# Patient Record
Sex: MALE | Race: WHITE | Employment: OTHER | URBAN - METROPOLITAN AREA
[De-identification: names, ages, dates, MRNs, and addresses within clinical notes are randomized per-mention and may not be internally consistent; named-entity substitution may affect disease eponyms.]

---

## 2017-01-04 PROCEDURE — 80053 COMPREHEN METABOLIC PANEL: CPT | Performed by: INTERNAL MEDICINE

## 2017-01-04 PROCEDURE — 36415 COLL VENOUS BLD VENIPUNCTURE: CPT | Performed by: INTERNAL MEDICINE

## 2017-01-04 PROCEDURE — 82570 ASSAY OF URINE CREATININE: CPT | Performed by: INTERNAL MEDICINE

## 2017-01-04 PROCEDURE — 84443 ASSAY THYROID STIM HORMONE: CPT | Performed by: INTERNAL MEDICINE

## 2017-01-04 PROCEDURE — 82043 UR ALBUMIN QUANTITATIVE: CPT | Performed by: INTERNAL MEDICINE

## 2017-01-04 PROCEDURE — 83721 ASSAY OF BLOOD LIPOPROTEIN: CPT | Performed by: INTERNAL MEDICINE

## 2017-01-04 PROCEDURE — 80061 LIPID PANEL: CPT | Performed by: INTERNAL MEDICINE

## 2017-03-15 ENCOUNTER — APPOINTMENT (OUTPATIENT)
Dept: CT IMAGING | Facility: HOSPITAL | Age: 57
DRG: 313 | End: 2017-03-15
Attending: EMERGENCY MEDICINE
Payer: COMMERCIAL

## 2017-03-15 ENCOUNTER — APPOINTMENT (OUTPATIENT)
Dept: GENERAL RADIOLOGY | Facility: HOSPITAL | Age: 57
DRG: 313 | End: 2017-03-15
Attending: EMERGENCY MEDICINE
Payer: COMMERCIAL

## 2017-03-15 ENCOUNTER — HOSPITAL ENCOUNTER (INPATIENT)
Facility: HOSPITAL | Age: 57
LOS: 2 days | Discharge: HOME | DRG: 313 | End: 2017-03-17
Attending: EMERGENCY MEDICINE | Admitting: INTERNAL MEDICINE
Payer: COMMERCIAL

## 2017-03-15 DIAGNOSIS — E11.65 TYPE 2 DIABETES, UNCONTROLLED, WITH NEUROPATHY (HCC): Primary | ICD-10-CM

## 2017-03-15 DIAGNOSIS — K62.5 RECTAL BLEEDING: ICD-10-CM

## 2017-03-15 DIAGNOSIS — E11.40 TYPE 2 DIABETES, UNCONTROLLED, WITH NEUROPATHY (HCC): Primary | ICD-10-CM

## 2017-03-15 DIAGNOSIS — N17.9 AKI (ACUTE KIDNEY INJURY) (HCC): ICD-10-CM

## 2017-03-15 DIAGNOSIS — M54.12 CERVICAL RADICULOPATHY: ICD-10-CM

## 2017-03-15 DIAGNOSIS — R20.2 PARESTHESIA: ICD-10-CM

## 2017-03-15 DIAGNOSIS — R07.9 ACUTE CHEST PAIN: Primary | ICD-10-CM

## 2017-03-15 PROBLEM — R73.9 HYPERGLYCEMIA: Status: ACTIVE | Noted: 2017-03-15

## 2017-03-15 PROBLEM — R79.89 AZOTEMIA: Status: ACTIVE | Noted: 2017-03-15

## 2017-03-15 LAB
ALBUMIN SERPL-MCNC: 3.2 G/DL (ref 3.5–4.8)
ALP LIVER SERPL-CCNC: 47 U/L (ref 45–117)
ALT SERPL-CCNC: 37 U/L (ref 17–63)
ANTIBODY SCREEN: NEGATIVE
APTT PPP: 28.1 SECONDS (ref 25–34)
AST SERPL-CCNC: 28 U/L (ref 15–41)
ATRIAL RATE: 74 BPM
BASOPHILS # BLD AUTO: 0.03 X10(3) UL (ref 0–0.1)
BASOPHILS NFR BLD AUTO: 0.4 %
BETA NATRIURETIC PEPTIDE: 12 PG/ML (ref 2–99)
BILIRUB SERPL-MCNC: 0.3 MG/DL (ref 0.1–2)
BILIRUB UR QL STRIP.AUTO: NEGATIVE
BUN BLD-MCNC: 52 MG/DL (ref 8–20)
CALCIUM BLD-MCNC: 9 MG/DL (ref 8.3–10.3)
CHLORIDE: 104 MMOL/L (ref 101–111)
CLARITY UR REFRACT.AUTO: CLEAR
CO2: 24 MMOL/L (ref 22–32)
CREAT BLD-MCNC: 2.24 MG/DL (ref 0.7–1.3)
D-DIMER: <0.27 UG/ML FEU (ref 0–0.49)
EOSINOPHIL # BLD AUTO: 0.23 X10(3) UL (ref 0–0.3)
EOSINOPHIL NFR BLD AUTO: 2.9 %
ERYTHROCYTE [DISTWIDTH] IN BLOOD BY AUTOMATED COUNT: 14.9 % (ref 11.5–16)
EST. AVERAGE GLUCOSE BLD GHB EST-MCNC: 192 MG/DL (ref 68–126)
GLUCOSE BLD-MCNC: 143 MG/DL (ref 65–99)
GLUCOSE BLD-MCNC: 149 MG/DL (ref 65–99)
GLUCOSE BLD-MCNC: 199 MG/DL (ref 70–99)
GLUCOSE UR STRIP.AUTO-MCNC: 50 MG/DL
HBA1C MFR BLD HPLC: 8.3 % (ref ?–5.7)
HCT VFR BLD AUTO: 37.7 % (ref 37–53)
HGB BLD-MCNC: 12.6 G/DL (ref 13–17)
IMMATURE GRANULOCYTE COUNT: 0.04 X10(3) UL (ref 0–1)
IMMATURE GRANULOCYTE RATIO %: 0.5 %
INR BLD: 0.95 (ref 0.89–1.11)
KETONES UR STRIP.AUTO-MCNC: NEGATIVE MG/DL
LEUKOCYTE ESTERASE UR QL STRIP.AUTO: NEGATIVE
LYMPHOCYTES # BLD AUTO: 3.22 X10(3) UL (ref 0.9–4)
LYMPHOCYTES NFR BLD AUTO: 40.6 %
M PROTEIN MFR SERPL ELPH: 7.3 G/DL (ref 6.1–8.3)
MCH RBC QN AUTO: 27.2 PG (ref 27–33.2)
MCHC RBC AUTO-ENTMCNC: 33.4 G/DL (ref 31–37)
MCV RBC AUTO: 81.3 FL (ref 80–99)
MONOCYTES # BLD AUTO: 0.58 X10(3) UL (ref 0.1–0.6)
MONOCYTES NFR BLD AUTO: 7.3 %
NEUTROPHIL ABS PRELIM: 3.84 X10 (3) UL (ref 1.3–6.7)
NEUTROPHILS # BLD AUTO: 3.84 X10(3) UL (ref 1.3–6.7)
NEUTROPHILS NFR BLD AUTO: 48.3 %
NITRITE UR QL STRIP.AUTO: NEGATIVE
P AXIS: 12 DEGREES
P-R INTERVAL: 186 MS
PH UR STRIP.AUTO: 5 [PH] (ref 4.5–8)
PLATELET # BLD AUTO: 217 10(3)UL (ref 150–450)
POTASSIUM SERPL-SCNC: 4.1 MMOL/L (ref 3.6–5.1)
PROT UR STRIP.AUTO-MCNC: NEGATIVE MG/DL
PSA SERPL DL<=0.01 NG/ML-MCNC: 12.7 SECONDS (ref 12–14.3)
Q-T INTERVAL: 400 MS
QRS DURATION: 108 MS
QTC CALCULATION (BEZET): 444 MS
R AXIS: 17 DEGREES
RBC # BLD AUTO: 4.64 X10(6)UL (ref 4.3–5.7)
RBC UR QL AUTO: NEGATIVE
RED CELL DISTRIBUTION WIDTH-SD: 44 FL (ref 35.1–46.3)
RH BLOOD TYPE: POSITIVE
SODIUM SERPL-SCNC: 139 MMOL/L (ref 136–144)
SP GR UR STRIP.AUTO: 1.01 (ref 1–1.03)
T AXIS: 58 DEGREES
TROPONIN: <0.046 NG/ML (ref ?–0.05)
TROPONIN: <0.046 NG/ML (ref ?–0.05)
UROBILINOGEN UR STRIP.AUTO-MCNC: <2 MG/DL
VENTRICULAR RATE: 74 BPM
WBC # BLD AUTO: 7.9 X10(3) UL (ref 4–13)

## 2017-03-15 PROCEDURE — 71010 XR CHEST AP PORTABLE  (CPT=71010): CPT

## 2017-03-15 PROCEDURE — 99223 1ST HOSP IP/OBS HIGH 75: CPT | Performed by: INTERNAL MEDICINE

## 2017-03-15 PROCEDURE — 74176 CT ABD & PELVIS W/O CONTRAST: CPT

## 2017-03-15 RX ORDER — FENOFIBRATE 134 MG/1
134 CAPSULE ORAL DAILY
Status: DISCONTINUED | OUTPATIENT
Start: 2017-03-16 | End: 2017-03-17

## 2017-03-15 RX ORDER — ONDANSETRON 2 MG/ML
4 INJECTION INTRAMUSCULAR; INTRAVENOUS EVERY 4 HOURS PRN
Status: DISCONTINUED | OUTPATIENT
Start: 2017-03-15 | End: 2017-03-17

## 2017-03-15 RX ORDER — KETOCONAZOLE 20 MG/G
CREAM TOPICAL
Status: DISCONTINUED | OUTPATIENT
Start: 2017-03-15 | End: 2017-03-16

## 2017-03-15 RX ORDER — DEXTROSE MONOHYDRATE 25 G/50ML
50 INJECTION, SOLUTION INTRAVENOUS
Status: DISCONTINUED | OUTPATIENT
Start: 2017-03-15 | End: 2017-03-17

## 2017-03-15 RX ORDER — ATORVASTATIN CALCIUM 80 MG/1
80 TABLET, FILM COATED ORAL NIGHTLY
Status: DISCONTINUED | OUTPATIENT
Start: 2017-03-15 | End: 2017-03-17

## 2017-03-15 RX ORDER — MELATONIN
325 EVERY OTHER DAY
Status: DISCONTINUED | OUTPATIENT
Start: 2017-03-16 | End: 2017-03-17

## 2017-03-15 RX ORDER — DOCUSATE SODIUM 100 MG/1
100 CAPSULE, LIQUID FILLED ORAL 2 TIMES DAILY
Status: DISCONTINUED | OUTPATIENT
Start: 2017-03-15 | End: 2017-03-17

## 2017-03-15 RX ORDER — SODIUM CHLORIDE 9 MG/ML
1000 INJECTION, SOLUTION INTRAVENOUS ONCE
Status: COMPLETED | OUTPATIENT
Start: 2017-03-15 | End: 2017-03-15

## 2017-03-15 RX ORDER — FLUTICASONE PROPIONATE 50 MCG
2 SPRAY, SUSPENSION (ML) NASAL DAILY
Status: DISCONTINUED | OUTPATIENT
Start: 2017-03-15 | End: 2017-03-17

## 2017-03-15 RX ORDER — SODIUM CHLORIDE 9 MG/ML
INJECTION, SOLUTION INTRAVENOUS CONTINUOUS
Status: ACTIVE | OUTPATIENT
Start: 2017-03-15 | End: 2017-03-15

## 2017-03-15 RX ORDER — GABAPENTIN 300 MG/1
300 CAPSULE ORAL 3 TIMES DAILY
Status: DISCONTINUED | OUTPATIENT
Start: 2017-03-15 | End: 2017-03-17

## 2017-03-15 RX ORDER — ASPIRIN 81 MG/1
324 TABLET, CHEWABLE ORAL ONCE
Status: COMPLETED | OUTPATIENT
Start: 2017-03-15 | End: 2017-03-15

## 2017-03-15 RX ORDER — AMITRIPTYLINE HYDROCHLORIDE 25 MG/1
25 TABLET, FILM COATED ORAL NIGHTLY
Status: DISCONTINUED | OUTPATIENT
Start: 2017-03-15 | End: 2017-03-17

## 2017-03-15 RX ORDER — METOPROLOL SUCCINATE 50 MG/1
50 TABLET, EXTENDED RELEASE ORAL DAILY
Status: DISCONTINUED | OUTPATIENT
Start: 2017-03-16 | End: 2017-03-17

## 2017-03-15 RX ORDER — DOXEPIN HYDROCHLORIDE 50 MG/1
1 CAPSULE ORAL DAILY
Status: DISCONTINUED | OUTPATIENT
Start: 2017-03-16 | End: 2017-03-17

## 2017-03-15 RX ORDER — ASPIRIN 81 MG/1
81 TABLET ORAL NIGHTLY
Status: DISCONTINUED | OUTPATIENT
Start: 2017-03-15 | End: 2017-03-17

## 2017-03-15 NOTE — PLAN OF CARE
NURSING ADMISSION NOTE      Patient admitted via Cart  Oriented to room. Safety precautions initiated. Bed in low position. Call light in reach.   Admission complete    Report given to RN

## 2017-03-15 NOTE — ED PROVIDER NOTES
Patient Seen in: [facility] Emergency Department    History   Patient presents with:  Chest Pain Angina (cardiovascular)    Stated Complaint: CP    HPI    Patient is a 59-year-old male with a history of four-vessel bypass done proximally 4 years ago localized, unspecified site      bilateral hips, bone spurs left hip   • Visual impairment    • ATRIAL FIBRILLATION    • Obesity, unspecified    • Personal history of other musculoskeletal disorders(V13.59) 1970's     fractured both elbows   • Personal his tablet daily for one week, then daily as needed   Amitriptyline HCl 25 MG Oral Tab,  TAKE 1 TABLET BY MOUTH NIGHTLY   METOPROLOL SUCCINATE ER 50 MG Oral Tablet 24 Hr,  TAKE 1 TABLET BY MOUTH EVERY DAY   insulin aspart (NOVOLOG FLEXPEN) 100 UNIT/ML Subcutan CAD   • Diabetes Sister      Gestational DM   • Diabetes Daughter      Type 2 DM on metformin   • Other[other] [OTHER] Daughter    • Hypertension Son    • Other[other] [OTHER] Son    • Obesity Son      Josefa Dubin over 300 lbs   • Thyroid Disorder Neg tenderness to palpation appreciated  throughout the abdomen. There is no guarding, no rebound, no mass, and no organomegaly appreciated. There is normoactive bowel sounds. There is no hernia.   RECTAL: There is brown stool which is Hemoccult negative with n Abnormality         Status                     ---------                               -----------         ------                     BLOOD TYPE, ABO AND RH D[[id number]]                         Final result               ANTIBODY SCREEN[1 Prescribed:  Current Discharge Medication List        Present on Admission  Date Reviewed: 12/30/2016          ICD-10-CM Noted POA    Acute chest pain R07.9 3/15/2017 Unknown    Acute kidney injury ([facility] [address] N17.9 3/15/2017 Yes    Azotemia R79.89 3/15/2017 Yes

## 2017-03-15 NOTE — ED INITIAL ASSESSMENT (HPI)
PT WITH CO CP STARTING LAST NIGHT. PT NOTED BLOOD IN HIS STOOL AROUND 2230 AND THEN WENT BACK TO BED. PT AWOKE IN A SWEAT.

## 2017-03-15 NOTE — H&P
DMg hospitalist H+P    PCP; Abundio Carrillo MD  CC pain      HPI 63 yo male with multiple medical problems including HTN, HL, DM2, RHETT, A-fib, depression came for evaluation of chest discomfort that occurred last night.  He was constipated, went to have BM a disease ([facility] [address] 3/9/2016     cerebral    • Depressed    • Arrhythmia    • Endocrine disorder    • Genitourinary disease    • Heart attack [facility])    • Depression    • Psychiatric disorder          Past Surgical History    LAPARO RADICAL NEPHRECTOMY Right 08/30 Name: N/A    Years of Education: N/A  Number of Children: 2     Occupational History    [employer]     Social History Main Topics   Smoking status: Former Smoker  0.50 Packs/Day  For 10.00 Years     Types: Cigarettes    Quit date: 01/01/1987    Smok Take 2 g by mouth 2 (two) times daily. Disp:  Rfl:    ferrous sulfate 325 (65 FE) MG Oral Tab EC Take 325 mg by mouth every other day. Disp:  Rfl:    Multiple Vitamin (TAB-A-CRISTO) Oral Tab Take 1 tablet by mouth daily.  Disp:  Rfl:    OMEGA-3-ACID ETHYL E depression    Chest pain; patient with hx of CAD, CABG  -cardiology consulted  Monitor on telemetry  Serial troponin  He has chest pain with deep breathing, check D-dimer, if elevated would need VQ scan  -cardiology consulted    DM2; insulin      DEEPA on CK

## 2017-03-15 NOTE — TELEPHONE ENCOUNTER
Medication: gabapentin    Date of last refill: 10/12/16  [place]    Last office visit: 10/12/16  Due back to clinic per last office note:  3 months  Date next office visit scheduled:  Future Appointments  Date Time Pr improvement in neuropathy symptoms with gabapentin at 300 mg bid but still persistent; dizziness explained to patient and VNG testing may suggest \"central\" etiology but no structural etiology based on imaging and would recommend continue vestibular thera

## 2017-03-15 NOTE — CONSULTS
[facility]  Report of Consultation    Arcelia Vaughn Patient Status:  Inpatient    1960 MRN [id number]   [facility] 0SW-A Attending Sheryle Bjork, MD   Hosp Day # 0 PCP Hung Enriquez MD     Reason for Consultation:  DEEPA/CKD    His D deficiency 1/7/2015     Dx in 1/2015   • Renal cancer ([facility] [address] 12/27/2012     Right nephrectomy in 2010    • S/p nephrectomy 12/27/2012     Right nephrectomy in 2010 for renal cancer    • Near syncope 9/20/2015   • Type 2 diabetes mellitus with vascular dis Diabetes Daughter      Type 2 DM on metformin   • Other[other] [OTHER] Daughter    • Hypertension Son    • Other[other] [OTHER] Son    • Obesity Son      Tiago Saavedra over 300 lbs   • Thyroid Disorder Neg       reports that he quit smoking about 30 years ago.  [email or web address]  •  [START ON 3/16/2017] Metoprolol Succinate ER (Toprol XL) 24 hr tab 50 mg, 50 mg, Oral, Daily  •  [START ON 3/16/2017] multivitamin (ADULT) tab 1 tablet, 1 tablet, Oral, Daily  Home Medications:    No current outpatient prescriptions on fi Negative 03/15/2017   UROBILINOGEN <2.0 03/15/2017   NITRITE Negative 03/15/2017   LEUUR Negative 03/15/2017         BUN (mg/dL)   Date Value   03/15/2017 52*   01/04/2017 42*   09/26/2016 33*   08/05/2014 37*   08/04/2014 40*   08/04/2014 42*   ----------

## 2017-03-15 NOTE — CONSULTS
[facility] Cardiology Consultation    Romina Benita Patient Status:  Inpatient    1960 MRN [id number]   [facility] 0SW-A Attending Yomi Mackay MD   Hosp Day # 0 PCP Donita Blizzard, MD     Reason for Consultation:  Chest pain, dyspnea      H • Obesity, unspecified    • Personal history of other musculoskeletal disorders(V13.59) 1970's     fractured both elbows   • Personal history of other musculoskeletal disorders(V13.59) 1969     rt foot   • Vitamin D deficiency 1/7/2015     Dx in 1/2015 Father    • Cancer Mother      Skin   • Diabetes Mother      Type 2 DM   • Diabetes Maternal Grandfather      Type 2 DM   • Heart Disorder Maternal Grandfather      CAD   • Diabetes Sister      Gestational DM   • Diabetes Daughter      Type 2 DM on metform 03/15/17   0656   WBC  7.9   HGB  12.6*   HCT  37.7   PLT  217.0       Chem:  Recent Labs   Lab  03/15/17   0656   NA  139   K  4.1   CL  104   CO2  24.0   BUN  52*   CREATSERUM  2.24*   ALT  37   AST  28   ALB  3.2*       Recent Labs   Lab  03/15/17   065

## 2017-03-16 ENCOUNTER — APPOINTMENT (OUTPATIENT)
Dept: CV DIAGNOSTICS | Facility: HOSPITAL | Age: 57
DRG: 313 | End: 2017-03-16
Attending: INTERNAL MEDICINE
Payer: COMMERCIAL

## 2017-03-16 LAB
BASOPHILS # BLD AUTO: 0.03 X10(3) UL (ref 0–0.1)
BASOPHILS NFR BLD AUTO: 0.4 %
BUN BLD-MCNC: 37 MG/DL (ref 8–20)
CALCIUM BLD-MCNC: 9.1 MG/DL (ref 8.3–10.3)
CHLORIDE: 101 MMOL/L (ref 101–111)
CO2: 32 MMOL/L (ref 22–32)
CREAT BLD-MCNC: 1.86 MG/DL (ref 0.7–1.3)
EOSINOPHIL # BLD AUTO: 0.16 X10(3) UL (ref 0–0.3)
EOSINOPHIL NFR BLD AUTO: 2 %
ERYTHROCYTE [DISTWIDTH] IN BLOOD BY AUTOMATED COUNT: 15.1 % (ref 11.5–16)
GLUCOSE BLD-MCNC: 161 MG/DL (ref 65–99)
GLUCOSE BLD-MCNC: 171 MG/DL (ref 65–99)
GLUCOSE BLD-MCNC: 171 MG/DL (ref 70–99)
GLUCOSE BLD-MCNC: 202 MG/DL (ref 65–99)
GLUCOSE BLD-MCNC: 208 MG/DL (ref 65–99)
HCT VFR BLD AUTO: 39.3 % (ref 37–53)
HGB BLD-MCNC: 12.8 G/DL (ref 13–17)
IMMATURE GRANULOCYTE COUNT: 0.04 X10(3) UL (ref 0–1)
IMMATURE GRANULOCYTE RATIO %: 0.5 %
LYMPHOCYTES # BLD AUTO: 2.81 X10(3) UL (ref 0.9–4)
LYMPHOCYTES NFR BLD AUTO: 35.3 %
MCH RBC QN AUTO: 26.8 PG (ref 27–33.2)
MCHC RBC AUTO-ENTMCNC: 32.6 G/DL (ref 31–37)
MCV RBC AUTO: 82.4 FL (ref 80–99)
MONOCYTES # BLD AUTO: 0.48 X10(3) UL (ref 0.1–0.6)
MONOCYTES NFR BLD AUTO: 6 %
NEUTROPHIL ABS PRELIM: 4.44 X10 (3) UL (ref 1.3–6.7)
NEUTROPHILS # BLD AUTO: 4.44 X10(3) UL (ref 1.3–6.7)
NEUTROPHILS NFR BLD AUTO: 55.8 %
PLATELET # BLD AUTO: 189 10(3)UL (ref 150–450)
POTASSIUM SERPL-SCNC: 4 MMOL/L (ref 3.6–5.1)
RBC # BLD AUTO: 4.77 X10(6)UL (ref 4.3–5.7)
RED CELL DISTRIBUTION WIDTH-SD: 45 FL (ref 35.1–46.3)
SODIUM SERPL-SCNC: 138 MMOL/L (ref 136–144)
WBC # BLD AUTO: 8 X10(3) UL (ref 4–13)

## 2017-03-16 PROCEDURE — 99232 SBSQ HOSP IP/OBS MODERATE 35: CPT | Performed by: INTERNAL MEDICINE

## 2017-03-16 PROCEDURE — 93306 TTE W/DOPPLER COMPLETE: CPT | Performed by: SPECIALIST

## 2017-03-16 PROCEDURE — 93306 TTE W/DOPPLER COMPLETE: CPT

## 2017-03-16 RX ORDER — HYDROCHLOROTHIAZIDE 25 MG/1
25 TABLET ORAL DAILY
Status: DISCONTINUED | OUTPATIENT
Start: 2017-03-16 | End: 2017-03-17

## 2017-03-16 RX ORDER — METOPROLOL SUCCINATE 50 MG/1
50 TABLET, EXTENDED RELEASE ORAL DAILY
COMMUNITY
End: 2017-07-12 | Stop reason: DRUGHIGH

## 2017-03-16 RX ORDER — GABAPENTIN 300 MG/1
300 CAPSULE ORAL 3 TIMES DAILY
Qty: 270 CAPSULE | Refills: 1 | Status: SHIPPED
Start: 2017-03-16 | End: 2017-04-04 | Stop reason: ALTCHOICE

## 2017-03-16 RX ORDER — ALFUZOSIN HYDROCHLORIDE 10 MG/1
10 TABLET, EXTENDED RELEASE ORAL
Status: DISCONTINUED | OUTPATIENT
Start: 2017-03-16 | End: 2017-03-17

## 2017-03-16 RX ORDER — ALFUZOSIN HYDROCHLORIDE 10 MG/1
10 TABLET, EXTENDED RELEASE ORAL
Status: DISCONTINUED | OUTPATIENT
Start: 2017-03-17 | End: 2017-03-16

## 2017-03-16 RX ORDER — AMITRIPTYLINE HYDROCHLORIDE 25 MG/1
25 TABLET, FILM COATED ORAL NIGHTLY
COMMUNITY
End: 2017-12-14

## 2017-03-16 RX ORDER — KETOCONAZOLE 20 MG/G
CREAM TOPICAL 2 TIMES DAILY PRN
Status: DISCONTINUED | OUTPATIENT
Start: 2017-03-16 | End: 2017-03-17

## 2017-03-16 RX ORDER — LOSARTAN POTASSIUM 50 MG/1
50 TABLET ORAL DAILY
Status: DISCONTINUED | OUTPATIENT
Start: 2017-03-16 | End: 2017-03-17

## 2017-03-16 NOTE — PROGRESS NOTES
[facility]  Progress Note    Roblero Flaming Patient Status:  Inpatient    1960 MRN [id number]   [facility] 0SW-A Attending Rick López MD   Hosp Day # 1 PCP Ruddy Larry MD     Noted to have urinary retention yesterday  S/p fo acute distress. Alert and oriented x 3. HEENT: Moist mucous membranes. EOM-I. PERRL  Neck: No lymphadenopathy. No JVD. No carotid bruits. Respiratory: Clear to auscultation bilaterally. No wheezes. No rhonchi.   Cardiovascular: S1, S2.  Regular rate and

## 2017-03-16 NOTE — RESPIRATORY THERAPY NOTE
RHETT : EQUIPMENT USE: DAILY SUMMARY                                            SET MODE: CPAP WITH CFLEX                                          USAGE IN HOURS: 7:19                                          90% EXP

## 2017-03-16 NOTE — PLAN OF CARE
CARDIOVASCULAR - ADULT    • Maintains optimal cardiac output and hemodynamic stability Progressing    • Absence of cardiac arrhythmias or at baseline Progressing        GENITOURINARY - ADULT    • Absence of urinary retention Progressing        Patient/Fami

## 2017-03-16 NOTE — PROGRESS NOTES
[facility] Cardiology Progress Note        Peña Welch Patient Status:  Inpatient    1960 MRN [id number]   [facility] 0SW-A Attending Petrona Mota MD   Hosp Day # 1 PCP Shelia Osborne MD     Subjective:   The p mmHg  General:  Appears comfortable  HEENT: No focal deficits. Neck: No JVD, carotids 2+ no bruits. Cardiac: Regular S1S2. No S3, S4, rub, click. No murmur. Lungs: Clear to auscultation and percussion. Abdomen: Soft, non-tender.    Extremities: trace artery graft to the LAD, saphenous vein graft to the diagonal, second obtuse marginal, and acute marginal branch of the right. on 10/2/13. Normal LVEF    3. Recent development of REIS, LE edema., r/o CHF  4. HTN, orthostasis noted in the past  5.  Dyslipidemi

## 2017-03-16 NOTE — PROGRESS NOTES
[facility] hospitalist daily note  Patient was seen/examined on 3/16/17    S; chest pain resolved, no nausea/emesis. Patient with urinary retention, cardoza placed.  He is able to have BM    Medications in EPIC    PE;   03/16/17  1136   BP: 133/69   Pulse: 84   T 3/17/17    Joshua Adair MD  [facility] Hospitalist  [phone]

## 2017-03-16 NOTE — CONSULTS
[facility] Urology   Consultation Note    Jordyn Randall Patient Status:  Inpatient    1960 MRN [id number]   [facility] 0SW-A Attending Mario Davidson MD   Hosp Day # 1 PCP Lily Condon MD     Reason for Consultation:  Herminio Preston history of other musculoskeletal disorders(V13.59) 1970's     fractured both elbows   • Personal history of other musculoskeletal disorders(V13.59) 1969     rt foot   • Vitamin D deficiency 1/7/2015     Dx in 1/2015   • Renal cancer ([facility] [address] 12/27/2012     Ri Diabetes Mother      Type 2 DM   • Diabetes Maternal Grandfather      Type 2 DM   • Heart Disorder Maternal Grandfather      CAD   • Diabetes Sister      Gestational DM   • Diabetes Daughter      Type 2 DM on metformin   • Other[other] [OTHER] Daughter Feliciano, Daily  •  gabapentin (NEURONTIN) cap 300 mg, 300 mg, Oral, TID  •  insulin detemir (LEVEMIR) 100 UNIT/ML flextouch 25 Units, 25 Units, Subcutaneous, [email or web address]  •  Metoprolol Succinate ER (Toprol XL) 24 hr tab 50 mg, 50 mg, Oral, Daily  •  multivi nephrectomy. Stable 1.8 cm exophytic cyst upper pole left kidney. No evidence of obstruction or nephrolithiasis.   ADRENALS:  Normal.  No mass or enlargement.     AORTA/VASCULAR:  Normal.  No aneurysm.     RETROPERITONEUM:  Normal.  No mass or adenopathy.  dysfunction with preserved systolic function     Depressed mood     Hypertension, essential, benign     Postural dizziness with presyncope     At risk for falling     Diplopia     Type 2 diabetes mellitus with stage 3 chronic kidney disease, with long-term

## 2017-03-16 NOTE — PAYOR COMM NOTE
Attending Physician: Cesar Preston MD    3/15    ED    CHEST PAIN  NOTICED SOME BLOOD ON STOOL      HX  4 VESSEL BYSPASS  RENAL DZ  KIDNEY CA  AFIB    Physical Exam        ED Triage Vitals    BP  03/15/17 0642  151/79 mmHg    Pulse  03/15/17 0642  75    R

## 2017-03-16 NOTE — PLAN OF CARE
Maintains optimal cardiac output and hemodynamic stability Progressing      Absence of urinary retention Progressing      AOx4 in NAD;   RA;  SR; no chest pain; no edema  Abdomen rounded; now with loose BM; had couple of episodes of hard stool  Alvarez drain

## 2017-03-17 VITALS
RESPIRATION RATE: 20 BRPM | OXYGEN SATURATION: 91 % | HEART RATE: 93 BPM | TEMPERATURE: 98 F | DIASTOLIC BLOOD PRESSURE: 60 MMHG | WEIGHT: 255 LBS | SYSTOLIC BLOOD PRESSURE: 142 MMHG | BODY MASS INDEX: 33.8 KG/M2 | HEIGHT: 73 IN

## 2017-03-17 LAB
BUN BLD-MCNC: 36 MG/DL (ref 8–20)
CALCIUM BLD-MCNC: 8.9 MG/DL (ref 8.3–10.3)
CHLORIDE: 101 MMOL/L (ref 101–111)
CO2: 27 MMOL/L (ref 22–32)
CREAT BLD-MCNC: 2.19 MG/DL (ref 0.7–1.3)
GLUCOSE BLD-MCNC: 144 MG/DL (ref 70–99)
GLUCOSE BLD-MCNC: 151 MG/DL (ref 65–99)
GLUCOSE BLD-MCNC: 195 MG/DL (ref 65–99)
POTASSIUM SERPL-SCNC: 3.8 MMOL/L (ref 3.6–5.1)
SODIUM SERPL-SCNC: 137 MMOL/L (ref 136–144)

## 2017-03-17 PROCEDURE — 99232 SBSQ HOSP IP/OBS MODERATE 35: CPT | Performed by: INTERNAL MEDICINE

## 2017-03-17 RX ORDER — ALFUZOSIN HYDROCHLORIDE 10 MG/1
10 TABLET, EXTENDED RELEASE ORAL
Qty: 90 TABLET | Refills: 0 | Status: SHIPPED | OUTPATIENT
Start: 2017-03-17 | End: 2017-06-12

## 2017-03-17 RX ORDER — PSEUDOEPHEDRINE HCL 30 MG
100 TABLET ORAL 2 TIMES DAILY
Qty: 60 CAPSULE | Refills: 0 | Status: SHIPPED | OUTPATIENT
Start: 2017-03-17 | End: 2018-10-02

## 2017-03-17 NOTE — PROGRESS NOTES
[facility]    Progress Note    Carmina Lyon Patient Status:  Inpatient    1960 MRN [id number]   [facility] 4NW-A Attending Mane Leggett MD   [facility] Day # 2 PCP Luis Solorio MD       Subjective:   Carmina Lyon is a(n) 64 year Group  3/17/2017

## 2017-03-17 NOTE — PROGRESS NOTES
[facility]  Progress Note    Wileen Blocker Patient Status:  Inpatient    1960 MRN [id number]   [facility] 0SW-A Attending Ritika Garcia MD   Hosp Day # 2 PCP Dean Elliott MD     No acute issues overnight      Current Facility-A temperature 97.9 °F (36.6 °C), temperature source Oral, resp. rate 20, height 73\", weight 255 lb, SpO2 91 %. General: No acute distress. Alert and oriented x 3. HEENT: Moist mucous membranes. EOM-I. PERRL  Neck: No lymphadenopathy. No JVD.  No carotid b

## 2017-03-17 NOTE — RESPIRATORY THERAPY NOTE
RHETT - Equipment Use Daily Summary:                  . Set Mode:  CPAP WITH C-FLEX                . Usage in hours: 6:53                . 90% Pressure (EPAP) level: 11                . 90% Insp. Pressure (IPAP): Fabio Avila AHI: 0.1                .  Sup

## 2017-03-17 NOTE — PROGRESS NOTES
Pt discharged to home Alert x4 voiding well post void residual: voided 450 bladder scan 0.  Scripts given to pt he and his wife verbalize a understanding

## 2017-03-17 NOTE — PLAN OF CARE
Pt assessed. VSS. Report provided to Jugo. Pt transported with belongings via wheelchair by transport on monitor to room 414.

## 2017-03-17 NOTE — PLAN OF CARE
CARDIOVASCULAR - ADULT    • Maintains optimal cardiac output and hemodynamic stability Progressing    • Absence of cardiac arrhythmias or at baseline Progressing        GENITOURINARY - ADULT    • Absence of urinary retention Progressing        Alert and or

## 2017-03-17 NOTE — DIETARY NOTE
Nutrition Short Note    Pt educated regarding diabetic diet basics. Meal plan provided. Encouraged to attend outpatient diabetes management center classes. Handout provided. Questions answered. Receptive. Expect compliance.     Betsy Dodge RD, , LDN  P

## 2017-03-20 NOTE — DISCHARGE SUMMARY
[facility]  Discharge Summary    Zondra Check Patient Status:  Inpatient    1960 MRN [id number]   [facility] 4NW-A Attending No att. providers found   Hosp Day # 2 PCP Gualberto Alvarado MD     Date of Admission: 3/15/2017    Date of pain under his left breast with deep breathing. sometimes has dysuria.  Currently no chest pain, pain quantity, quality, duration, radiation absent at this time     Hospital Course:     Chest pain; resolved  patient with hx of CAD, CABG  -appreciate cardiol daily with breakfast., Normal, Disp-90 tablet, R-0    bisacodyl 5 MG Oral Tab EC  Take 2 tablets (10 mg total) by mouth daily as needed for constipation. , Script printed, Disp-30 tablet, R-0    docusate sodium 100 MG Oral Cap  Take 100 mg by mouth 2 (two) R-1    hydrochlorothiazide 25 MG Oral Tab  Take one tablet daily for one week, then daily as needed, Normal, Disp-30 tablet, R-0    ergocalciferol 65278 UNITS Oral Cap  Take 1 capsule (50,000 Units total) by mouth twice a week., Normal, Disp-24 capsule, R- taking pain medications  Do not exceed 4 grams acetaminophen within 24 hours    Jannet Swift  3/20/2017  9:52 AM

## 2017-03-27 PROBLEM — K62.5 RECTAL BLEEDING: Status: RESOLVED | Noted: 2017-03-15 | Resolved: 2017-03-27

## 2017-04-04 ENCOUNTER — OFFICE VISIT (OUTPATIENT)
Dept: NEPHROLOGY | Facility: CLINIC | Age: 57
End: 2017-04-04

## 2017-04-04 VITALS — WEIGHT: 257 LBS | DIASTOLIC BLOOD PRESSURE: 72 MMHG | SYSTOLIC BLOOD PRESSURE: 144 MMHG | BODY MASS INDEX: 34 KG/M2

## 2017-04-04 DIAGNOSIS — R80.9 MICROALBUMINURIA: ICD-10-CM

## 2017-04-04 DIAGNOSIS — N18.3 CKD (CHRONIC KIDNEY DISEASE), STAGE 3 (MODERATE): Primary | ICD-10-CM

## 2017-04-04 DIAGNOSIS — I10 HYPERTENSION, BENIGN: ICD-10-CM

## 2017-04-04 PROCEDURE — 99214 OFFICE O/P EST MOD 30 MIN: CPT | Performed by: INTERNAL MEDICINE

## 2017-04-04 RX ORDER — FENOFIBRATE 130 MG/1
130 CAPSULE ORAL
COMMUNITY
End: 2017-04-05

## 2017-04-04 RX ORDER — ASPIRIN 325 MG
325 TABLET ORAL DAILY
COMMUNITY
End: 2017-04-19 | Stop reason: DRUGHIGH

## 2017-04-04 RX ORDER — PRAVASTATIN SODIUM 40 MG
40 TABLET ORAL NIGHTLY
COMMUNITY
End: 2017-04-19 | Stop reason: ALTCHOICE

## 2017-04-04 RX ORDER — LEVOCETIRIZINE DIHYDROCHLORIDE 5 MG/1
5 TABLET, FILM COATED ORAL EVERY EVENING
COMMUNITY
End: 2017-04-19

## 2017-04-04 RX ORDER — MECLIZINE HYDROCHLORIDE 25 MG/1
25 TABLET ORAL 3 TIMES DAILY PRN
COMMUNITY
End: 2019-05-29 | Stop reason: ALTCHOICE

## 2017-04-04 NOTE — PROGRESS NOTES
Nephrology Progress Note      Johann Casillas is a 64year old male.     HPI:   Patient presents with:  Chronic Kidney Disease  Hypertension      Mr. Svetlana Flanagan was seen in the nephrology clinic today in follow-up for management of chronic kidney disease ros hips, bone spurs left hip   • Visual impairment    • ATRIAL FIBRILLATION    • Obesity, unspecified    • Personal history of other musculoskeletal disorders 1970's     fractured both elbows   • Personal history of other musculoskeletal disorders 1969     rt Diabetes Father      Type 2 DM   • Other[other] [OTHER] Father    • Cancer Mother      Skin   • Diabetes Mother      Type 2 DM   • Diabetes Maternal Grandfather      Type 2 DM   • Heart Disorder Maternal Grandfather      CAD   • Diabetes Sister      Val Chatman (NOVOLOG FLEXPEN) 100 UNIT/ML Subcutaneous Solution Pen-injector Inject 25 Units into the skin 3 (three) times daily before meals.  Please dispense 5 boxes for 3 month supply Disp: 75 mL Rfl: 1   insulin detemir (LEVEMIR FLEXTOUCH) 100 UNIT/ML Subcutaneous wheezes, rales, rhonchi. Abdomen: Soft, non-tender. + bowel sounds, no palpable organomegaly  Extremities: Without clubbing, cyanosis or edema.   Neurologic: Alert and oriented, normal affect, cranial nerves grossly intact, moving all extremities  Skin: nephrosclerosis in the setting of a right nephrectomy.  his serum creatinine levels he has fluctuated somewhat over the past several years although has generally been between 1.6 and 1.8 mg/dL or so.   The most recent outpatient creatinine was 1.88 mg/dL in

## 2017-04-05 ENCOUNTER — HOSPITAL ENCOUNTER (OUTPATIENT)
Age: 57
Discharge: HOME | End: 2017-04-05
Attending: FAMILY MEDICINE
Payer: COMMERCIAL

## 2017-04-05 ENCOUNTER — APPOINTMENT (OUTPATIENT)
Dept: GENERAL RADIOLOGY | Age: 57
End: 2017-04-05
Attending: FAMILY MEDICINE
Payer: COMMERCIAL

## 2017-04-05 VITALS
HEIGHT: 73 IN | BODY MASS INDEX: 33.8 KG/M2 | DIASTOLIC BLOOD PRESSURE: 52 MMHG | RESPIRATION RATE: 20 BRPM | WEIGHT: 255 LBS | HEART RATE: 99 BPM | OXYGEN SATURATION: 98 % | SYSTOLIC BLOOD PRESSURE: 110 MMHG | TEMPERATURE: 98 F

## 2017-04-05 DIAGNOSIS — R79.89 ELEVATED SERUM CREATININE: ICD-10-CM

## 2017-04-05 DIAGNOSIS — J18.9 WALKING PNEUMONIA: Primary | ICD-10-CM

## 2017-04-05 DIAGNOSIS — B34.9 VIRAL SYNDROME: ICD-10-CM

## 2017-04-05 PROBLEM — E78.5 HYPERLIPIDEMIA, UNSPECIFIED: Status: ACTIVE | Noted: 2017-04-05

## 2017-04-05 PROCEDURE — 99214 OFFICE O/P EST MOD 30 MIN: CPT

## 2017-04-05 PROCEDURE — 94640 AIRWAY INHALATION TREATMENT: CPT

## 2017-04-05 PROCEDURE — 71020 XR CHEST PA + LAT CHEST (CPT=71020): CPT

## 2017-04-05 RX ORDER — AZITHROMYCIN 250 MG/1
TABLET, FILM COATED ORAL
Qty: 6 TABLET | Refills: 0 | Status: SHIPPED | OUTPATIENT
Start: 2017-04-05 | End: 2017-04-18 | Stop reason: ALTCHOICE

## 2017-04-05 RX ORDER — ALBUTEROL SULFATE 90 UG/1
2 AEROSOL, METERED RESPIRATORY (INHALATION) EVERY 6 HOURS PRN
Qty: 1 INHALER | Refills: 0 | Status: SHIPPED | OUTPATIENT
Start: 2017-04-05 | End: 2017-05-05

## 2017-04-05 RX ORDER — IPRATROPIUM BROMIDE AND ALBUTEROL SULFATE 2.5; .5 MG/3ML; MG/3ML
3 SOLUTION RESPIRATORY (INHALATION) ONCE
Status: COMPLETED | OUTPATIENT
Start: 2017-04-05 | End: 2017-04-05

## 2017-04-05 NOTE — ED PROVIDER NOTES
Patient Seen in: [facility]    History   Patient presents with:  Fever  Cough    Stated Complaint: CHEST [address]    HPI  70-year-old male with multiple medical problems including having generalized or localized, unspecified site      bilateral hips, bone spurs left hip   • Visual impairment    • ATRIAL FIBRILLATION    • Obesity, unspecified    • Personal history of other musculoskeletal disorders 1970's     fractured both elbows   • [place] UNIT/ML Subcutaneous Solution Pen-injector,  30-40 units three times daily with meals- needs 6 boxes/ 3 months   insulin detemir (LEVEMIR FLEXTOUCH) 100 UNIT/ML Subcutaneous Solution Pen-injector,  Inject 40 Units into the skin every 12 (twelve) hours.  Ple Econazole Nitrate 1 % External Cream,  Rub into feet well twice daily for 2 weeks   ferrous sulfate 325 (65 FE) MG Oral Tab EC,  Take 325 mg by mouth daily with breakfast.         Family History   Problem Relation Age of Onset   • Heart Surgery Father    • Exam    General appearance: alert, appears stated age and cooperative  Head: Normocephalic, without obvious abnormality, atraumatic  Eyes: conjunctivae/corneas clear.    Ears: normal TM's and external ear canals both ears  Nose: Nares normal. Septum midline Relevant Clinical Indication / Reason for Exam?  Answer: Raul oswald (specify)  Order Comments:   Pre and post peak flows  ipratropium-albuterol (DUONEB) nebulizer solution 3 mL   Sig:   Or MDM     Discussed follow up with PMD to have Creatinine checked in the next week. Notes that he just saw his Nephrologist 2 days ago and was told that he as doing ok.      Chest XR - with no he lobar pneumonia, however L lower lobe sub segment MD  [id number] [facility]  [phone]    In 2 days  For reassessment of symptoms       Medications Prescribed:  Discharge Medication List as of 4/5/2017 12:22 PM    START taking these medications    azithromycin 250 MG Oral Tab  Ta

## 2017-04-05 NOTE — ED INITIAL ASSESSMENT (HPI)
Cough- since Saturday, with phlegm, took coricidin hbp  It helps. Pt states he sleeps sitting up. Pt was discharged from [facility] about a week ago for chest pain and constipation,unable to urinate. Pt has only one kidney.  Pt was seen by Radha Zamora

## 2017-04-13 PROCEDURE — 87338 HPYLORI STOOL AG IA: CPT | Performed by: INTERNAL MEDICINE

## 2017-04-18 ENCOUNTER — HOSPITAL ENCOUNTER (OUTPATIENT)
Age: 57
Discharge: HOME | End: 2017-04-18
Attending: FAMILY MEDICINE
Payer: COMMERCIAL

## 2017-04-18 ENCOUNTER — APPOINTMENT (OUTPATIENT)
Dept: GENERAL RADIOLOGY | Age: 57
End: 2017-04-18
Attending: FAMILY MEDICINE
Payer: COMMERCIAL

## 2017-04-18 VITALS
RESPIRATION RATE: 20 BRPM | SYSTOLIC BLOOD PRESSURE: 153 MMHG | HEART RATE: 85 BPM | DIASTOLIC BLOOD PRESSURE: 75 MMHG | TEMPERATURE: 98 F | OXYGEN SATURATION: 95 %

## 2017-04-18 DIAGNOSIS — R09.89 CHOKING EPISODE: ICD-10-CM

## 2017-04-18 DIAGNOSIS — J45.909 REACTIVE AIRWAY DISEASE, UNSPECIFIED ASTHMA SEVERITY, UNCOMPLICATED: ICD-10-CM

## 2017-04-18 DIAGNOSIS — E78.5 HYPERLIPIDEMIA, UNSPECIFIED: ICD-10-CM

## 2017-04-18 DIAGNOSIS — J20.9 BRONCHITIS WITH BRONCHOSPASM: Primary | ICD-10-CM

## 2017-04-18 PROCEDURE — 85025 COMPLETE CBC W/AUTO DIFF WBC: CPT | Performed by: FAMILY MEDICINE

## 2017-04-18 PROCEDURE — 84484 ASSAY OF TROPONIN QUANT: CPT

## 2017-04-18 PROCEDURE — 71020 XR CHEST PA + LAT CHEST (CPT=71020): CPT

## 2017-04-18 PROCEDURE — 36415 COLL VENOUS BLD VENIPUNCTURE: CPT

## 2017-04-18 PROCEDURE — 94640 AIRWAY INHALATION TREATMENT: CPT

## 2017-04-18 PROCEDURE — 93010 ELECTROCARDIOGRAM REPORT: CPT

## 2017-04-18 PROCEDURE — 93005 ELECTROCARDIOGRAM TRACING: CPT

## 2017-04-18 PROCEDURE — 80047 BASIC METABLC PNL IONIZED CA: CPT

## 2017-04-18 PROCEDURE — 99215 OFFICE O/P EST HI 40 MIN: CPT

## 2017-04-18 RX ORDER — IPRATROPIUM BROMIDE AND ALBUTEROL SULFATE 2.5; .5 MG/3ML; MG/3ML
3 SOLUTION RESPIRATORY (INHALATION) ONCE
Status: COMPLETED | OUTPATIENT
Start: 2017-04-18 | End: 2017-04-18

## 2017-04-18 RX ORDER — PREDNISONE 10 MG/1
TABLET ORAL
Qty: 25 TABLET | Refills: 0 | Status: SHIPPED | OUTPATIENT
Start: 2017-04-19 | End: 2017-04-27

## 2017-04-19 ENCOUNTER — OFFICE VISIT (OUTPATIENT)
Dept: NEUROLOGY | Facility: CLINIC | Age: 57
End: 2017-04-19

## 2017-04-19 VITALS
SYSTOLIC BLOOD PRESSURE: 128 MMHG | BODY MASS INDEX: 34 KG/M2 | WEIGHT: 255 LBS | RESPIRATION RATE: 20 BRPM | HEART RATE: 92 BPM | DIASTOLIC BLOOD PRESSURE: 62 MMHG

## 2017-04-19 DIAGNOSIS — E11.65 TYPE 2 DIABETES, UNCONTROLLED, WITH NEUROPATHY (HCC): Primary | ICD-10-CM

## 2017-04-19 DIAGNOSIS — E11.40 TYPE 2 DIABETES, UNCONTROLLED, WITH NEUROPATHY (HCC): Primary | ICD-10-CM

## 2017-04-19 DIAGNOSIS — R20.2 PARESTHESIAS: ICD-10-CM

## 2017-04-19 PROCEDURE — 99213 OFFICE O/P EST LOW 20 MIN: CPT | Performed by: OTHER

## 2017-04-19 RX ORDER — ATORVASTATIN CALCIUM 80 MG/1
1 TABLET, FILM COATED ORAL NIGHTLY
COMMUNITY
Start: 2017-04-05 | End: 2017-07-03

## 2017-04-19 RX ORDER — GABAPENTIN 300 MG/1
600 CAPSULE ORAL 3 TIMES DAILY
Qty: 540 CAPSULE | Refills: 1 | Status: SHIPPED | OUTPATIENT
Start: 2017-04-19 | End: 2017-10-10

## 2017-04-19 NOTE — PATIENT INSTRUCTIONS
Increase dose of gabapentin up to 600 mg tid - start with 600 mg AM / 300 mg PM / 300 mg nightly x1 week, then increase to 600 mg / 600 mg / 300 mg x1 week, then 600 mg three times daily   Follow up in 3 months    After your visit at the [facility] physician has ordered radiology tests such as MRI or CT scans, do not schedule the test until this office has notified you that the test has been approved by your insurer. Depending on your insurance carrier, approval may take 3-10 days.  It is highly cydney

## 2017-04-19 NOTE — ED INITIAL ASSESSMENT (HPI)
C/O got choke with phlegm at work around 6pm, feeling short of breath. Cough for 3 weeks. Denies chest pain,. Denies back pain. Was seen here 3 weeks ago for cough dx with Pneumonia,  was given antibiotic completed antibiotic.

## 2017-04-19 NOTE — PROGRESS NOTES
[facility] Progress Note    HPI    Follow-up: Neuropathy, dizziness    As per my initial note, 12/31/15:  Carmela Sosa is a 64year old man, with past medical history significant for multiple medical problems, including hypertension, his feet and admits he gets tired when walking; he does admit to episode about a month ago where he was not able to urinate or defecate - was told he had \"twist\" in his colon after recent colonoscopy       Past Medical History   Diagnosis Date   • HYPERT SURGICAL; NEPHRECTOMY      HERNIA SURGERY  in [place]    HERNIA SURGERY  6-25-12 [facility]    Comment Lap Repair Ventral Hernia w/mesh & Lysis of Adhesions    COLONOSCOPY  9/26/12    Comment Tortuous colon, incomplete. BE 8 mm polyp. Status: Former Smoker                   Packs/Day: 0.50  Years: 10        Types: Cigarettes      Quit date: 01/01/1987    Smokeless Status: Never Used                        Comment: started 1976    Alcohol Use: No              Drug Use: No            Othe tablets (10 mg total) by mouth daily as needed for constipation. , Disp: 30 tablet, Rfl: 0  •  docusate sodium 100 MG Oral Cap, Take 100 mg by mouth 2 (two) times daily. , Disp: 60 capsule, Rfl: 0  •  Amitriptyline HCl 25 MG Oral Tab, Take 25 mg by mouth nig on left (chronic and stable); sensation intact symmetrically; tongue and palate midline, SCM, hearing intact    Motor: 5/5 strength throughout,tone normal; no drift   DTR: 1+ symmetric throughout UE; 2+ patellar bilaterally, absent ankle jerks; toes downgo 0-12 mm/Hr 18 (H)   RHEUMATOID FACTOR      <15 IU/mL <10   SSA AUTOAB      <100 AU/mL <711   FOLATE (FOLIC ACID), SERUM      8.7-24.0 ng/mL 26.4 (H)   TSH      0.350-5.500 mIU/mL 0.935     11/23/15:  Hemoglobin A1C: 10.4  Lipids: Tri, Total Chol 213; stimulation. EMG:   Concentric needle EMG was performed on the selected muscles   listed below in the following table with the following findings:   1.  Increased insertional activity consisting of positive sharp   waves and fibrillation potentials was n some atypical features on EMG with conduction block at non-entrapment sites but does not meet criteria for primary demyelinating neuropathy     Ganglioside Ab panel done and unremarkable, folate, B12, RPR, sed rate, CRP, NOEMI, TSH and monoclonal protein shay

## 2017-04-19 NOTE — ED PROVIDER NOTES
Patient Seen in: [facility] [place]    History   Patient presents with:  Cough/URI  Shortness Of Breath    Stated Complaint: shortness of breathe & cough    HPI  68-year-old male coming in with complaints of having had a \"choking episo other musculoskeletal disorders 1969     rt foot   • Vitamin D deficiency 1/7/2015     Dx in 1/2015   • Renal cancer ([facility] [address] 12/27/2012     Right nephrectomy in 2010    • S/p nephrectomy 12/27/2012     Right nephrectomy in 2010 for renal cancer    • Near syn Oral Cap,  Take 300 mg by mouth 3 (three) times daily.    insulin aspart (NOVOLOG FLEXPEN) 100 UNIT/ML Subcutaneous Solution Pen-injector,  30-40 units three times daily with meals- needs 6 boxes/ 3 months   insulin detemir (LEVEMIR FLEXTOUCH) 100 UNIT/ML S Problem Relation Age of Onset   • Heart Surgery Father    • Heart Disease Father    • Cancer Father      Esophageal cancer   • Heart Disorder Father      CABG at age 46s   • Diabetes Father      Type 2 DM   • Other[other] [OTHER] Father    • Cancer Mothe RRW  CV: RRR  ABD: not distended  NEURO: Alert and oriented to person place and time  GAIT: Normal        ED Course     Labs Reviewed   POCT CBC - Abnormal; Notable for the following:     MCH IC 26.7 (*)     PLT .0 (*)     All other components within lateral chest radiographs were obtained. PATIENT STATED HISTORY: (As transcribed by Technologist)  Patient was diagnosed with lower left atelectasis on 04/05/17. Patient still has a cough with shortness of breath.     FINDINGS:  Normal heart size and pulmo develop any worsening symptoms please go to the ER immediately.        Disposition and Plan     Clinical Impression:  Bronchitis with bronchospasm  (primary encounter diagnosis)  Choking episode  Reactive airway disease, unspecified asthma severity, uncompl

## 2017-04-25 ENCOUNTER — APPOINTMENT (OUTPATIENT)
Dept: GENERAL RADIOLOGY | Facility: HOSPITAL | Age: 57
End: 2017-04-25
Attending: EMERGENCY MEDICINE
Payer: COMMERCIAL

## 2017-04-25 ENCOUNTER — APPOINTMENT (OUTPATIENT)
Dept: NUCLEAR MEDICINE | Facility: HOSPITAL | Age: 57
End: 2017-04-25
Attending: EMERGENCY MEDICINE
Payer: COMMERCIAL

## 2017-04-25 ENCOUNTER — HOSPITAL ENCOUNTER (EMERGENCY)
Facility: HOSPITAL | Age: 57
Discharge: HOME | End: 2017-04-25
Attending: EMERGENCY MEDICINE
Payer: COMMERCIAL

## 2017-04-25 VITALS
HEART RATE: 87 BPM | HEIGHT: 73 IN | RESPIRATION RATE: 16 BRPM | OXYGEN SATURATION: 94 % | TEMPERATURE: 99 F | SYSTOLIC BLOOD PRESSURE: 128 MMHG | BODY MASS INDEX: 33.8 KG/M2 | DIASTOLIC BLOOD PRESSURE: 71 MMHG | WEIGHT: 255 LBS

## 2017-04-25 DIAGNOSIS — J98.01 ACUTE BRONCHOSPASM: ICD-10-CM

## 2017-04-25 DIAGNOSIS — J06.9 VIRAL UPPER RESPIRATORY TRACT INFECTION: Primary | ICD-10-CM

## 2017-04-25 PROCEDURE — 80053 COMPREHEN METABOLIC PANEL: CPT | Performed by: EMERGENCY MEDICINE

## 2017-04-25 PROCEDURE — 84484 ASSAY OF TROPONIN QUANT: CPT | Performed by: EMERGENCY MEDICINE

## 2017-04-25 PROCEDURE — 96374 THER/PROPH/DIAG INJ IV PUSH: CPT

## 2017-04-25 PROCEDURE — 93005 ELECTROCARDIOGRAM TRACING: CPT

## 2017-04-25 PROCEDURE — 99285 EMERGENCY DEPT VISIT HI MDM: CPT

## 2017-04-25 PROCEDURE — 85378 FIBRIN DEGRADE SEMIQUANT: CPT | Performed by: EMERGENCY MEDICINE

## 2017-04-25 PROCEDURE — 94640 AIRWAY INHALATION TREATMENT: CPT

## 2017-04-25 PROCEDURE — 96361 HYDRATE IV INFUSION ADD-ON: CPT

## 2017-04-25 PROCEDURE — 71020 XR CHEST PA + LAT CHEST (CPT=71020): CPT

## 2017-04-25 PROCEDURE — 94667 MNPJ CHEST WALL 1ST: CPT

## 2017-04-25 PROCEDURE — 93010 ELECTROCARDIOGRAM REPORT: CPT

## 2017-04-25 PROCEDURE — 85025 COMPLETE CBC W/AUTO DIFF WBC: CPT | Performed by: EMERGENCY MEDICINE

## 2017-04-25 PROCEDURE — 78582 LUNG VENTILAT&PERFUS IMAGING: CPT

## 2017-04-25 RX ORDER — PREDNISONE 20 MG/1
40 TABLET ORAL DAILY
Qty: 14 TABLET | Refills: 0 | Status: SHIPPED | OUTPATIENT
Start: 2017-04-25 | End: 2017-05-02

## 2017-04-25 RX ORDER — METHYLPREDNISOLONE SODIUM SUCCINATE 125 MG/2ML
60 INJECTION, POWDER, LYOPHILIZED, FOR SOLUTION INTRAMUSCULAR; INTRAVENOUS ONCE
Status: COMPLETED | OUTPATIENT
Start: 2017-04-25 | End: 2017-04-25

## 2017-04-25 RX ORDER — SODIUM CHLORIDE 9 MG/ML
INJECTION, SOLUTION INTRAVENOUS CONTINUOUS
Status: DISCONTINUED | OUTPATIENT
Start: 2017-04-25 | End: 2017-04-25

## 2017-04-25 RX ORDER — IPRATROPIUM BROMIDE AND ALBUTEROL SULFATE 2.5; .5 MG/3ML; MG/3ML
3 SOLUTION RESPIRATORY (INHALATION) ONCE
Status: COMPLETED | OUTPATIENT
Start: 2017-04-25 | End: 2017-04-25

## 2017-04-25 NOTE — ED INITIAL ASSESSMENT (HPI)
Pt felt short of breath and lightheaded on way to work. Went to his pcp office. o2 sat at office was 90%. Pt recently had pneumonia.

## 2017-04-25 NOTE — ED PROVIDER NOTES
VQ scan: Low probability for PE  PERFUSION:    Minimally heterogenous distribution of the pharmaceutical  REGIONS OF MISMATCH:  No significant areas of mismatch.     Therefore patient will be discharged per Dr. Nabila Rivera

## 2017-04-25 NOTE — ED PROVIDER NOTES
Patient Seen in: [facility] Emergency Department    History   Patient presents with:  Dyspnea ANTONIO SOB (respiratory)    Stated Complaint: antonio    HPI    45-year-old white male who presents to the emergency room today for complaint of difficulty breath disorders 1970's     fractured both elbows   • Personal history of other musculoskeletal disorders 1969     rt foot   • Vitamin D deficiency 1/7/2015     Dx in 1/2015   • Renal cancer ([facility] [address] 12/27/2012     Right nephrectomy in 2010    • S/p nephrectomy 12/2 mouth 2 (two) times daily. Atorvastatin Calcium 80 MG Oral Tab,  Take 1 tablet by mouth nightly.   gabapentin 300 MG Oral Cap,  Take 2 capsules (600 mg total) by mouth 3 (three) times daily.    predniSONE 10 MG Oral Tab,  Day 1: 50mg/5pills, Day 2,3: 40 m 2 (two) times daily. Econazole Nitrate 1 % External Cream,  Rub into feet well twice daily for 2 weeks   Multiple Vitamin (TAB-A-CRISTO) Oral Tab,  Take 1 tablet by mouth daily.        Family History   Problem Relation Age of Onset   • Heart Surgery Father signs are stable he is afebrile    Head is normocephalic atraumatic conjunctiva is clear. Sclerae anicteric. Neck is supple. Lungs have wheezing to auscultation bilaterally.   Heart is regular rate and rhythm without murmur gallop or rub    Abdomen is EKG    Rate, intervals and axes as noted on EKG Report. Rate: 92 bpm  Rhythm: Sinus Rhythm  Reading: Normal sinus rhythm without acute ischemic changes noted. Agree with computer report for rate axes and intervals.           Chest x-ray reveals:  FINDINGS Potential duplicate medications found. Please discuss with provider.

## 2017-05-17 PROCEDURE — 80048 BASIC METABOLIC PNL TOTAL CA: CPT | Performed by: INTERNAL MEDICINE

## 2017-07-21 ENCOUNTER — HOSPITAL ENCOUNTER (OUTPATIENT)
Dept: INTERVENTIONAL RADIOLOGY/VASCULAR | Facility: HOSPITAL | Age: 57
Setting detail: OBSERVATION
Discharge: HOME | End: 2017-07-22
Attending: INTERNAL MEDICINE | Admitting: INTERNAL MEDICINE
Payer: COMMERCIAL

## 2017-07-21 DIAGNOSIS — Z95.1 S/P CABG X 4: ICD-10-CM

## 2017-07-21 DIAGNOSIS — Z90.5 S/P NEPHRECTOMY: Primary | ICD-10-CM

## 2017-07-21 DIAGNOSIS — I25.10 CORONARY ARTERIOSCLEROSIS: ICD-10-CM

## 2017-07-21 LAB
GLUCOSE BLD-MCNC: 109 MG/DL (ref 65–99)
GLUCOSE BLD-MCNC: 185 MG/DL (ref 65–99)
GLUCOSE BLD-MCNC: 193 MG/DL (ref 65–99)

## 2017-07-21 PROCEDURE — 96360 HYDRATION IV INFUSION INIT: CPT

## 2017-07-21 PROCEDURE — 93010 ELECTROCARDIOGRAM REPORT: CPT | Performed by: INTERNAL MEDICINE

## 2017-07-21 PROCEDURE — 94660 CPAP INITIATION&MGMT: CPT

## 2017-07-21 PROCEDURE — B218YZZ FLUOROSCOPY OF LEFT INTERNAL MAMMARY BYPASS GRAFT USING OTHER CONTRAST: ICD-10-PCS | Performed by: INTERNAL MEDICINE

## 2017-07-21 PROCEDURE — 99152 MOD SED SAME PHYS/QHP 5/>YRS: CPT

## 2017-07-21 PROCEDURE — 027034Z DILATION OF CORONARY ARTERY, ONE ARTERY WITH DRUG-ELUTING INTRALUMINAL DEVICE, PERCUTANEOUS APPROACH: ICD-10-PCS | Performed by: INTERNAL MEDICINE

## 2017-07-21 PROCEDURE — B211YZZ FLUOROSCOPY OF MULTIPLE CORONARY ARTERIES USING OTHER CONTRAST: ICD-10-PCS | Performed by: INTERNAL MEDICINE

## 2017-07-21 PROCEDURE — 96361 HYDRATE IV INFUSION ADD-ON: CPT

## 2017-07-21 PROCEDURE — 99153 MOD SED SAME PHYS/QHP EA: CPT

## 2017-07-21 PROCEDURE — 93005 ELECTROCARDIOGRAM TRACING: CPT

## 2017-07-21 PROCEDURE — 82962 GLUCOSE BLOOD TEST: CPT

## 2017-07-21 PROCEDURE — 93455 CORONARY ART/GRFT ANGIO S&I: CPT

## 2017-07-21 PROCEDURE — B213YZZ FLUOROSCOPY OF MULTIPLE CORONARY ARTERY BYPASS GRAFTS USING OTHER CONTRAST: ICD-10-PCS | Performed by: INTERNAL MEDICINE

## 2017-07-21 RX ORDER — DOCUSATE SODIUM 100 MG/1
100 CAPSULE, LIQUID FILLED ORAL 2 TIMES DAILY
Status: DISCONTINUED | OUTPATIENT
Start: 2017-07-21 | End: 2017-07-22

## 2017-07-21 RX ORDER — METOPROLOL SUCCINATE 100 MG/1
100 TABLET, EXTENDED RELEASE ORAL
Status: DISCONTINUED | OUTPATIENT
Start: 2017-07-21 | End: 2017-07-21

## 2017-07-21 RX ORDER — CLOPIDOGREL BISULFATE 75 MG/1
TABLET ORAL
Status: COMPLETED
Start: 2017-07-21 | End: 2017-07-21

## 2017-07-21 RX ORDER — LOSARTAN POTASSIUM 50 MG/1
50 TABLET ORAL DAILY
Status: DISCONTINUED | OUTPATIENT
Start: 2017-07-21 | End: 2017-07-21

## 2017-07-21 RX ORDER — GABAPENTIN 300 MG/1
600 CAPSULE ORAL 3 TIMES DAILY
Status: DISCONTINUED | OUTPATIENT
Start: 2017-07-21 | End: 2017-07-22

## 2017-07-21 RX ORDER — AMITRIPTYLINE HYDROCHLORIDE 25 MG/1
25 TABLET, FILM COATED ORAL NIGHTLY
Status: DISCONTINUED | OUTPATIENT
Start: 2017-07-21 | End: 2017-07-22

## 2017-07-21 RX ORDER — ASPIRIN 325 MG
325 TABLET, DELAYED RELEASE (ENTERIC COATED) ORAL DAILY
Status: DISCONTINUED | OUTPATIENT
Start: 2017-07-22 | End: 2017-07-22

## 2017-07-21 RX ORDER — LOSARTAN POTASSIUM 50 MG/1
50 TABLET ORAL DAILY
Status: DISCONTINUED | OUTPATIENT
Start: 2017-07-22 | End: 2017-07-22

## 2017-07-21 RX ORDER — SODIUM CHLORIDE 9 MG/ML
INJECTION, SOLUTION INTRAVENOUS ONCE
Status: COMPLETED | OUTPATIENT
Start: 2017-07-21 | End: 2017-07-21

## 2017-07-21 RX ORDER — MONTELUKAST SODIUM 10 MG/1
10 TABLET ORAL NIGHTLY
Status: DISCONTINUED | OUTPATIENT
Start: 2017-07-21 | End: 2017-07-22

## 2017-07-21 RX ORDER — IPRATROPIUM BROMIDE AND ALBUTEROL SULFATE 2.5; .5 MG/3ML; MG/3ML
3 SOLUTION RESPIRATORY (INHALATION) EVERY 6 HOURS PRN
Status: DISCONTINUED | OUTPATIENT
Start: 2017-07-21 | End: 2017-07-22

## 2017-07-21 RX ORDER — SODIUM CHLORIDE 9 MG/ML
INJECTION, SOLUTION INTRAVENOUS CONTINUOUS
Status: ACTIVE | OUTPATIENT
Start: 2017-07-21 | End: 2017-07-21

## 2017-07-21 RX ORDER — CLOPIDOGREL BISULFATE 75 MG/1
75 TABLET ORAL DAILY
Status: DISCONTINUED | OUTPATIENT
Start: 2017-07-22 | End: 2017-07-21

## 2017-07-21 RX ORDER — CLOPIDOGREL BISULFATE 75 MG/1
75 TABLET ORAL DAILY
Qty: 30 TABLET | Refills: 11 | Status: SHIPPED | OUTPATIENT
Start: 2017-07-22 | End: 2017-07-22

## 2017-07-21 RX ORDER — HEPARIN SODIUM 5000 [USP'U]/ML
INJECTION, SOLUTION INTRAVENOUS; SUBCUTANEOUS
Status: COMPLETED
Start: 2017-07-21 | End: 2017-07-21

## 2017-07-21 RX ORDER — ASPIRIN 81 MG/1
81 TABLET, CHEWABLE ORAL DAILY
Status: DISCONTINUED | OUTPATIENT
Start: 2017-07-22 | End: 2017-07-21

## 2017-07-21 RX ORDER — SODIUM CHLORIDE 9 MG/ML
INJECTION, SOLUTION INTRAVENOUS CONTINUOUS
Status: DISCONTINUED | OUTPATIENT
Start: 2017-07-21 | End: 2017-07-21

## 2017-07-21 RX ORDER — ALBUTEROL SULFATE 90 UG/1
2 AEROSOL, METERED RESPIRATORY (INHALATION) EVERY 4 HOURS PRN
Status: DISCONTINUED | OUTPATIENT
Start: 2017-07-21 | End: 2017-07-22

## 2017-07-21 RX ORDER — MECLIZINE HCL 12.5 MG/1
25 TABLET ORAL 3 TIMES DAILY PRN
Status: DISCONTINUED | OUTPATIENT
Start: 2017-07-21 | End: 2017-07-22

## 2017-07-21 RX ORDER — METOPROLOL SUCCINATE 100 MG/1
100 TABLET, EXTENDED RELEASE ORAL DAILY
Status: DISCONTINUED | OUTPATIENT
Start: 2017-07-22 | End: 2017-07-22

## 2017-07-21 RX ORDER — MIDAZOLAM HYDROCHLORIDE 1 MG/ML
INJECTION INTRAMUSCULAR; INTRAVENOUS
Status: COMPLETED
Start: 2017-07-21 | End: 2017-07-21

## 2017-07-21 RX ORDER — ALFUZOSIN HYDROCHLORIDE 10 MG/1
10 TABLET, EXTENDED RELEASE ORAL
Status: DISCONTINUED | OUTPATIENT
Start: 2017-07-22 | End: 2017-07-22

## 2017-07-21 RX ORDER — ATORVASTATIN CALCIUM 10 MG/1
10 TABLET, FILM COATED ORAL NIGHTLY
Status: DISCONTINUED | OUTPATIENT
Start: 2017-07-21 | End: 2017-07-22

## 2017-07-21 RX ORDER — CLOPIDOGREL BISULFATE 75 MG/1
75 TABLET ORAL DAILY
Status: DISCONTINUED | OUTPATIENT
Start: 2017-07-22 | End: 2017-07-22

## 2017-07-21 RX ORDER — LIDOCAINE HYDROCHLORIDE 10 MG/ML
INJECTION, SOLUTION INFILTRATION; PERINEURAL
Status: COMPLETED
Start: 2017-07-21 | End: 2017-07-21

## 2017-07-21 RX ADMIN — ATORVASTATIN CALCIUM 10 MG: 10 TABLET, FILM COATED ORAL at 20:17:00

## 2017-07-21 RX ADMIN — GABAPENTIN 600 MG: 300 CAPSULE ORAL at 20:17:00

## 2017-07-21 RX ADMIN — SODIUM CHLORIDE: 9 INJECTION, SOLUTION INTRAVENOUS at 13:46:00

## 2017-07-21 RX ADMIN — SODIUM CHLORIDE: 9 INJECTION, SOLUTION INTRAVENOUS at 10:15:00

## 2017-07-21 RX ADMIN — MONTELUKAST SODIUM 10 MG: 10 TABLET ORAL at 20:17:00

## 2017-07-21 RX ADMIN — AMITRIPTYLINE HYDROCHLORIDE 25 MG: 25 TABLET, FILM COATED ORAL at 20:17:00

## 2017-07-21 RX ADMIN — SODIUM CHLORIDE: 9 INJECTION, SOLUTION INTRAVENOUS at 13:11:00

## 2017-07-21 NOTE — PAYOR COMM NOTE
--------------  ADMISSION REVIEW     Payor: Shlomo Deluna  #:  [id number]  Authorization Number: N/A    Admit date: N/A  Admit time: N/A       Admitting Physician: Nelia Beebe MD  Attending Physician:  Keturah Lopez

## 2017-07-21 NOTE — H&P
Geoff Leandra reports left chest discomfort--like his previous angina--with heavier activity at work. He notes some tingling in LUE, left jaw.        Pneumonia, dyspnea 4/2017.   A 10-point review of systems was performed and was negative except for the positives d the lungs every 4 (four) hours as needed for Wheezing or Shortness of Breath. Disp: 1 Inhaler Rfl: 3   Loratadine-Pseudoephedrine ER (CLARITIN-D 12 HOUR) 5-120 MG Oral Tablet 12 Hr Take 1 tablet by mouth 2 (two) times daily.  Disp: 60 tablet Rfl: 0   aspiri Rfl: 1   Multiple Vitamin (TAB-A-CRISTO) Oral Tab Take 1 tablet by mouth daily. Disp:  Rfl:           PHYSICAL EXAM:   /80   Pulse 87   Ht 6' 1\" (1.854 m)   Wt 257 lb (116.6 kg)   SpO2 95%   BMI 33.91 kg/m²   HEENT:  No xanthalasma.   No trauma  Chest:

## 2017-07-21 NOTE — PROCEDURES
[facility]    PATIENT'S NAME: Thompson Taveras   ATTENDING PHYSICIAN: Jaymie Palm M.D. OPERATING PHYSICIAN: Jaymie Palm M.D.    PATIENT ACCOUNT#:   [de-identified]    LOCATION:  [address] [place] [address]  MEDICAL RECORD #:   [id number]       ANGY 90% segmentally. Distal to that, the internal mammary artery is inserted into the more distal LAD. During left coronary artery injections, one sees collateral filling of vessels off the distal RCA.   The right coronary artery had a somewhat anomalous take Because of renal insufficiency, I did not make further attempts to find it with, for example, aortography.     RECOMMENDATION:  The patient will be hydrated, allowed to recover from the bolus of contrast, and then brought back for intervention of the right

## 2017-07-21 NOTE — DIETARY NOTE
Nutrition Short Note   Dietitian consult received per cardiac rehab standing order. Pt to be educated by cardiac rehab staff and encouraged to attend outpatient classes taught by RD. RD available PRN.      Madai Kelly RD, LDN  Pager [phone]

## 2017-07-21 NOTE — PLAN OF CARE
CARDIOVASCULAR - ADULT    • Maintains optimal cardiac output and hemodynamic stability Progressing    • Absence of cardiac arrhythmias or at baseline Progressing          Neuro: AOx4    HEENT: Impaired vision (glasses). Resp: Clear. Room air. .     Ca

## 2017-07-21 NOTE — PROCEDURES
[facility] Cardiology      Procedure:   CORONARY, SVG, LIMA ANGIO, PCI MID LAD, ANGIOMAX, PERCLOSE                          RFA      Findings    %    LM: PLAQUE    LCx: 90% OM 1.  100% OM 2    LAD: 99% MID, 905 MORE DISTAL.     RCA: 90% MID    SVG---> DIAG: QUAN

## 2017-07-22 VITALS
HEART RATE: 71 BPM | DIASTOLIC BLOOD PRESSURE: 61 MMHG | BODY MASS INDEX: 34.06 KG/M2 | HEIGHT: 73 IN | SYSTOLIC BLOOD PRESSURE: 139 MMHG | TEMPERATURE: 98 F | OXYGEN SATURATION: 93 % | WEIGHT: 257 LBS | RESPIRATION RATE: 18 BRPM

## 2017-07-22 LAB
ATRIAL RATE: 71 BPM
ATRIAL RATE: 77 BPM
BUN BLD-MCNC: 36 MG/DL (ref 8–20)
CALCIUM BLD-MCNC: 9.2 MG/DL (ref 8.3–10.3)
CHLORIDE: 104 MMOL/L (ref 101–111)
CO2: 25 MMOL/L (ref 22–32)
CREAT BLD-MCNC: 1.89 MG/DL (ref 0.7–1.3)
ERYTHROCYTE [DISTWIDTH] IN BLOOD BY AUTOMATED COUNT: 15.2 % (ref 11.5–16)
GLUCOSE BLD-MCNC: 158 MG/DL (ref 70–99)
GLUCOSE BLD-MCNC: 188 MG/DL (ref 65–99)
HCT VFR BLD AUTO: 35.9 % (ref 37–53)
HGB BLD-MCNC: 11.3 G/DL (ref 13–17)
MCH RBC QN AUTO: 26.1 PG (ref 27–33.2)
MCHC RBC AUTO-ENTMCNC: 31.5 G/DL (ref 31–37)
MCV RBC AUTO: 82.9 FL (ref 80–99)
P AXIS: 13 DEGREES
P AXIS: 9 DEGREES
P-R INTERVAL: 164 MS
P-R INTERVAL: 166 MS
PLATELET # BLD AUTO: 187 10(3)UL (ref 150–450)
POTASSIUM SERPL-SCNC: 4.3 MMOL/L (ref 3.6–5.1)
Q-T INTERVAL: 392 MS
Q-T INTERVAL: 402 MS
QRS DURATION: 100 MS
QRS DURATION: 96 MS
QTC CALCULATION (BEZET): 436 MS
QTC CALCULATION (BEZET): 443 MS
R AXIS: 34 DEGREES
R AXIS: 40 DEGREES
RBC # BLD AUTO: 4.33 X10(6)UL (ref 4.3–5.7)
RED CELL DISTRIBUTION WIDTH-SD: 45.8 FL (ref 35.1–46.3)
SODIUM SERPL-SCNC: 137 MMOL/L (ref 136–144)
T AXIS: 80 DEGREES
T AXIS: 85 DEGREES
VENTRICULAR RATE: 71 BPM
VENTRICULAR RATE: 77 BPM
WBC # BLD AUTO: 7.2 X10(3) UL (ref 4–13)

## 2017-07-22 PROCEDURE — 82962 GLUCOSE BLOOD TEST: CPT

## 2017-07-22 PROCEDURE — 93010 ELECTROCARDIOGRAM REPORT: CPT | Performed by: INTERNAL MEDICINE

## 2017-07-22 PROCEDURE — 85027 COMPLETE CBC AUTOMATED: CPT | Performed by: INTERNAL MEDICINE

## 2017-07-22 PROCEDURE — 80048 BASIC METABOLIC PNL TOTAL CA: CPT | Performed by: INTERNAL MEDICINE

## 2017-07-22 PROCEDURE — 93005 ELECTROCARDIOGRAM TRACING: CPT

## 2017-07-22 RX ORDER — CLOPIDOGREL BISULFATE 75 MG/1
75 TABLET ORAL DAILY
Qty: 30 TABLET | Refills: 11 | Status: SHIPPED | OUTPATIENT
Start: 2017-07-22 | End: 2017-09-08

## 2017-07-22 RX ADMIN — METOPROLOL SUCCINATE 100 MG: 100 TABLET, EXTENDED RELEASE ORAL at 05:15:00

## 2017-07-22 RX ADMIN — ALFUZOSIN HYDROCHLORIDE 10 MG: 10 TABLET, EXTENDED RELEASE ORAL at 08:50:00

## 2017-07-22 RX ADMIN — CLOPIDOGREL BISULFATE 75 MG: 75 TABLET ORAL at 08:51:00

## 2017-07-22 RX ADMIN — ASPIRIN 325 MG: 325 MG TABLET, DELAYED RELEASE (ENTERIC COATED) ORAL at 08:50:00

## 2017-07-22 RX ADMIN — GABAPENTIN 600 MG: 300 CAPSULE ORAL at 08:51:00

## 2017-07-22 RX ADMIN — LOSARTAN POTASSIUM 50 MG: 50 TABLET ORAL at 08:51:00

## 2017-07-22 NOTE — PROGRESS NOTES
[facility] Cardiology  Progress Note    Peña Welch Patient Status:  Observation    1960 MRN [id number]   [facility] 8NE-A Attending Alice Cavazos Day # 0 PCP Shelia Osborne MD     Impression:  CAD s/p CA Encounters:  07/18/17 : 257 lb (116.6 kg)  07/12/17 : 257 lb (116.6 kg)  05/10/17 : 255 lb (115.7 kg)      General: Awake and alert; in no acute distress  Cardiac: Regular rate and regular rhythm; no murmurs/rubs/gallops are appreciated  Lungs: Clear to au K 4.3 07/22/2017    07/22/2017   CO2 25.0 07/22/2017   BUN 36 07/22/2017   CREATSERUM 1.89 07/22/2017    07/22/2017   CA 9.2 07/22/2017       Telemetry: No malignant tachyarrhythmias or bradyarrhythmias      Thank you for allowing our practi

## 2017-07-22 NOTE — RESPIRATORY THERAPY NOTE
RHETT : EQUIPMENT USE: DAILY SUMMARY                                            SET MODE:  CPAP WITH CFLEX                                          USAGE IN HOURS: 6:01                                          90% EX

## 2017-07-22 NOTE — PLAN OF CARE
Patient is alert and oriented. Patient denies pain or discomfort. Surgical site is clean, dry and intact. Vitals are stable and call light in reach. Patient may discharge from cardiology perspective. IV removed, AVS reviewed with teach back method.  Jeri Simon

## 2017-07-22 NOTE — PLAN OF CARE
CARDIOVASCULAR - ADULT    • Maintains optimal cardiac output and hemodynamic stability Progressing    • Absence of cardiac arrhythmias or at baseline Progressing        Cardiac monitor shows. ... SR  S/p baloon and EMILY x 1 to LAD.   Right groin perclose , sof

## 2017-07-22 NOTE — BRIEF PROCEDURE NOTE
Outpatient Surgery Brief Discharge Summary         Patient ID:  Henrique Acosta  [id number]  62year old  6/21/1960    Discharge Diagnoses: cad    Procedures: coronary angio  Discharged Condition: stable    Disposition: home    Patient Instructions:  Follow-

## 2017-07-22 NOTE — PROGRESS NOTES
ASSESSMENT/PLAN:     imp:  1. Angina; cath results noted and pt to return for pci  2. Cki;  Creat mildly increased today  3.  Sleep apnea  Plan  Home today  return in 2 weeks for pci per liban note  Check bmp in 2-3 days  Stay off work until post pci(pt wor Type II or unspecified type diabetes mellitus without mention of complication, not stated as uncontrolled Dx at age 43    Type 2 DM   • Unspecified essential hypertension    • Unspecified sleep apnea DMG SPLIT NIGHT 12-5-12    AHI 93 RDI 97 SaO2 siri 62% Other [OTHER] Father    • Cancer Mother      Skin   • Diabetes Mother      Type 2 DM   • Diabetes Maternal Grandfather      Type 2 DM   • Heart Disorder Maternal Grandfather      CAD   • Diabetes Sister      Gestational DM   • Diabetes Daughter      Type 2 or cyanosis. SKIN:no rashes,lesions. NEURO/PSYCH:alert and oriented. Normal affect. CV: PALP:PMI not displaced; no lifts, thrills or rubs. AUSC: reg rhythm, normal S1, S2 without S3; no murmur. CAROTIDS:carotid pulses normal. ABD AORTA:not enlarged.  FEM

## 2017-08-04 RX ORDER — ALFUZOSIN HYDROCHLORIDE 10 MG/1
10 TABLET, EXTENDED RELEASE ORAL NIGHTLY PRN
COMMUNITY
End: 2017-12-10

## 2017-08-08 ENCOUNTER — HOSPITAL ENCOUNTER (OUTPATIENT)
Dept: INTERVENTIONAL RADIOLOGY/VASCULAR | Facility: HOSPITAL | Age: 57
Setting detail: OBSERVATION
Discharge: HOME | End: 2017-08-09
Attending: INTERNAL MEDICINE | Admitting: INTERNAL MEDICINE
Payer: COMMERCIAL

## 2017-08-08 DIAGNOSIS — I25.10 CAD (CORONARY ARTERY DISEASE): ICD-10-CM

## 2017-08-08 LAB
ATRIAL RATE: 68 BPM
ATRIAL RATE: 72 BPM
GLUCOSE BLD-MCNC: 102 MG/DL (ref 65–99)
GLUCOSE BLD-MCNC: 112 MG/DL (ref 65–99)
GLUCOSE BLD-MCNC: 137 MG/DL (ref 65–99)
GLUCOSE BLD-MCNC: 152 MG/DL (ref 65–99)
GLUCOSE BLD-MCNC: 159 MG/DL (ref 65–99)
P AXIS: 13 DEGREES
P AXIS: 174 DEGREES
P-R INTERVAL: 176 MS
P-R INTERVAL: 182 MS
Q-T INTERVAL: 414 MS
Q-T INTERVAL: 414 MS
QRS DURATION: 96 MS
QRS DURATION: 98 MS
QTC CALCULATION (BEZET): 440 MS
QTC CALCULATION (BEZET): 453 MS
R AXIS: 146 DEGREES
R AXIS: 26 DEGREES
T AXIS: 92 DEGREES
T AXIS: 93 DEGREES
VENTRICULAR RATE: 68 BPM
VENTRICULAR RATE: 72 BPM

## 2017-08-08 PROCEDURE — 82962 GLUCOSE BLOOD TEST: CPT

## 2017-08-08 PROCEDURE — 99153 MOD SED SAME PHYS/QHP EA: CPT

## 2017-08-08 PROCEDURE — 027036Z DILATION OF CORONARY ARTERY, ONE ARTERY WITH THREE DRUG-ELUTING INTRALUMINAL DEVICES, PERCUTANEOUS APPROACH: ICD-10-PCS | Performed by: INTERNAL MEDICINE

## 2017-08-08 PROCEDURE — 02C03ZZ EXTIRPATION OF MATTER FROM CORONARY ARTERY, ONE ARTERY, PERCUTANEOUS APPROACH: ICD-10-PCS | Performed by: INTERNAL MEDICINE

## 2017-08-08 PROCEDURE — 3E033PZ INTRODUCTION OF PLATELET INHIBITOR INTO PERIPHERAL VEIN, PERCUTANEOUS APPROACH: ICD-10-PCS | Performed by: INTERNAL MEDICINE

## 2017-08-08 PROCEDURE — 94660 CPAP INITIATION&MGMT: CPT

## 2017-08-08 PROCEDURE — 93010 ELECTROCARDIOGRAM REPORT: CPT | Performed by: INTERNAL MEDICINE

## 2017-08-08 PROCEDURE — 99152 MOD SED SAME PHYS/QHP 5/>YRS: CPT

## 2017-08-08 PROCEDURE — 93005 ELECTROCARDIOGRAM TRACING: CPT

## 2017-08-08 RX ORDER — MONTELUKAST SODIUM 10 MG/1
10 TABLET ORAL NIGHTLY
Status: DISCONTINUED | OUTPATIENT
Start: 2017-08-08 | End: 2017-08-09

## 2017-08-08 RX ORDER — KETOCONAZOLE 20 MG/G
1 CREAM TOPICAL DAILY
Status: DISCONTINUED | OUTPATIENT
Start: 2017-08-08 | End: 2017-08-09

## 2017-08-08 RX ORDER — SODIUM CHLORIDE 9 MG/ML
INJECTION, SOLUTION INTRAVENOUS CONTINUOUS
Status: ACTIVE | OUTPATIENT
Start: 2017-08-08 | End: 2017-08-08

## 2017-08-08 RX ORDER — ASPIRIN 81 MG/1
324 TABLET, CHEWABLE ORAL ONCE
Status: DISCONTINUED | OUTPATIENT
Start: 2017-08-08 | End: 2017-08-08 | Stop reason: HOSPADM

## 2017-08-08 RX ORDER — CLOPIDOGREL BISULFATE 75 MG/1
75 TABLET ORAL DAILY
Status: DISCONTINUED | OUTPATIENT
Start: 2017-08-09 | End: 2017-08-09

## 2017-08-08 RX ORDER — AMITRIPTYLINE HYDROCHLORIDE 25 MG/1
25 TABLET, FILM COATED ORAL NIGHTLY
Status: DISCONTINUED | OUTPATIENT
Start: 2017-08-08 | End: 2017-08-09

## 2017-08-08 RX ORDER — ALFUZOSIN HYDROCHLORIDE 10 MG/1
10 TABLET, EXTENDED RELEASE ORAL DAILY
Status: DISCONTINUED | OUTPATIENT
Start: 2017-08-08 | End: 2017-08-09

## 2017-08-08 RX ORDER — MIDAZOLAM HYDROCHLORIDE 1 MG/ML
INJECTION INTRAMUSCULAR; INTRAVENOUS
Status: COMPLETED
Start: 2017-08-08 | End: 2017-08-08

## 2017-08-08 RX ORDER — HEPARIN SODIUM 5000 [USP'U]/ML
INJECTION, SOLUTION INTRAVENOUS; SUBCUTANEOUS
Status: COMPLETED
Start: 2017-08-08 | End: 2017-08-08

## 2017-08-08 RX ORDER — ASPIRIN 81 MG/1
81 TABLET, CHEWABLE ORAL DAILY
Status: DISCONTINUED | OUTPATIENT
Start: 2017-08-08 | End: 2017-08-09

## 2017-08-08 RX ORDER — METOPROLOL SUCCINATE 50 MG/1
50 TABLET, EXTENDED RELEASE ORAL
Status: DISCONTINUED | OUTPATIENT
Start: 2017-08-08 | End: 2017-08-09

## 2017-08-08 RX ORDER — DEXTROSE MONOHYDRATE 25 G/50ML
50 INJECTION, SOLUTION INTRAVENOUS
Status: DISCONTINUED | OUTPATIENT
Start: 2017-08-08 | End: 2017-08-09

## 2017-08-08 RX ORDER — DOCUSATE SODIUM 100 MG/1
100 CAPSULE, LIQUID FILLED ORAL 2 TIMES DAILY
Status: DISCONTINUED | OUTPATIENT
Start: 2017-08-08 | End: 2017-08-09

## 2017-08-08 RX ORDER — CLOPIDOGREL BISULFATE 75 MG/1
TABLET ORAL
Status: COMPLETED
Start: 2017-08-08 | End: 2017-08-08

## 2017-08-08 RX ORDER — ATORVASTATIN CALCIUM 80 MG/1
80 TABLET, FILM COATED ORAL NIGHTLY
Status: DISCONTINUED | OUTPATIENT
Start: 2017-08-08 | End: 2017-08-09

## 2017-08-08 RX ORDER — DOXEPIN HYDROCHLORIDE 50 MG/1
1 CAPSULE ORAL DAILY
Status: DISCONTINUED | OUTPATIENT
Start: 2017-08-08 | End: 2017-08-09

## 2017-08-08 RX ORDER — GABAPENTIN 300 MG/1
600 CAPSULE ORAL 3 TIMES DAILY
Status: DISCONTINUED | OUTPATIENT
Start: 2017-08-08 | End: 2017-08-09

## 2017-08-08 RX ORDER — LIDOCAINE HYDROCHLORIDE 10 MG/ML
INJECTION, SOLUTION INFILTRATION; PERINEURAL
Status: COMPLETED
Start: 2017-08-08 | End: 2017-08-08

## 2017-08-08 RX ORDER — ALBUTEROL SULFATE 90 UG/1
2 AEROSOL, METERED RESPIRATORY (INHALATION) EVERY 4 HOURS PRN
Status: DISCONTINUED | OUTPATIENT
Start: 2017-08-08 | End: 2017-08-09

## 2017-08-08 RX ADMIN — DOCUSATE SODIUM 100 MG: 100 CAPSULE, LIQUID FILLED ORAL at 21:12:00

## 2017-08-08 RX ADMIN — SODIUM CHLORIDE: 9 INJECTION, SOLUTION INTRAVENOUS at 07:00:00

## 2017-08-08 RX ADMIN — GABAPENTIN 600 MG: 300 CAPSULE ORAL at 17:11:00

## 2017-08-08 RX ADMIN — SODIUM CHLORIDE: 9 INJECTION, SOLUTION INTRAVENOUS at 12:30:00

## 2017-08-08 RX ADMIN — ATORVASTATIN CALCIUM 80 MG: 80 TABLET, FILM COATED ORAL at 21:12:00

## 2017-08-08 RX ADMIN — GABAPENTIN 600 MG: 300 CAPSULE ORAL at 21:13:00

## 2017-08-08 RX ADMIN — SODIUM CHLORIDE: 9 INJECTION, SOLUTION INTRAVENOUS at 11:52:00

## 2017-08-08 RX ADMIN — MONTELUKAST SODIUM 10 MG: 10 TABLET ORAL at 21:12:00

## 2017-08-08 RX ADMIN — DOXEPIN HYDROCHLORIDE 1 TABLET: 50 CAPSULE ORAL at 17:11:00

## 2017-08-08 RX ADMIN — AMITRIPTYLINE HYDROCHLORIDE 25 MG: 25 TABLET, FILM COATED ORAL at 21:13:00

## 2017-08-08 RX ADMIN — METOPROLOL SUCCINATE 50 MG: 50 TABLET, EXTENDED RELEASE ORAL at 17:11:00

## 2017-08-08 NOTE — PROCEDURES
[facility]    PATIENT'S NAME: Alexydustin Jacek   ATTENDING PHYSICIAN: Landen Leonard M.D. OPERATING PHYSICIAN: Landen Leonard M.D.    PATIENT ACCOUNT#:   [de-identified]    LOCATION:  [address] [place] [address]  MEDICAL RECORD #:   [id number]       ANGY decided to go ahead with Rotablator because there was one stenosis that was not expanding with this balloon. For the Rotablator, we exchanged the Runthrough wire out through a catheter and a floppy wire in.   We proceeded to Rotablator the proximal and mid

## 2017-08-08 NOTE — PROCEDURES
[facility] Cardiology      Procedure:  PTCA, ROTOBLATION, EMILY PROX AND MID RCA , ANGIOMAX, PERCLOSE RFA          RESULTS: PTCA/ROTO/STENT RCA, 90% TO 0% WITH 2.75 X23 MM + 3.0 X 23 MM + 3.5 X 23 MM ALPINE DE STENTS

## 2017-08-08 NOTE — PROGRESS NOTES
Patient arrived to floor via cart from cath lab. Right groin venous sheath intact, 5 Syriac. Right groin arterial sheath pulled and closed with perclose, dressing intact. Soft, dry, nontender, no hematoma. +2 pedal pulses. Q 15 minute checks initiated.

## 2017-08-08 NOTE — H&P
He still notes sternal pressure with 30 minutes of walking.   Plan for PCI RCA possibly OM 1 today  Will limit contrast given elevated Cr

## 2017-08-08 NOTE — CONSULTS
General Medicine Consult      Reason for consult: post-op medical management     Consulted by: Dr. Jo-Ann Cutler    PCP: Santi Solis MD      History of Present Illness: Patient is a 62year old male with PMH sig for CAD, anemia, CKD, depression, HTN, HL p without mention of complication, not stated as uncontrolled Dx at age 43    Type 2 DM   • Unspecified essential hypertension    • Unspecified sleep apnea DMG SPLIT NIGHT 12-5-12    AHI 93 RDI 97 SaO2 siri 62%  CPAP 11 Premier   • Valvular disease    • Vis Taking for the 8/8/17 encounter [facility] Encounter) with [address] [facility] [address] IVS CATH:  insulin detemir (LEVEMIR FLEXPEN) 100 UNIT/ML Subcutaneous Solution Pen-injector Inject 45 Units into the skin nightly.  Disp:  Rfl:    Insulin Aspart Pen (NOVOLOG FLEXPEN) 100 UNIT/ML (twelve) hours. Please dispense 5 boxes for 3 month supply (Patient taking differently: Inject 40 Units into the skin daily.  Please dispense 5 boxes for 3 month supply ) Disp: 75 mL Rfl: 1   ONETOUCH VERIO In Vitro Strip USE AS DIRECTED TO TEST BLOOD SUGAR History   Problem Relation Age of Onset   • Heart Surgery Father    • Heart Disease Father    • Cancer Father      Esophageal cancer   • Heart Disorder Father      CABG at age 46s   • Diabetes Father      Type 2 DM   • Other [OTHER] Father    • Cancer Moth 450.0 10(3)uL  187.0   MCV      80.0 - 99.0 fL  82.9   MCH      27.0 - 33.2 pg  26.1 (L)   MCHC      31.0 - 37.0 g/dL  31.5   RDW      11.5 - 16.0 %  15.2   RDW-SD      35.1 - 46.3 fL  45.8   Sodium      136 - 144 mmol/L 138    Potassium      3.60 - 5.10 m

## 2017-08-09 VITALS
WEIGHT: 257 LBS | HEART RATE: 81 BPM | TEMPERATURE: 98 F | SYSTOLIC BLOOD PRESSURE: 124 MMHG | HEIGHT: 73 IN | DIASTOLIC BLOOD PRESSURE: 71 MMHG | BODY MASS INDEX: 34.06 KG/M2 | OXYGEN SATURATION: 97 % | RESPIRATION RATE: 18 BRPM

## 2017-08-09 LAB
ATRIAL RATE: 78 BPM
BASOPHILS # BLD AUTO: 0.03 X10(3) UL (ref 0–0.1)
BASOPHILS NFR BLD AUTO: 0.4 %
BUN BLD-MCNC: 35 MG/DL (ref 8–20)
CALCIUM BLD-MCNC: 8.5 MG/DL (ref 8.3–10.3)
CHLORIDE: 106 MMOL/L (ref 101–111)
CO2: 27 MMOL/L (ref 22–32)
CREAT BLD-MCNC: 1.89 MG/DL (ref 0.7–1.3)
EOSINOPHIL # BLD AUTO: 0.18 X10(3) UL (ref 0–0.3)
EOSINOPHIL NFR BLD AUTO: 2.4 %
ERYTHROCYTE [DISTWIDTH] IN BLOOD BY AUTOMATED COUNT: 14.8 % (ref 11.5–16)
EST. AVERAGE GLUCOSE BLD GHB EST-MCNC: 180 MG/DL (ref 68–126)
GLUCOSE BLD-MCNC: 121 MG/DL (ref 70–99)
GLUCOSE BLD-MCNC: 124 MG/DL (ref 65–99)
GLUCOSE BLD-MCNC: 193 MG/DL (ref 65–99)
HBA1C MFR BLD HPLC: 7.9 % (ref ?–5.7)
HCT VFR BLD AUTO: 34 % (ref 37–53)
HGB BLD-MCNC: 10.9 G/DL (ref 13–17)
IMMATURE GRANULOCYTE COUNT: 0.04 X10(3) UL (ref 0–1)
IMMATURE GRANULOCYTE RATIO %: 0.5 %
LYMPHOCYTES # BLD AUTO: 2.63 X10(3) UL (ref 0.9–4)
LYMPHOCYTES NFR BLD AUTO: 35.1 %
MCH RBC QN AUTO: 26.5 PG (ref 27–33.2)
MCHC RBC AUTO-ENTMCNC: 32.1 G/DL (ref 31–37)
MCV RBC AUTO: 82.5 FL (ref 80–99)
MONOCYTES # BLD AUTO: 0.49 X10(3) UL (ref 0.1–0.6)
MONOCYTES NFR BLD AUTO: 6.5 %
NEUTROPHIL ABS PRELIM: 4.12 X10 (3) UL (ref 1.3–6.7)
NEUTROPHILS # BLD AUTO: 4.12 X10(3) UL (ref 1.3–6.7)
NEUTROPHILS NFR BLD AUTO: 55.1 %
P AXIS: 12 DEGREES
P-R INTERVAL: 184 MS
PLATELET # BLD AUTO: 164 10(3)UL (ref 150–450)
POTASSIUM SERPL-SCNC: 3.9 MMOL/L (ref 3.6–5.1)
Q-T INTERVAL: 398 MS
QRS DURATION: 102 MS
QTC CALCULATION (BEZET): 453 MS
R AXIS: 29 DEGREES
RBC # BLD AUTO: 4.12 X10(6)UL (ref 4.3–5.7)
RED CELL DISTRIBUTION WIDTH-SD: 45.1 FL (ref 35.1–46.3)
SODIUM SERPL-SCNC: 139 MMOL/L (ref 136–144)
T AXIS: 80 DEGREES
VENTRICULAR RATE: 78 BPM
WBC # BLD AUTO: 7.5 X10(3) UL (ref 4–13)

## 2017-08-09 PROCEDURE — 93010 ELECTROCARDIOGRAM REPORT: CPT | Performed by: INTERNAL MEDICINE

## 2017-08-09 PROCEDURE — 93005 ELECTROCARDIOGRAM TRACING: CPT

## 2017-08-09 PROCEDURE — 85025 COMPLETE CBC W/AUTO DIFF WBC: CPT | Performed by: INTERNAL MEDICINE

## 2017-08-09 PROCEDURE — 94640 AIRWAY INHALATION TREATMENT: CPT

## 2017-08-09 PROCEDURE — 80048 BASIC METABOLIC PNL TOTAL CA: CPT | Performed by: INTERNAL MEDICINE

## 2017-08-09 PROCEDURE — 83036 HEMOGLOBIN GLYCOSYLATED A1C: CPT | Performed by: HOSPITALIST

## 2017-08-09 PROCEDURE — 82962 GLUCOSE BLOOD TEST: CPT

## 2017-08-09 RX ADMIN — DOXEPIN HYDROCHLORIDE 1 TABLET: 50 CAPSULE ORAL at 09:11:00

## 2017-08-09 RX ADMIN — CLOPIDOGREL BISULFATE 75 MG: 75 TABLET ORAL at 09:11:00

## 2017-08-09 RX ADMIN — KETOCONAZOLE 1 APPLICATION: 20 CREAM TOPICAL at 09:10:00

## 2017-08-09 RX ADMIN — DOCUSATE SODIUM 100 MG: 100 CAPSULE, LIQUID FILLED ORAL at 09:11:00

## 2017-08-09 RX ADMIN — ALFUZOSIN HYDROCHLORIDE 10 MG: 10 TABLET, EXTENDED RELEASE ORAL at 09:10:00

## 2017-08-09 RX ADMIN — GABAPENTIN 600 MG: 300 CAPSULE ORAL at 09:11:00

## 2017-08-09 RX ADMIN — GABAPENTIN 600 MG: 300 CAPSULE ORAL at 15:29:00

## 2017-08-09 RX ADMIN — ASPIRIN 81 MG: 81 TABLET, CHEWABLE ORAL at 09:11:00

## 2017-08-09 RX ADMIN — METOPROLOL SUCCINATE 50 MG: 50 TABLET, EXTENDED RELEASE ORAL at 05:55:00

## 2017-08-09 NOTE — DIETARY NOTE
Nutrition Short Note   Dietitian consult received per cardiac rehab standing order. Pt to be educated by cardiac rehab staff and encouraged to attend outpatient classes taught by NIA. NIA available PRN.      Pradip Friday, NIA, LDN  Pager [phone]

## 2017-08-09 NOTE — RESPIRATORY THERAPY NOTE
RHETT - Equipment Use Daily Summary:                  . Set Mode: AUTO CPAP WITH C-FLEX                . Usage in hours: 7:00                . 90% Pressure (EPAP) level: 15                . 90% Insp. Pressure (IPAP): Michelle Pugh AHI: 1.3                .

## 2017-08-09 NOTE — PROGRESS NOTES
[facility] Cardiology Progress Note        Arcelia Vaughn Patient Status:  Inpatient    1960 MRN [id number]   [facility] Attending Shlomo Herr Day # 1 PCP Hung Enriquez MD     Subjective 18  BP: 124/71  General:  Appears comfortable  HEENT: No focal deficits. Neck: No JVD, carotids 2+ no bruits. Cardiac: Regular S1S2. No S3, S4, rub, click. No murmur. Lungs: Clear to auscultation and percussion. Abdomen: Soft, non-tender.    Jethro Vaughn normal on 10/10/13  3. DM  4. HTN, orthostasis noted in the past  5. Dyslipidemia. Very high TG's. Already on fibric acid. He needs to lose weight and reduce carb consumption.   6. Hx or  Kidney CA  7. CRI, Cr = 1.8-1.9    Plan:    Home today  Out patibashir

## 2017-08-09 NOTE — PROGRESS NOTES
[facility] Hospitalist Progress Note     Henrique Acosta Patient Status:  Inpatient    1960 MRN [id number]   [facility] 2NE-A Attending Dung Reece*   Hosp Day # 1 PCP Norvel Lefort, MD     CC: follow up    SUBJECTIVE:  Pt in [place] [address] 100 mg Oral BID   • Alfuzosin HCl ER  10 mg Oral Daily   • multivitamin  1 tablet Oral Daily   • Insulin Aspart Pen  4-20 Units Subcutaneous TID CC and HS     Continuous Infusing Medication:   PRN Medication:Albuterol Sulfate HFA, glucose **OR** Glucose-Vi

## 2017-08-09 NOTE — PROGRESS NOTES
Patient discharged at this time. Discharge instructions given, all questions answered. Tele and IV catheter D/C'd intact. Patient verbalized understanding of discharge instructions. Medications reviewed with patient, no new prescriptions given.

## 2017-08-10 NOTE — PAYOR COMM NOTE
--------------  ADMISSION REVIEW     Payor: Shlomo [facility] #:  [id number]  Authorization Number: NONE REQUIRED     Admit date: 8/8/17  Admit time: 46       Admitting Physician: Sis Hudson MD  Attending Physician:  No at

## 2017-08-16 ENCOUNTER — HOSPITAL ENCOUNTER (OUTPATIENT)
Dept: CT IMAGING | Facility: HOSPITAL | Age: 57
Discharge: HOME | End: 2017-08-16
Attending: NURSE PRACTITIONER
Payer: COMMERCIAL

## 2017-08-16 VITALS — SYSTOLIC BLOOD PRESSURE: 98 MMHG | DIASTOLIC BLOOD PRESSURE: 63 MMHG | HEART RATE: 68 BPM | RESPIRATION RATE: 17 BRPM

## 2017-08-16 DIAGNOSIS — I25.10 CORONARY ARTERY DISEASE INVOLVING NATIVE CORONARY ARTERY OF NATIVE HEART WITHOUT ANGINA PECTORIS: ICD-10-CM

## 2017-08-16 PROCEDURE — 96360 HYDRATION IV INFUSION INIT: CPT | Performed by: NURSE PRACTITIONER

## 2017-08-16 PROCEDURE — 75574 CT ANGIO HRT W/3D IMAGE: CPT | Performed by: NURSE PRACTITIONER

## 2017-08-16 RX ORDER — NITROGLYCERIN 0.4 MG/1
TABLET SUBLINGUAL
Status: COMPLETED
Start: 2017-08-16 | End: 2017-08-16

## 2017-08-16 RX ORDER — SODIUM CHLORIDE 9 MG/ML
INJECTION, SOLUTION INTRAVENOUS ONCE
Status: COMPLETED | OUTPATIENT
Start: 2017-08-16 | End: 2017-08-16

## 2017-08-16 RX ORDER — DILTIAZEM HYDROCHLORIDE 5 MG/ML
5 INJECTION INTRAVENOUS
Status: DISCONTINUED | OUTPATIENT
Start: 2017-08-16 | End: 2017-08-20

## 2017-08-16 RX ORDER — METOPROLOL TARTRATE 5 MG/5ML
INJECTION INTRAVENOUS
Status: COMPLETED
Start: 2017-08-16 | End: 2017-08-16

## 2017-08-16 RX ADMIN — DILTIAZEM HYDROCHLORIDE 5 MG: 5 INJECTION INTRAVENOUS at 14:10:00

## 2017-08-16 RX ADMIN — SODIUM CHLORIDE: 9 INJECTION, SOLUTION INTRAVENOUS at 12:50:00

## 2017-08-16 RX ADMIN — DILTIAZEM HYDROCHLORIDE 5 MG: 5 INJECTION INTRAVENOUS at 14:20:00

## 2017-08-16 RX ADMIN — METOPROLOL TARTRATE 5 MG: 5 INJECTION INTRAVENOUS at 14:15:00

## 2017-08-16 RX ADMIN — NITROGLYCERIN: 0.4 TABLET SUBLINGUAL at 14:30:00

## 2017-08-16 RX ADMIN — DILTIAZEM HYDROCHLORIDE 5 MG: 5 INJECTION INTRAVENOUS at 14:05:00

## 2017-08-16 NOTE — IMAGING NOTE
Pt s/p CT Gated coronary study. Received metoprolol and cardizem per protocol. Walked pt to exit and pt denies any complaints. Discharged to home.

## 2017-08-29 ENCOUNTER — HOSPITAL ENCOUNTER (OUTPATIENT)
Dept: INTERVENTIONAL RADIOLOGY/VASCULAR | Facility: HOSPITAL | Age: 57
Setting detail: OBSERVATION
Discharge: HOME | End: 2017-08-30
Attending: INTERNAL MEDICINE | Admitting: INTERNAL MEDICINE
Payer: COMMERCIAL

## 2017-08-29 DIAGNOSIS — I25.10 CAD (CORONARY ARTERY DISEASE): ICD-10-CM

## 2017-08-29 PROCEDURE — 82962 GLUCOSE BLOOD TEST: CPT

## 2017-08-29 PROCEDURE — B211YZZ FLUOROSCOPY OF MULTIPLE CORONARY ARTERIES USING OTHER CONTRAST: ICD-10-PCS | Performed by: INTERNAL MEDICINE

## 2017-08-29 PROCEDURE — 93010 ELECTROCARDIOGRAM REPORT: CPT | Performed by: INTERNAL MEDICINE

## 2017-08-29 PROCEDURE — 96360 HYDRATION IV INFUSION INIT: CPT

## 2017-08-29 PROCEDURE — 94660 CPAP INITIATION&MGMT: CPT

## 2017-08-29 PROCEDURE — 93005 ELECTROCARDIOGRAM TRACING: CPT

## 2017-08-29 PROCEDURE — 99152 MOD SED SAME PHYS/QHP 5/>YRS: CPT

## 2017-08-29 PROCEDURE — 99153 MOD SED SAME PHYS/QHP EA: CPT

## 2017-08-29 PROCEDURE — 93455 CORONARY ART/GRFT ANGIO S&I: CPT

## 2017-08-29 PROCEDURE — 0270356 DILATION OF CORONARY ARTERY, ONE ARTERY, BIFURCATION, WITH TWO DRUG-ELUTING INTRALUMINAL DEVICES, PERCUTANEOUS APPROACH: ICD-10-PCS | Performed by: INTERNAL MEDICINE

## 2017-08-29 PROCEDURE — B212YZZ FLUOROSCOPY OF SINGLE CORONARY ARTERY BYPASS GRAFT USING OTHER CONTRAST: ICD-10-PCS | Performed by: INTERNAL MEDICINE

## 2017-08-29 RX ORDER — AMITRIPTYLINE HYDROCHLORIDE 25 MG/1
25 TABLET, FILM COATED ORAL NIGHTLY
Status: DISCONTINUED | OUTPATIENT
Start: 2017-08-29 | End: 2017-08-30

## 2017-08-29 RX ORDER — SODIUM CHLORIDE 9 MG/ML
INJECTION, SOLUTION INTRAVENOUS CONTINUOUS
Status: ACTIVE | OUTPATIENT
Start: 2017-08-29 | End: 2017-08-29

## 2017-08-29 RX ORDER — DEXTROSE MONOHYDRATE 25 G/50ML
50 INJECTION, SOLUTION INTRAVENOUS
Status: DISCONTINUED | OUTPATIENT
Start: 2017-08-29 | End: 2017-08-30

## 2017-08-29 RX ORDER — FERROUS SULFATE TAB EC 324 MG (65 MG FE EQUIVALENT) 324 (65 FE) MG
324 TABLET DELAYED RESPONSE ORAL EVERY OTHER DAY
COMMUNITY

## 2017-08-29 RX ORDER — SODIUM CHLORIDE 9 MG/ML
INJECTION, SOLUTION INTRAVENOUS CONTINUOUS
Status: DISCONTINUED | OUTPATIENT
Start: 2017-08-29 | End: 2017-08-30

## 2017-08-29 RX ORDER — CLOPIDOGREL BISULFATE 75 MG/1
75 TABLET ORAL DAILY
Status: DISCONTINUED | OUTPATIENT
Start: 2017-08-30 | End: 2017-08-30

## 2017-08-29 RX ORDER — ACETAMINOPHEN 325 MG/1
650 TABLET ORAL EVERY 6 HOURS PRN
Status: DISCONTINUED | OUTPATIENT
Start: 2017-08-29 | End: 2017-08-30

## 2017-08-29 RX ORDER — MIDAZOLAM HYDROCHLORIDE 1 MG/ML
INJECTION INTRAMUSCULAR; INTRAVENOUS
Status: COMPLETED
Start: 2017-08-29 | End: 2017-08-29

## 2017-08-29 RX ORDER — LOSARTAN POTASSIUM 50 MG/1
50 TABLET ORAL DAILY
Status: DISCONTINUED | OUTPATIENT
Start: 2017-08-30 | End: 2017-08-30

## 2017-08-29 RX ORDER — HEPARIN SODIUM 5000 [USP'U]/ML
INJECTION, SOLUTION INTRAVENOUS; SUBCUTANEOUS
Status: COMPLETED
Start: 2017-08-29 | End: 2017-08-29

## 2017-08-29 RX ORDER — LIDOCAINE HYDROCHLORIDE 10 MG/ML
INJECTION, SOLUTION INFILTRATION; PERINEURAL
Status: COMPLETED
Start: 2017-08-29 | End: 2017-08-29

## 2017-08-29 RX ORDER — DOCUSATE SODIUM 100 MG/1
100 CAPSULE, LIQUID FILLED ORAL 2 TIMES DAILY
Status: DISCONTINUED | OUTPATIENT
Start: 2017-08-29 | End: 2017-08-30

## 2017-08-29 RX ORDER — MECLIZINE HYDROCHLORIDE 25 MG/1
25 TABLET ORAL 3 TIMES DAILY PRN
Status: DISCONTINUED | OUTPATIENT
Start: 2017-08-29 | End: 2017-08-30

## 2017-08-29 RX ORDER — CLOPIDOGREL BISULFATE 75 MG/1
TABLET ORAL
Status: COMPLETED
Start: 2017-08-29 | End: 2017-08-29

## 2017-08-29 RX ORDER — ATORVASTATIN CALCIUM 80 MG/1
80 TABLET, FILM COATED ORAL NIGHTLY
Status: DISCONTINUED | OUTPATIENT
Start: 2017-08-29 | End: 2017-08-30

## 2017-08-29 RX ORDER — GABAPENTIN 300 MG/1
600 CAPSULE ORAL 3 TIMES DAILY
Status: DISCONTINUED | OUTPATIENT
Start: 2017-08-29 | End: 2017-08-30

## 2017-08-29 RX ORDER — METOPROLOL SUCCINATE 100 MG/1
100 TABLET, EXTENDED RELEASE ORAL DAILY
Status: DISCONTINUED | OUTPATIENT
Start: 2017-08-29 | End: 2017-08-29

## 2017-08-29 RX ORDER — ASPIRIN 81 MG/1
81 TABLET, CHEWABLE ORAL DAILY
Status: DISCONTINUED | OUTPATIENT
Start: 2017-08-30 | End: 2017-08-30

## 2017-08-29 RX ORDER — SODIUM CHLORIDE 9 MG/ML
INJECTION, SOLUTION INTRAVENOUS ONCE
Status: COMPLETED | OUTPATIENT
Start: 2017-08-29 | End: 2017-08-30

## 2017-08-29 RX ORDER — ASPIRIN 81 MG/1
324 TABLET, CHEWABLE ORAL ONCE
Status: DISCONTINUED | OUTPATIENT
Start: 2017-08-29 | End: 2017-08-29 | Stop reason: HOSPADM

## 2017-08-29 RX ORDER — ALFUZOSIN HYDROCHLORIDE 10 MG/1
10 TABLET, EXTENDED RELEASE ORAL DAILY
Status: DISCONTINUED | OUTPATIENT
Start: 2017-08-30 | End: 2017-08-30

## 2017-08-29 RX ORDER — METOPROLOL SUCCINATE 50 MG/1
50 TABLET, EXTENDED RELEASE ORAL
Status: DISCONTINUED | OUTPATIENT
Start: 2017-08-29 | End: 2017-08-30

## 2017-08-29 RX ADMIN — ATORVASTATIN CALCIUM 80 MG: 80 TABLET, FILM COATED ORAL at 21:00:00

## 2017-08-29 RX ADMIN — METOPROLOL SUCCINATE 50 MG: 50 TABLET, EXTENDED RELEASE ORAL at 18:03:00

## 2017-08-29 RX ADMIN — SODIUM CHLORIDE: 9 INJECTION, SOLUTION INTRAVENOUS at 08:39:00

## 2017-08-29 RX ADMIN — SODIUM CHLORIDE: 9 INJECTION, SOLUTION INTRAVENOUS at 11:14:00

## 2017-08-29 RX ADMIN — AMITRIPTYLINE HYDROCHLORIDE 25 MG: 25 TABLET, FILM COATED ORAL at 20:59:00

## 2017-08-29 RX ADMIN — ACETAMINOPHEN 650 MG: 325 TABLET ORAL at 18:32:00

## 2017-08-29 RX ADMIN — GABAPENTIN 600 MG: 300 CAPSULE ORAL at 16:05:00

## 2017-08-29 RX ADMIN — DOCUSATE SODIUM 100 MG: 100 CAPSULE, LIQUID FILLED ORAL at 21:00:00

## 2017-08-29 RX ADMIN — GABAPENTIN 600 MG: 300 CAPSULE ORAL at 20:59:00

## 2017-08-29 NOTE — CM/SW NOTE
08/29/17 1425   CM/SW Screening   Referral [facility] staff; Chart review;Nursing rounds   Patient's Mental Status Alert;Oriented   Patient Status Prior to Admission   Independent with ADLs and Mobility Yes   Discharge N

## 2017-08-29 NOTE — PROCEDURES
[facility]    PATIENT'S NAME: Cathi Ellis   ATTENDING PHYSICIAN: Marquez Dawn M.D. OPERATING PHYSICIAN: Marquez Dawn M.D.    PATIENT ACCOUNT#:   [de-identified]    LOCATION:  [address] [place] [address]  MEDICAL RECORD #:   [id number]       ANGY graft to the OM2 is widely patent throughout its course. It is anastomosed to a medium-sized OM2 which has narrowing of perhaps 40% to 50% beyond the vein artery anastomosis.   Through retrograde filling of the more proximal portion of the OM and circumfle

## 2017-08-29 NOTE — PROCEDURES
[facility] Cardiology      Procedure:  SVG--->OM 2 ANGIO, PCI PROX LCX/OM 1, PERCLOSE RFA      Findings    SVG---> OM 2 : PATENT    RESULTS: PTCA/STENT PROX LCX/OM 1, 90% TO  0% WITH 2.5 X 16 + 2.5 X 12 MM SYNERGY DE STENTS

## 2017-08-30 VITALS
DIASTOLIC BLOOD PRESSURE: 97 MMHG | WEIGHT: 252 LBS | HEIGHT: 73 IN | TEMPERATURE: 98 F | HEART RATE: 75 BPM | BODY MASS INDEX: 33.4 KG/M2 | RESPIRATION RATE: 18 BRPM | SYSTOLIC BLOOD PRESSURE: 129 MMHG | OXYGEN SATURATION: 98 %

## 2017-08-30 PROCEDURE — 85025 COMPLETE CBC W/AUTO DIFF WBC: CPT | Performed by: INTERNAL MEDICINE

## 2017-08-30 PROCEDURE — 80048 BASIC METABOLIC PNL TOTAL CA: CPT | Performed by: INTERNAL MEDICINE

## 2017-08-30 PROCEDURE — 82962 GLUCOSE BLOOD TEST: CPT

## 2017-08-30 RX ORDER — SODIUM CHLORIDE 9 MG/ML
INJECTION, SOLUTION INTRAVENOUS CONTINUOUS
Status: DISCONTINUED | OUTPATIENT
Start: 2017-08-30 | End: 2017-08-30

## 2017-08-30 RX ORDER — CLOPIDOGREL BISULFATE 75 MG/1
75 TABLET ORAL DAILY
Status: DISCONTINUED | OUTPATIENT
Start: 2017-08-31 | End: 2017-08-30

## 2017-08-30 RX ADMIN — METOPROLOL SUCCINATE 50 MG: 50 TABLET, EXTENDED RELEASE ORAL at 04:35:00

## 2017-08-30 RX ADMIN — DOCUSATE SODIUM 100 MG: 100 CAPSULE, LIQUID FILLED ORAL at 08:40:00

## 2017-08-30 RX ADMIN — ALFUZOSIN HYDROCHLORIDE 10 MG: 10 TABLET, EXTENDED RELEASE ORAL at 08:40:00

## 2017-08-30 RX ADMIN — ASPIRIN 81 MG: 81 TABLET, CHEWABLE ORAL at 08:40:00

## 2017-08-30 RX ADMIN — GABAPENTIN 600 MG: 300 CAPSULE ORAL at 08:40:00

## 2017-08-30 RX ADMIN — CLOPIDOGREL BISULFATE 75 MG: 75 TABLET ORAL at 08:40:00

## 2017-08-30 RX ADMIN — LOSARTAN POTASSIUM 50 MG: 50 TABLET ORAL at 08:40:00

## 2017-08-30 RX ADMIN — ACETAMINOPHEN 650 MG: 325 TABLET ORAL at 11:29:00

## 2017-08-30 NOTE — PLAN OF CARE
Voided freely, IV fluids at 125cc/hr  ambulating in the room and short distance in the swift  C/o mild soreness on right groin ,area soft and dry and intact  Dr Vinnie Chan with order for pain med, given, with relief  Tele SR

## 2017-08-30 NOTE — PLAN OF CARE
Received from cath lab after PTCA/STENT to OM and CIRC  Alert,oriented x3, denies chest pain, tele SR  Right groin site clean, soft and dry  Good pedal pulses bilat

## 2017-08-30 NOTE — DISCHARGE PLANNING
NURSING DISCHARGE NOTE    Discharged Home via Ambulatory. Accompanied by RN and Spouse  Belongings Taken by patient/family. Pt and wife given discharge instructions at th bedside with the opportunity to ask questions as needed.  Escorted off unit sa

## 2017-08-30 NOTE — PLAN OF CARE
Problem: Patient/Family Goals  Goal: Patient/Family Long Term Goal  Patient's Long Term Goal: return home    Interventions:  - no complications  - See additional Care Plan goals for specific interventions   Outcome: Progressing

## 2017-08-30 NOTE — CONSULTS
Pt known to endocrine service and seen by endocrinology 5 days ago as an outpt. Glucoses are stable as an inpatient. He is going home today. Follow up per routine with Dr Jose Tolentino as an outpt. If he is to remain in the hospital, will see him and follow.

## 2017-08-30 NOTE — DIETARY NOTE
Nutrition Short Note   Dietitian consult received per cardiac rehab standing order. Pt to be educated by cardiac rehab staff and encouraged to attend outpatient classes taught by RD. RD available PRN.      Karena Ha RD, LDN  Pager [phone]

## 2017-08-30 NOTE — PLAN OF CARE
Problem: Patient/Family Goals  Goal: Patient/Family Short Term Goal  Patient's Short Term Goal: right groin site without bleeding    Interventions:   -monitor closely for bleeding  - See additional Care Plan goals for specific interventions   Outcome: Prog

## 2017-08-30 NOTE — PAYOR COMM NOTE
--------------  ADMISSION REVIEW     Payor: Shlomo Deluna  #:  [id number]  Authorization Number: NONE REQUIRED     Admit date: 8/29/17  Admit time: 26       Admitting Physician: Lillian Mata MD  Attending Physician:  Dewayne Jordan Demi Harris RN      0.9%  NaCl infusion     Date Action Dose Route User    8/29/2017 7190 New Bag (none) Intravenous Mandeep Miranda RN      0.9%  NaCl infusion     Date Action Dose Route User    8/29/2017 1114 New Bag (none) Intravenous Clyde Fleischer

## 2017-08-30 NOTE — RESPIRATORY THERAPY NOTE
RHETT Equipment Usage Summary :            Set Mode : CPAP W FLEX          Usage in Hours:6;40          90% Pressure (EPAP) : 13           90% Insp Pressure (IPAP);           AHI : 0.8          Supplemental Oxygen :      LPM

## 2017-08-30 NOTE — PROGRESS NOTES
[facility] [address] [place] [address] Cardiology Progress Note        Mya Rolling Patient Status:  Observation    1960 MRN [id number]   [facility] 2NE-A Attending Emeli Roman Day # 0 PCP Radha Li MD     [place] Courser carotids 2+ no bruits. Cardiac: Regular S1S2. No S3, S4, rub, click. No murmur. Lungs: Clear to auscultation and percussion. Abdomen: Soft, non-tender. Extremities: No LE edema. No clubbing or cyanosis.   No hematoma RCA site  Neurologic: Alert and

## 2017-09-18 PROCEDURE — 86803 HEPATITIS C AB TEST: CPT | Performed by: FAMILY MEDICINE

## 2017-09-18 PROCEDURE — 36415 COLL VENOUS BLD VENIPUNCTURE: CPT | Performed by: FAMILY MEDICINE

## 2017-09-21 ENCOUNTER — CARDPULM VISIT (OUTPATIENT)
Dept: CARDIAC REHAB | Facility: HOSPITAL | Age: 57
End: 2017-09-21
Attending: INTERNAL MEDICINE
Payer: COMMERCIAL

## 2017-09-21 VITALS
RESPIRATION RATE: 20 BRPM | BODY MASS INDEX: 34.81 KG/M2 | DIASTOLIC BLOOD PRESSURE: 72 MMHG | SYSTOLIC BLOOD PRESSURE: 126 MMHG | OXYGEN SATURATION: 98 % | WEIGHT: 257 LBS | HEART RATE: 80 BPM | HEIGHT: 72 IN

## 2017-09-21 PROCEDURE — 93798 PHYS/QHP OP CAR RHAB W/ECG: CPT

## 2017-09-22 ENCOUNTER — CARDPULM VISIT (OUTPATIENT)
Dept: CARDIAC REHAB | Facility: HOSPITAL | Age: 57
End: 2017-09-22
Attending: INTERNAL MEDICINE
Payer: COMMERCIAL

## 2017-09-22 PROCEDURE — 93798 PHYS/QHP OP CAR RHAB W/ECG: CPT

## 2017-09-25 ENCOUNTER — CARDPULM VISIT (OUTPATIENT)
Dept: CARDIAC REHAB | Facility: HOSPITAL | Age: 57
End: 2017-09-25
Attending: INTERNAL MEDICINE
Payer: COMMERCIAL

## 2017-09-25 PROCEDURE — 93798 PHYS/QHP OP CAR RHAB W/ECG: CPT

## 2017-09-27 ENCOUNTER — TELEPHONE (OUTPATIENT)
Dept: NEUROLOGY | Facility: CLINIC | Age: 57
End: 2017-09-27

## 2017-09-27 ENCOUNTER — CARDPULM VISIT (OUTPATIENT)
Dept: CARDIAC REHAB | Facility: HOSPITAL | Age: 57
End: 2017-09-27
Attending: INTERNAL MEDICINE
Payer: COMMERCIAL

## 2017-09-27 PROCEDURE — 93798 PHYS/QHP OP CAR RHAB W/ECG: CPT

## 2017-09-27 NOTE — TELEPHONE ENCOUNTER
Pt wanted to schedule f/up visit with Dr. Prasad Page in [facility]. Due to his transitioning out of that office, there were no opening were available. Offered to schedule pt in [place] or with Dr. Nhan Sousa.   He is going to contact his pcp, because he h

## 2017-09-29 ENCOUNTER — CARDPULM VISIT (OUTPATIENT)
Dept: CARDIAC REHAB | Facility: HOSPITAL | Age: 57
End: 2017-09-29
Attending: INTERNAL MEDICINE
Payer: COMMERCIAL

## 2017-09-29 PROCEDURE — 93798 PHYS/QHP OP CAR RHAB W/ECG: CPT

## 2017-10-02 ENCOUNTER — CARDPULM VISIT (OUTPATIENT)
Dept: CARDIAC REHAB | Facility: HOSPITAL | Age: 57
End: 2017-10-02
Attending: INTERNAL MEDICINE
Payer: COMMERCIAL

## 2017-10-02 PROCEDURE — 93798 PHYS/QHP OP CAR RHAB W/ECG: CPT

## 2017-10-04 ENCOUNTER — CARDPULM VISIT (OUTPATIENT)
Dept: CARDIAC REHAB | Facility: HOSPITAL | Age: 57
End: 2017-10-04
Attending: INTERNAL MEDICINE
Payer: COMMERCIAL

## 2017-10-04 PROCEDURE — 93798 PHYS/QHP OP CAR RHAB W/ECG: CPT

## 2017-10-06 ENCOUNTER — CARDPULM VISIT (OUTPATIENT)
Dept: CARDIAC REHAB | Facility: HOSPITAL | Age: 57
End: 2017-10-06
Attending: INTERNAL MEDICINE
Payer: COMMERCIAL

## 2017-10-06 PROCEDURE — 93798 PHYS/QHP OP CAR RHAB W/ECG: CPT

## 2017-10-09 ENCOUNTER — CARDPULM VISIT (OUTPATIENT)
Dept: CARDIAC REHAB | Facility: HOSPITAL | Age: 57
End: 2017-10-09
Attending: INTERNAL MEDICINE
Payer: COMMERCIAL

## 2017-10-09 PROCEDURE — 93798 PHYS/QHP OP CAR RHAB W/ECG: CPT

## 2017-10-10 DIAGNOSIS — E11.65 TYPE 2 DIABETES, UNCONTROLLED, WITH NEUROPATHY (HCC): ICD-10-CM

## 2017-10-10 DIAGNOSIS — R20.2 PARESTHESIAS: ICD-10-CM

## 2017-10-10 DIAGNOSIS — E11.40 TYPE 2 DIABETES, UNCONTROLLED, WITH NEUROPATHY (HCC): ICD-10-CM

## 2017-10-10 RX ORDER — GABAPENTIN 300 MG/1
CAPSULE ORAL
Qty: 540 CAPSULE | Refills: 0 | Status: SHIPPED | OUTPATIENT
Start: 2017-10-10 | End: 2018-03-01

## 2017-10-10 NOTE — TELEPHONE ENCOUNTER
Tasia Avilez          9/27/17 9:05 AM   Note      Pt wanted to schedule f/up visit with Dr. Kaye Lange in [place]. Due to his transitioning out of that office, there were no opening were available.   Offered to schedule pt in [place] or with Dr. Paula Mckeon

## 2017-10-10 NOTE — TELEPHONE ENCOUNTER
Medication: Gabapentin 300 mg    Date of last refill: 04/19/17 with 1 addt refill # 540  [place]     Last office visit: 4/19/2017  Due back to clinic per last office note:  RTN in 3 months  Date next office visit schedul REHAB ROOM [address]   11/29/2017 6:45 AM [facility] CARDIAC REHAB ROOM [address]   12/1/2017 6:45 AM [facility] CARDIAC REHAB ROOM [address]   12/4/2017 6:45 AM [facility] CARDIAC REHAB ROOM [address]   12/6/2017 6:45 A disease.     Neurologic examination is overall stable, with mildly impaired gait, continued evidence of peripheral neuropathy. He has noted minimal improvement in his symptoms with gabapentin 300 mg 3 times daily.   Patient continues to have symptoms, main

## 2017-10-11 ENCOUNTER — APPOINTMENT (OUTPATIENT)
Dept: CARDIAC REHAB | Facility: HOSPITAL | Age: 57
End: 2017-10-11
Attending: INTERNAL MEDICINE
Payer: COMMERCIAL

## 2017-10-13 ENCOUNTER — CARDPULM VISIT (OUTPATIENT)
Dept: CARDIAC REHAB | Facility: HOSPITAL | Age: 57
End: 2017-10-13
Attending: INTERNAL MEDICINE
Payer: COMMERCIAL

## 2017-10-13 PROCEDURE — 93798 PHYS/QHP OP CAR RHAB W/ECG: CPT

## 2017-10-16 ENCOUNTER — CARDPULM VISIT (OUTPATIENT)
Dept: CARDIAC REHAB | Facility: HOSPITAL | Age: 57
End: 2017-10-16
Attending: INTERNAL MEDICINE
Payer: COMMERCIAL

## 2017-10-16 PROCEDURE — 93798 PHYS/QHP OP CAR RHAB W/ECG: CPT

## 2017-10-18 ENCOUNTER — CARDPULM VISIT (OUTPATIENT)
Dept: CARDIAC REHAB | Facility: HOSPITAL | Age: 57
End: 2017-10-18
Attending: INTERNAL MEDICINE
Payer: COMMERCIAL

## 2017-10-18 PROCEDURE — 93798 PHYS/QHP OP CAR RHAB W/ECG: CPT

## 2017-10-20 ENCOUNTER — APPOINTMENT (OUTPATIENT)
Dept: CARDIAC REHAB | Facility: HOSPITAL | Age: 57
End: 2017-10-20
Attending: INTERNAL MEDICINE
Payer: COMMERCIAL

## 2017-10-23 ENCOUNTER — CARDPULM VISIT (OUTPATIENT)
Dept: CARDIAC REHAB | Facility: HOSPITAL | Age: 57
End: 2017-10-23
Attending: INTERNAL MEDICINE
Payer: COMMERCIAL

## 2017-10-23 PROCEDURE — 93798 PHYS/QHP OP CAR RHAB W/ECG: CPT

## 2017-10-25 ENCOUNTER — CARDPULM VISIT (OUTPATIENT)
Dept: CARDIAC REHAB | Facility: HOSPITAL | Age: 57
End: 2017-10-25
Attending: INTERNAL MEDICINE
Payer: COMMERCIAL

## 2017-10-25 PROCEDURE — 93798 PHYS/QHP OP CAR RHAB W/ECG: CPT

## 2017-11-02 PROCEDURE — 82570 ASSAY OF URINE CREATININE: CPT | Performed by: INTERNAL MEDICINE

## 2017-11-02 PROCEDURE — 84403 ASSAY OF TOTAL TESTOSTERONE: CPT | Performed by: UROLOGY

## 2017-11-02 PROCEDURE — 82043 UR ALBUMIN QUANTITATIVE: CPT | Performed by: INTERNAL MEDICINE

## 2018-03-01 ENCOUNTER — OFFICE VISIT (OUTPATIENT)
Dept: NEUROLOGY | Facility: CLINIC | Age: 58
End: 2018-03-01

## 2018-03-01 VITALS
DIASTOLIC BLOOD PRESSURE: 72 MMHG | HEART RATE: 76 BPM | BODY MASS INDEX: 35 KG/M2 | SYSTOLIC BLOOD PRESSURE: 132 MMHG | RESPIRATION RATE: 16 BRPM | WEIGHT: 256 LBS

## 2018-03-01 DIAGNOSIS — E11.65 TYPE 2 DIABETES, UNCONTROLLED, WITH NEUROPATHY (HCC): Primary | ICD-10-CM

## 2018-03-01 DIAGNOSIS — IMO0002 TYPE 2 DIABETES, UNCONTROLLED, WITH NEUROPATHY: ICD-10-CM

## 2018-03-01 DIAGNOSIS — R20.2 PARESTHESIAS: ICD-10-CM

## 2018-03-01 DIAGNOSIS — E11.40 TYPE 2 DIABETES, UNCONTROLLED, WITH NEUROPATHY (HCC): Primary | ICD-10-CM

## 2018-03-01 PROCEDURE — 99214 OFFICE O/P EST MOD 30 MIN: CPT | Performed by: OTHER

## 2018-03-01 RX ORDER — GABAPENTIN 300 MG/1
CAPSULE ORAL
Qty: 540 CAPSULE | Refills: 0 | Status: CANCELLED | OUTPATIENT
Start: 2018-03-01

## 2018-03-01 RX ORDER — GABAPENTIN 300 MG/1
CAPSULE ORAL
Qty: 540 CAPSULE | Refills: 1 | Status: SHIPPED | OUTPATIENT
Start: 2018-03-01 | End: 2018-10-07

## 2018-03-01 NOTE — PATIENT INSTRUCTIONS
Please follow up in 6 months   Refill policies:    • Allow 2-3 business days for refills; controlled substances may take longer.   • Contact your pharmacy at least 5 days prior to running out of medication and have them send an electronic request or submit your physician has recommended that you have a procedure or additional testing performed. [facility] will contact your insurance carrier to obtain pre-certification or prior authorization.     Unfortunately, YARY has seen an increas

## 2018-03-01 NOTE — PROGRESS NOTES
[facility] Progress Note    HPI    Follow-up: Neuropathy, dizziness    As per my initial note, 12/31/15:  Osbaldo Randall is a 62year old man, with past medical history significant for multiple medical problems, including hypertension, and had MRI brain which was normal. CUS negative. Since this time, no further episodes - no issues with focal numbness/tingling/weakness; has light headed sensation when standing up too quickly.   He did 30 day event monitor which showed intermittent in 2010 for renal cancer    • TIA (transient ischemic attack)    • Type 2 diabetes mellitus with vascular disease ([facility] [address] 3/9/2016    cerebral    • Type II or unspecified type diabetes mellitus without mention of complication, not stated as uncontrolled Dx a normal: UPPER GI ENDOSCOPY,BIOPSY      Comment: HP Rx 8/13. HP negative 4/7 8/23/2013: UPPER GI ENDOSCOPY,BIOPSY      Comment: Procedure: ESOPHAGOGASTRODUODENOSCOPY,                POSSIBLE BIOPSY, POSSIBLE POLYPECTOMY 06931;                 Surgeon:  Archie Mccann Comment:12,000-20,000 steps daily        No Known Allergies      Current Outpatient Prescriptions:   •  ERGOCALCIFEROL 59855 units Oral Cap, TAKE ONE CAPSULE BY MOUTH TWICE A WEEK, Disp: 24 capsule, Rfl: 1  •  OMEGA-3-ACID ETHYL ESTERS 1 g Oral Cap, TAKE 2 Aerosol, Inhale 2 puffs into the lungs 2 (two) times daily as needed.   , Disp: , Rfl:   •  Albuterol Sulfate HFA (PROAIR HFA) 108 (90 Base) MCG/ACT Inhalation Aero Soln, Inhale 2 puffs into the lungs every 4 (four) hours as needed for Wheezing or Shortness DTR: 1+ symmetric throughout UE; 2+ patellar bilaterally, absent ankle jerks; toes downgoing bilaterally, no clonus  Sensory: intact to light touch throughout; absent vibration at great toes; present at malleoli ~6 seconds; reduced to pin up to calf on R mIU/mL 0.935     11/23/15:  Hemoglobin A1C: 10.4  Lipids: Tri, Total Chol 213; direct     Imaging:  None new since last visit:     Prior as noted below:    MRI brain with and without contrast; (16);    CONCLUSION:     Unremarkable MRI of t insertional activity consisting of positive sharp   waves and fibrillation potentials was noted in the right EHL and   right gastroc (med) muscles.   2. Motor unit potentials demonstrated increased amplitude,   duration, and reduce recruitment in all muscle done and unremarkable, folate, B12, RPR, sed rate, CRP, NOEMI, TSH and monoclonal protein study normal aside from minimally elevated sed rate and elevated kappa and lamda light chains but normal ratio, which is likely related to chronic renal disease.     Russ

## 2018-05-17 PROBLEM — N17.9 ACUTE KIDNEY INJURY (HCC): Status: RESOLVED | Noted: 2017-03-15 | Resolved: 2018-05-17

## 2018-05-17 PROBLEM — R07.9 ACUTE CHEST PAIN: Status: RESOLVED | Noted: 2017-03-15 | Resolved: 2018-05-17

## 2018-05-29 ENCOUNTER — HOSPITAL ENCOUNTER (OUTPATIENT)
Age: 58
Discharge: ANOTHER HOSPITAL | End: 2018-05-29
Attending: EMERGENCY MEDICINE
Payer: COMMERCIAL

## 2018-05-29 ENCOUNTER — APPOINTMENT (OUTPATIENT)
Dept: GENERAL RADIOLOGY | Facility: HOSPITAL | Age: 58
End: 2018-05-29
Attending: EMERGENCY MEDICINE
Payer: COMMERCIAL

## 2018-05-29 ENCOUNTER — HOSPITAL ENCOUNTER (OUTPATIENT)
Facility: HOSPITAL | Age: 58
Setting detail: OBSERVATION
Discharge: HOME | End: 2018-05-30
Attending: EMERGENCY MEDICINE | Admitting: HOSPITALIST
Payer: COMMERCIAL

## 2018-05-29 VITALS
HEIGHT: 72 IN | RESPIRATION RATE: 20 BRPM | SYSTOLIC BLOOD PRESSURE: 180 MMHG | HEART RATE: 94 BPM | WEIGHT: 255 LBS | TEMPERATURE: 99 F | OXYGEN SATURATION: 95 % | BODY MASS INDEX: 34.54 KG/M2 | DIASTOLIC BLOOD PRESSURE: 83 MMHG

## 2018-05-29 DIAGNOSIS — R07.9 ACUTE CHEST PAIN: Primary | ICD-10-CM

## 2018-05-29 PROCEDURE — 84450 TRANSFERASE (AST) (SGOT): CPT | Performed by: EMERGENCY MEDICINE

## 2018-05-29 PROCEDURE — 84484 ASSAY OF TROPONIN QUANT: CPT | Performed by: HOSPITALIST

## 2018-05-29 PROCEDURE — 36415 COLL VENOUS BLD VENIPUNCTURE: CPT

## 2018-05-29 PROCEDURE — 84484 ASSAY OF TROPONIN QUANT: CPT | Performed by: EMERGENCY MEDICINE

## 2018-05-29 PROCEDURE — 99214 OFFICE O/P EST MOD 30 MIN: CPT

## 2018-05-29 PROCEDURE — 80053 COMPREHEN METABOLIC PANEL: CPT | Performed by: EMERGENCY MEDICINE

## 2018-05-29 PROCEDURE — 93010 ELECTROCARDIOGRAM REPORT: CPT

## 2018-05-29 PROCEDURE — 99285 EMERGENCY DEPT VISIT HI MDM: CPT

## 2018-05-29 PROCEDURE — 94660 CPAP INITIATION&MGMT: CPT

## 2018-05-29 PROCEDURE — 93005 ELECTROCARDIOGRAM TRACING: CPT

## 2018-05-29 PROCEDURE — 85025 COMPLETE CBC W/AUTO DIFF WBC: CPT | Performed by: EMERGENCY MEDICINE

## 2018-05-29 PROCEDURE — 71045 X-RAY EXAM CHEST 1 VIEW: CPT | Performed by: EMERGENCY MEDICINE

## 2018-05-29 PROCEDURE — 85730 THROMBOPLASTIN TIME PARTIAL: CPT | Performed by: EMERGENCY MEDICINE

## 2018-05-29 PROCEDURE — 80061 LIPID PANEL: CPT | Performed by: EMERGENCY MEDICINE

## 2018-05-29 PROCEDURE — 82962 GLUCOSE BLOOD TEST: CPT

## 2018-05-29 PROCEDURE — 85610 PROTHROMBIN TIME: CPT | Performed by: EMERGENCY MEDICINE

## 2018-05-29 PROCEDURE — 84132 ASSAY OF SERUM POTASSIUM: CPT | Performed by: EMERGENCY MEDICINE

## 2018-05-29 PROCEDURE — 83721 ASSAY OF BLOOD LIPOPROTEIN: CPT | Performed by: EMERGENCY MEDICINE

## 2018-05-29 RX ORDER — HYDROCODONE BITARTRATE AND ACETAMINOPHEN 5; 325 MG/1; MG/1
1 TABLET ORAL EVERY 4 HOURS PRN
Status: DISCONTINUED | OUTPATIENT
Start: 2018-05-29 | End: 2018-05-30

## 2018-05-29 RX ORDER — DEXTROSE MONOHYDRATE 25 G/50ML
50 INJECTION, SOLUTION INTRAVENOUS
Status: DISCONTINUED | OUTPATIENT
Start: 2018-05-29 | End: 2018-05-30

## 2018-05-29 RX ORDER — MELATONIN
325 EVERY OTHER DAY
Status: DISCONTINUED | OUTPATIENT
Start: 2018-05-30 | End: 2018-05-30

## 2018-05-29 RX ORDER — MONTELUKAST SODIUM 10 MG/1
10 TABLET ORAL EVERY MORNING
Status: DISCONTINUED | OUTPATIENT
Start: 2018-05-30 | End: 2018-05-30

## 2018-05-29 RX ORDER — ONDANSETRON 2 MG/ML
4 INJECTION INTRAMUSCULAR; INTRAVENOUS EVERY 4 HOURS PRN
Status: DISCONTINUED | OUTPATIENT
Start: 2018-05-29 | End: 2018-05-29

## 2018-05-29 RX ORDER — CLOPIDOGREL BISULFATE 75 MG/1
75 TABLET ORAL DAILY
Status: DISCONTINUED | OUTPATIENT
Start: 2018-05-30 | End: 2018-05-30

## 2018-05-29 RX ORDER — POLYETHYLENE GLYCOL 3350 17 G/17G
17 POWDER, FOR SOLUTION ORAL DAILY PRN
Status: DISCONTINUED | OUTPATIENT
Start: 2018-05-29 | End: 2018-05-30

## 2018-05-29 RX ORDER — ACETAMINOPHEN 325 MG/1
650 TABLET ORAL EVERY 4 HOURS PRN
Status: DISCONTINUED | OUTPATIENT
Start: 2018-05-29 | End: 2018-05-30

## 2018-05-29 RX ORDER — ATORVASTATIN CALCIUM 80 MG/1
80 TABLET, FILM COATED ORAL NIGHTLY
Status: DISCONTINUED | OUTPATIENT
Start: 2018-05-29 | End: 2018-05-30

## 2018-05-29 RX ORDER — ALFUZOSIN HYDROCHLORIDE 10 MG/1
10 TABLET, EXTENDED RELEASE ORAL NIGHTLY
Status: DISCONTINUED | OUTPATIENT
Start: 2018-05-29 | End: 2018-05-30

## 2018-05-29 RX ORDER — DOCUSATE SODIUM 100 MG/1
100 CAPSULE, LIQUID FILLED ORAL 2 TIMES DAILY
Status: DISCONTINUED | OUTPATIENT
Start: 2018-05-29 | End: 2018-05-30

## 2018-05-29 RX ORDER — ONDANSETRON 2 MG/ML
4 INJECTION INTRAMUSCULAR; INTRAVENOUS EVERY 6 HOURS PRN
Status: DISCONTINUED | OUTPATIENT
Start: 2018-05-29 | End: 2018-05-30

## 2018-05-29 RX ORDER — SODIUM PHOSPHATE, DIBASIC AND SODIUM PHOSPHATE, MONOBASIC 7; 19 G/133ML; G/133ML
1 ENEMA RECTAL ONCE AS NEEDED
Status: DISCONTINUED | OUTPATIENT
Start: 2018-05-29 | End: 2018-05-30

## 2018-05-29 RX ORDER — METOCLOPRAMIDE HYDROCHLORIDE 5 MG/ML
10 INJECTION INTRAMUSCULAR; INTRAVENOUS EVERY 8 HOURS PRN
Status: DISCONTINUED | OUTPATIENT
Start: 2018-05-29 | End: 2018-05-30

## 2018-05-29 RX ORDER — MONTELUKAST SODIUM 10 MG/1
10 TABLET ORAL NIGHTLY
Status: DISCONTINUED | OUTPATIENT
Start: 2018-05-29 | End: 2018-05-29

## 2018-05-29 RX ORDER — ASPIRIN 81 MG/1
81 TABLET, CHEWABLE ORAL 2 TIMES DAILY
Status: DISCONTINUED | OUTPATIENT
Start: 2018-05-30 | End: 2018-05-30

## 2018-05-29 RX ORDER — METOPROLOL SUCCINATE 100 MG/1
100 TABLET, EXTENDED RELEASE ORAL
Status: DISCONTINUED | OUTPATIENT
Start: 2018-05-30 | End: 2018-05-30

## 2018-05-29 RX ORDER — GABAPENTIN 600 MG/1
600 TABLET ORAL 3 TIMES DAILY
Status: DISCONTINUED | OUTPATIENT
Start: 2018-05-29 | End: 2018-05-30

## 2018-05-29 RX ORDER — AMITRIPTYLINE HYDROCHLORIDE 25 MG/1
25 TABLET, FILM COATED ORAL NIGHTLY
Status: DISCONTINUED | OUTPATIENT
Start: 2018-05-29 | End: 2018-05-30

## 2018-05-29 RX ORDER — BISACODYL 10 MG
10 SUPPOSITORY, RECTAL RECTAL
Status: DISCONTINUED | OUTPATIENT
Start: 2018-05-29 | End: 2018-05-30

## 2018-05-29 RX ORDER — LOSARTAN POTASSIUM 50 MG/1
50 TABLET ORAL DAILY
Status: DISCONTINUED | OUTPATIENT
Start: 2018-05-30 | End: 2018-05-30

## 2018-05-29 RX ORDER — ASPIRIN 81 MG/1
324 TABLET, CHEWABLE ORAL ONCE
Status: COMPLETED | OUTPATIENT
Start: 2018-05-29 | End: 2018-05-29

## 2018-05-29 RX ORDER — HYDROCODONE BITARTRATE AND ACETAMINOPHEN 5; 325 MG/1; MG/1
2 TABLET ORAL EVERY 4 HOURS PRN
Status: DISCONTINUED | OUTPATIENT
Start: 2018-05-29 | End: 2018-05-30

## 2018-05-29 RX ORDER — HEPARIN SODIUM 5000 [USP'U]/ML
5000 INJECTION, SOLUTION INTRAVENOUS; SUBCUTANEOUS EVERY 8 HOURS SCHEDULED
Status: DISCONTINUED | OUTPATIENT
Start: 2018-05-29 | End: 2018-05-30

## 2018-05-29 NOTE — ED PROVIDER NOTES
Patient Seen in: [facility] Emergency Department    History   Patient presents with:  Chest Pain Angina (cardiovascular)    Stated Complaint: chest pain from [facility]    HPI    Patient is pleasant 51-year-old male, presenting for evaluation of chest jamie Dx in 4/2013   • Psychiatric disorder    • Renal cancer ([facility] [address] 12/27/2012    Right nephrectomy in 2010    • RENAL DISEASE    • Renal disorder    • S/p nephrectomy 12/27/2012    Right nephrectomy in 2010 for renal cancer    • TIA (transient ischemic attac Excision, Neck  4/24/17: OTHER SURGICAL HISTORY      Comment: Cystoscopy/ [facility]- Dr. Brandon Girard   10/30/2017: OTHER SURGICAL HISTORY      Comment: flow us dr Elvin Ramirez  8/23/13= Gastritis.   H pylori positive, SB Bx normal: UPPER GI ENDOSCOPY,BIOPSY      Comment: HP Rx warm and dry. There are no rashes. EXTREMITIES: The patient moves all 4 extremities freely. No cyanosis, clubbing, or edema. NEUROLOGIC: The patient is awake, alert, and oriented x3. Cranial nerves are grossly intact.  There is no gross motor or sensor Portable  (cpt=71045)    Result Date: 5/29/2018  PROCEDURE:  XR CHEST AP PORTABLE  (CPT=71045)  TECHNIQUE:  AP chest radiograph was obtained. COMPARISON:  None.   INDICATIONS:  chest pain from [facility]  PATIENT STATED HISTORY: (As transcribed by Technologist) 3/1/2018          ICD-10-CM Noted POA    Acute chest pain R07.9 5/29/2018 Unknown

## 2018-05-29 NOTE — ED PROVIDER NOTES
Patient Seen in: THE MEDICAL CENTER [facility] In [facility]    History   Patient presents with:  Chest Pain    Stated Complaint: chest pain on sunday and again today at 2 pm    HPI    Patient had an episode of chest pain on Sunday.   He was sitting and eating w pressure    • High cholesterol    • History of blood transfusion    • HYPERTRIGLYCERIDEMIA     TG levels as high as 2000 range   • Near syncope 9/20/2015   • Obesity, unspecified    • Osteoarthrosis, unspecified whether generalized or localized, unspecifie DRUG ELUTING STENT  No date: CATH PERCUTANEOUS  TRANSLUMINAL CORONARY ANGIO*  9/26/12: COLONOSCOPY      Comment: Tortuous colon, incomplete. BE 8 mm polyp.     4/17/17= Hemorrhoids, Tortuous: COLONOSCOPY      Comment: Repeat 2022, needs MAC  in 1983: HERNI 20   Ht 182.9 cm (6')   Wt 115.7 kg   SpO2 95%   BMI 34.58 kg/m²         Physical Exam  General: The patient is awake, alert, conversant. Patient answers questions quickly and appropriately.   Eyes: sclera white, conjunctiva pink and moist.  Lids and lashe

## 2018-05-29 NOTE — ED INITIAL ASSESSMENT (HPI)
Patient states Sunday- 6:45 pm had a jolt to left center chest that lasted a few minutes  Patient states went away  Today- same feeling occurred at 2 pm- patient was having a BM and had a tight jolt to his chest  Stood up and felt lightheaded.   Patient sta

## 2018-05-30 ENCOUNTER — APPOINTMENT (OUTPATIENT)
Dept: CV DIAGNOSTICS | Facility: HOSPITAL | Age: 58
End: 2018-05-30
Attending: INTERNAL MEDICINE
Payer: COMMERCIAL

## 2018-05-30 VITALS
TEMPERATURE: 99 F | BODY MASS INDEX: 33.72 KG/M2 | HEART RATE: 91 BPM | RESPIRATION RATE: 18 BRPM | WEIGHT: 254.44 LBS | SYSTOLIC BLOOD PRESSURE: 144 MMHG | HEIGHT: 73 IN | DIASTOLIC BLOOD PRESSURE: 82 MMHG | OXYGEN SATURATION: 99 %

## 2018-05-30 PROCEDURE — 83036 HEMOGLOBIN GLYCOSYLATED A1C: CPT | Performed by: PHYSICIAN ASSISTANT

## 2018-05-30 PROCEDURE — 82962 GLUCOSE BLOOD TEST: CPT

## 2018-05-30 PROCEDURE — 80048 BASIC METABOLIC PNL TOTAL CA: CPT | Performed by: HOSPITALIST

## 2018-05-30 PROCEDURE — 96372 THER/PROPH/DIAG INJ SC/IM: CPT

## 2018-05-30 PROCEDURE — 93018 CV STRESS TEST I&R ONLY: CPT | Performed by: INTERNAL MEDICINE

## 2018-05-30 PROCEDURE — 93017 CV STRESS TEST TRACING ONLY: CPT | Performed by: INTERNAL MEDICINE

## 2018-05-30 PROCEDURE — 85025 COMPLETE CBC W/AUTO DIFF WBC: CPT | Performed by: HOSPITALIST

## 2018-05-30 PROCEDURE — 78452 HT MUSCLE IMAGE SPECT MULT: CPT | Performed by: INTERNAL MEDICINE

## 2018-05-30 PROCEDURE — 84484 ASSAY OF TROPONIN QUANT: CPT | Performed by: HOSPITALIST

## 2018-05-30 PROCEDURE — 94660 CPAP INITIATION&MGMT: CPT

## 2018-05-30 RX ORDER — ALBUTEROL SULFATE 90 UG/1
2 AEROSOL, METERED RESPIRATORY (INHALATION) EVERY 4 HOURS PRN
Status: DISCONTINUED | OUTPATIENT
Start: 2018-05-30 | End: 2018-05-30

## 2018-05-30 RX ORDER — DOCUSATE SODIUM 100 MG/1
100 CAPSULE, LIQUID FILLED ORAL 2 TIMES DAILY PRN
Status: DISCONTINUED | OUTPATIENT
Start: 2018-05-30 | End: 2018-05-30

## 2018-05-30 RX ORDER — FENOFIBRATE 134 MG/1
134 CAPSULE ORAL
Status: DISCONTINUED | OUTPATIENT
Start: 2018-05-31 | End: 2018-05-30

## 2018-05-30 NOTE — PROGRESS NOTES
05/29/18 2051 05/29/18 2052 05/29/18 2054   Vital Signs   /65 137/66 152/77   BP Location Right arm Right arm Right arm   BP Method Automatic Automatic Automatic   Patient Position Lying Sitting Standing   negative orthostats

## 2018-05-30 NOTE — H&P
[facility] Patient Status:  Observation    1960 MRN [id number]   [facility] 8NE-A Attending Kurtis Ang MD   Hosp Day # 0 PCP Donita Blizzard, MD     Cc: chest pain    History of Present Illn musculoskeletal disorders(V13.59) 1969    rt foot   • Pneumonia due to organism    • Primary male hypogonadism 4/16/2013    Dx in 4/2013   • Psychiatric disorder    • Renal cancer ([facility] [address] 12/27/2012    Right nephrectomy in 2010    • RENAL DISEASE    • Renal nephrectomy  No date: LAPAROSCOPY, SURGICAL; NEPHRECTOMY  At age 45s: OTHER SURGICAL HISTORY      Comment: Lipoma Excision, Neck  4/24/17: OTHER SURGICAL HISTORY      Comment: Cystoscopy/ Rhae Neil- Dr. Alba De La Fuente   10/30/2017: OTHER SURGICAL HISTORY      Comment: f Potassium 50 MG Oral Tab Take 1 tablet (50 mg total) by mouth daily. Disp: 90 tablet Rfl: 0 5/29/2018 at am   gabapentin 300 MG Oral Cap TAKE 2 CAPSULES BY MOUTH 3 TIMES DAILY.  Disp: 540 capsule Rfl: 1 5/29/2018 at am   OMEGA-3-ACID ETHYL ESTERS 1 g Oral C Unknown time   Multiple Vitamin (TAB-A-CRISTO) Oral Tab Take 1 tablet by mouth 2 (two) times daily.    Disp:  Rfl:  5/29/2018 at am   NOVOLOG FLEXPEN 100 UNIT/ML Subcutaneous Solution Pen-injector INJECT 25-40 UNITS UNDER THE SKIN THREE TIMES DAILY WITH MEALS 3  HEENT: moist mucous membranes.  PERRL  Lungs: clear bilaterally without wheeze  Heart:  regular rate and rhythm, NSR on tele     Abdomen: soft, no grimace or guarding with palpation, intact bowel sounds  Extremities: no pedal edema  Neurologic: no focal presenting to [facility] for chest pain.        # Chest pain  - in patient with CAD, prior CABG and multiple PCIs  - admit with cardiology consult  - troponin negative x 3, CXR clear  - stress testing per cardiology    # T2DM with associated retinopath extremities    Labs/imaging: reviewed    A/P:    CP  -Troponin x 3 negative  -Cardiology following, appreciate  -Stress test ordered: if negative for ischemia, plan for discharge    Rest of plan per PA note above    Tamar Gloria MD

## 2018-05-30 NOTE — CONSULTS
[facility] Cardiology Consultation    Sindhu Jiménez Patient Status:  Observation    1960 MRN [id number]   [facility] 8NE-A Attending Ana Faria MD   Hosp Day # 0 PCP Rachel Downs MD     Reason for Consultation:  Chest pain      History Primary male hypogonadism 4/16/2013    Dx in 4/2013   • Psychiatric disorder    • Renal cancer ([facility] [address] 12/27/2012    Right nephrectomy in 2010    • RENAL DISEASE    • Renal disorder    • S/p nephrectomy 12/27/2012    Right nephrectomy in 2010 for renal cance HISTORY      Comment: Lipoma Excision, Neck  4/24/17: OTHER SURGICAL HISTORY      Comment: Cystoscopy/ Nash Varela- Dr. Yeimi Cardona   10/30/2017: OTHER SURGICAL HISTORY      Comment: flow us dr Fiona Chen  8/23/13= Gastritis.   H pylori positive, SB Bx normal: UPPER GI ENDOSCOP • Heparin Sodium (Porcine)  5,000 Units Subcutaneous [facility]   • docusate sodium  100 mg Oral BID   • insulin detemir  35 Units Subcutaneous Dave[facility]   • Insulin Aspart Pen  4-20 Units Subcutaneous TID CC and HS   • Montelukast Sodium  10 mg Oral QA K   --   4.7  4.0   CL  109   --   108   CO2  21.0*   --   26.0   BUN  43*   --   37*   CREATSERUM  2.04*   --   1.88*   ALT  35   --    --    AST   --   37   --    ALB  3.3*   --    --        Recent Labs   Lab  05/29/18   1820   INR  1.00

## 2018-05-30 NOTE — PAYOR COMM NOTE
--------------  ADMISSION REVIEW     Payor: Shlomo Deluna  #:  [id number]  Authorization Number: N/A    Admit date: N/A  Admit time: N/A       Admitting Physician: Monie Davis MD  Attending Physician:  Vanessa Castillo MD  Primary Care Ph components within normal limits   CBC W/ DIFFERENTIAL - Abnormal; Notable for the following:     HGB 12.7 (*)     MCH 26.6 (*)     All other components within normal limits   TROPONIN I - Normal   PROTHROMBIN TIME (PT) - Normal   CBC WITH DIFFERENTIAL WITH FRANCISCO JAVIER RN      docusate sodium (COLACE) cap 100 mg     Date Action Dose Route User    5/30/2018 1016 Given 100 mg Oral Arlet Dawson RN      ferrous sulfate EC tab 325 mg     Date Action Dose Route User    5/30/2018 1052 Given 325 mg Oral Simone Ingram RN

## 2018-05-30 NOTE — PROGRESS NOTES
Nuclear stress test done today:   EF 53%  Negative ischemia  Dilated left ventricle    Pt ok to go home per Dr. Rojas Mercado.

## 2018-05-30 NOTE — PLAN OF CARE
NURSING ADMISSION NOTE      Patient admitted via Cart  Oriented to room. Safety precautions initiated. Bed in low position. Call light in reach. Admission navigator completed.      Pt admitted with sharp chest pain, lasting a few min while having a

## 2018-05-30 NOTE — RESPIRATORY THERAPY NOTE
RHETT : EQUIPMENT USE: DAILY SUMMARY                                            SET MODE: CPAP WITH CFLEX                                          USAGE IN HOURS: 7:16                                          90% EXP

## 2018-05-30 NOTE — PROGRESS NOTES
CARDIAC DIAGNOSTICS PRELIMINARY REPORT    No ST changes noted before, during, or after exercise. Rest: heart rate was 91 BPM, blood pressure was 148/78 mmHg, EKG showed NSR    Patient exercised for 7:23 minutes on a Tarik protocol.  Patient achieved peak

## 2018-05-31 NOTE — PLAN OF CARE
NURSING DISCHARGE NOTE    Discharged Home via Ambulatory. Accompanied by Family member  Belongings Taken by patient/family . Discharge instructions given to patient. Follow up appointments and medications discussed with patient and wife.  Questions

## 2018-05-31 NOTE — PLAN OF CARE
Received stress test report from [facility] cardiology, that stress test is okay and that pt is okay to go home per cardio  Dr Marilyn Olivier notified and she wrote for DC order  Denies chest pain and dizziness  Ate good supper

## 2018-06-06 PROBLEM — R07.9 ACUTE CHEST PAIN: Status: RESOLVED | Noted: 2018-05-29 | Resolved: 2018-06-06

## 2018-06-06 PROBLEM — R73.9 HYPERGLYCEMIA: Status: RESOLVED | Noted: 2017-03-15 | Resolved: 2018-06-06

## 2018-06-06 PROBLEM — E66.09 CLASS 1 OBESITY DUE TO EXCESS CALORIES WITH SERIOUS COMORBIDITY AND BODY MASS INDEX (BMI) OF 34.0 TO 34.9 IN ADULT: Status: ACTIVE | Noted: 2018-06-06

## 2018-06-06 PROBLEM — R79.89 AZOTEMIA: Status: RESOLVED | Noted: 2017-03-15 | Resolved: 2018-06-06

## 2018-06-06 PROBLEM — I25.10 CORONARY ARTERIOSCLEROSIS: Status: RESOLVED | Noted: 2017-07-21 | Resolved: 2018-06-06

## 2018-06-06 PROBLEM — N17.9 AKI (ACUTE KIDNEY INJURY) (HCC): Status: RESOLVED | Noted: 2017-03-15 | Resolved: 2018-06-06

## 2018-10-02 ENCOUNTER — OFFICE VISIT (OUTPATIENT)
Dept: NEUROLOGY | Facility: CLINIC | Age: 58
End: 2018-10-02
Payer: COMMERCIAL

## 2018-10-02 VITALS
RESPIRATION RATE: 16 BRPM | DIASTOLIC BLOOD PRESSURE: 72 MMHG | HEART RATE: 76 BPM | BODY MASS INDEX: 33.8 KG/M2 | SYSTOLIC BLOOD PRESSURE: 130 MMHG | WEIGHT: 255 LBS | HEIGHT: 73 IN

## 2018-10-02 DIAGNOSIS — I73.9 CLAUDICATION OF BOTH LOWER EXTREMITIES (HCC): ICD-10-CM

## 2018-10-02 DIAGNOSIS — E11.65 TYPE 2 DIABETES, UNCONTROLLED, WITH NEUROPATHY (HCC): Primary | ICD-10-CM

## 2018-10-02 DIAGNOSIS — E11.40 TYPE 2 DIABETES, UNCONTROLLED, WITH NEUROPATHY (HCC): Primary | ICD-10-CM

## 2018-10-02 PROCEDURE — 99214 OFFICE O/P EST MOD 30 MIN: CPT | Performed by: OTHER

## 2018-10-02 NOTE — PATIENT INSTRUCTIONS
Please have MRI lumbar spine done  Continue gabapentin at 600 mg tid   Follow up after testing done     Refill policies:    • Allow 2-3 business days for refills; controlled substances may take longer.   • Contact your pharmacy at least 5 days prior to runn determine coverage. If test is done without insurance authorization, patient may be responsible for the entire amount billed. Precertification and Prior Authorizations:   If your physician has recommended that you have a procedure or additional testin

## 2018-10-02 NOTE — PROGRESS NOTES
[employer] Progress Note    HPI    Follow-up: Neuropathy, dizziness    As per my initial note, 12/31/15:  Doris Ríos is a 62year old man, with past medical history significant for multiple medical problems, including hypertension, Diagnosis Date   • Acute kidney injury ([facility] 3/15/2017   • DEEPA (acute kidney injury) ([facility] 3/15/2017   • Anemia    • Arrhythmia     Hx: A-fib   • Asthma    • Atherosclerosis of coronary artery    • ATRIAL FIBRILLATION    • Cancer ([facility]     right kidney R (CORONARY)      Comment:  EMILY-LAD  08/29/2017: ANGIOPLASTY (CORONARY)      Comment:  3 stents and plasty  08/08/2017: ANGIOPLASTY (CORONARY)      Comment:  stents  7/30/2010: APPENDECTOMY  10/2/13: BYPASS SURGERY      Comment:  Coronary revascularization x History   Problem Relation Age of Onset   • Heart Surgery Father    • Heart Disease Father    • Cancer Father         Esophageal cancer   • Heart Disorder Father         CABG at age 46s   • Diabetes Father         Type 2 DM   • Other (Other) Father    • Ca 1-2 cups of decaf. coffee weekly        Occupational Exposure: Not Asked        Hobby Hazards: Not Asked        Sleep Concern: Not Asked        Stress Concern: Not Asked        Weight Concern: Not Asked        Special Diet: Not Asked        Back Care: Not Fenofibrate 145 MG Oral Tab, Take 1 tablet (145 mg total) by mouth daily. , Disp: 90 tablet, Rfl: 0  •  Budesonide-Formoterol Fumarate (SYMBICORT) 160-4.5 MCG/ACT Inhalation Aerosol, Inhale 2 puffs into the lungs 2 (two) times daily as needed.   , Disp: , Rf detect; intact to pin over both hands  Coord: FNF intact with no tremor or dysmetria; rapid alternating movements intact bilaterally  Romberg: absent though sways   Gait: casual gait broad based - unable to tandem    Labs:    None New since last visit: without contrast (9/25/16):    CONCLUSION:     Normal MR angiography of the extracranial carotid and vertebral circulations. No regions of occlusion or significant stenosis noted. No aneurysmal dilatation identified.       MRI brain / MRA brain (11/23/15): the distal muscles of the upper extremity (FDI). Impression: This is an abnormal study.  There is electrophysiologic evidence   consistent with a severe, chronic mixed type, primarily axonal,   sensorimotor, length dependent polyneuropathy, with ongoing diabetes. His complaints currently with pain in both legs worse with walking, may suggest neurogenic claudication and will check MRI Lumbar spine to evaluate.      Otherwise, for treatment, continue gabapentin  600 mg 3 times daily; will continue

## 2018-10-07 DIAGNOSIS — E11.40 TYPE 2 DIABETES, UNCONTROLLED, WITH NEUROPATHY (HCC): ICD-10-CM

## 2018-10-07 DIAGNOSIS — E11.65 TYPE 2 DIABETES, UNCONTROLLED, WITH NEUROPATHY (HCC): ICD-10-CM

## 2018-10-07 DIAGNOSIS — R20.2 PARESTHESIAS: ICD-10-CM

## 2018-10-08 RX ORDER — GABAPENTIN 300 MG/1
CAPSULE ORAL
Qty: 540 CAPSULE | Refills: 1 | Status: SHIPPED | OUTPATIENT
Start: 2018-10-08 | End: 2019-03-19

## 2018-10-08 NOTE — TELEPHONE ENCOUNTER
Medication: Gabapentin 300 mg    Date of last refill: 03/01/18 with 1 addt refill # 540  [place]     Last office visit: 10/2/2018  Due back to clinic per last office note:  RTN in 3 months  Date next office visit schedul but normal ratio, which is likely related to chronic renal disease.     Neurologic examination is overall stable, with mildly impaired gait, and continued evidence of peripheral neuropathy, most likely due to chronic poorly controlled diabetes.        His

## 2018-10-10 ENCOUNTER — HOSPITAL ENCOUNTER (OUTPATIENT)
Dept: MRI IMAGING | Age: 58
Discharge: HOME | End: 2018-10-10
Attending: Other
Payer: COMMERCIAL

## 2018-10-10 DIAGNOSIS — I73.9 CLAUDICATION OF BOTH LOWER EXTREMITIES (HCC): ICD-10-CM

## 2018-10-10 PROCEDURE — 72148 MRI LUMBAR SPINE W/O DYE: CPT | Performed by: OTHER

## 2018-10-22 ENCOUNTER — TELEPHONE (OUTPATIENT)
Dept: NEUROLOGY | Facility: CLINIC | Age: 58
End: 2018-10-22

## 2018-10-22 DIAGNOSIS — I73.9 CLAUDICATION OF BOTH LOWER EXTREMITIES (HCC): ICD-10-CM

## 2018-10-22 DIAGNOSIS — M54.16 LUMBAR RADICULOPATHY: Primary | ICD-10-CM

## 2018-10-22 NOTE — TELEPHONE ENCOUNTER
Discussed results of MRI lumbar spine - no significant structural pathology noted - will refer to PT for treatment, - patient with R LE pain / tingling worse on standing

## 2018-10-26 ENCOUNTER — HOSPITAL ENCOUNTER (OUTPATIENT)
Dept: PHYSICAL THERAPY | Facility: HOSPITAL | Age: 58
Setting detail: THERAPIES SERIES
Discharge: HOME | End: 2018-10-26
Attending: Other
Payer: COMMERCIAL

## 2018-10-26 DIAGNOSIS — M54.16 LUMBAR RADICULOPATHY: ICD-10-CM

## 2018-10-26 DIAGNOSIS — I73.9 CLAUDICATION OF BOTH LOWER EXTREMITIES (HCC): ICD-10-CM

## 2018-10-26 PROCEDURE — 97163 PT EVAL HIGH COMPLEX 45 MIN: CPT

## 2018-10-26 NOTE — PROGRESS NOTES
SPINE EVALUATION:   Referring Physician: Dr. Florentino Rudolph  Diagnosis: Lumbar Radiculopathy, Claudication of Both Lower Extremities    Date of Service: 10/26/2018     PATIENT SUMMARY   Doris Ríos is a 62year old y/o male who presents to therapy today w after fainting at [facility]), weakness, numbness/tingling, dizziness, fainting episodes, difficulty walking, increased swelling in hands/legs, depression (controlled, the patient sees a counselor), falls, kidney cancer (right kidney removed 2010), heart dise support, short step length bilaterally, otherwise unremarkable. Neuro Screen: myotomal strength is intact/equal bilaterally.  The patient reports diminished light touch sensation distal to knees bilaterally    Lumbar ROM:   Flexion: 30 (painful in low back within 8 visits  2. The patient will demonstrate the ability to squat down an  a light object from the floor with proper body mechanics within 8 visits  3.  The patient will report little difficulty with performing household chores on FOTO assessment

## 2018-11-06 ENCOUNTER — APPOINTMENT (OUTPATIENT)
Dept: GENERAL RADIOLOGY | Facility: HOSPITAL | Age: 58
End: 2018-11-06
Payer: COMMERCIAL

## 2018-11-06 ENCOUNTER — HOSPITAL ENCOUNTER (EMERGENCY)
Facility: HOSPITAL | Age: 58
Discharge: HOME | End: 2018-11-07
Payer: COMMERCIAL

## 2018-11-06 ENCOUNTER — HOSPITAL ENCOUNTER (OUTPATIENT)
Dept: PHYSICAL THERAPY | Facility: HOSPITAL | Age: 58
Setting detail: THERAPIES SERIES
Discharge: HOME | End: 2018-11-06
Attending: Other
Payer: COMMERCIAL

## 2018-11-06 DIAGNOSIS — M25.561 ACUTE PAIN OF RIGHT KNEE: Primary | ICD-10-CM

## 2018-11-06 PROCEDURE — 97110 THERAPEUTIC EXERCISES: CPT

## 2018-11-06 PROCEDURE — 97140 MANUAL THERAPY 1/> REGIONS: CPT

## 2018-11-06 PROCEDURE — 73562 X-RAY EXAM OF KNEE 3: CPT

## 2018-11-06 PROCEDURE — 99284 EMERGENCY DEPT VISIT MOD MDM: CPT

## 2018-11-07 ENCOUNTER — APPOINTMENT (OUTPATIENT)
Dept: ULTRASOUND IMAGING | Facility: HOSPITAL | Age: 58
End: 2018-11-07
Payer: COMMERCIAL

## 2018-11-07 VITALS
BODY MASS INDEX: 33.8 KG/M2 | DIASTOLIC BLOOD PRESSURE: 69 MMHG | HEART RATE: 82 BPM | SYSTOLIC BLOOD PRESSURE: 142 MMHG | OXYGEN SATURATION: 96 % | TEMPERATURE: 98 F | HEIGHT: 73 IN | RESPIRATION RATE: 15 BRPM | WEIGHT: 255 LBS

## 2018-11-07 PROCEDURE — 93971 EXTREMITY STUDY: CPT

## 2018-11-07 RX ORDER — HYDROCODONE BITARTRATE AND ACETAMINOPHEN 5; 325 MG/1; MG/1
1-2 TABLET ORAL EVERY 4 HOURS PRN
Qty: 10 TABLET | Refills: 0 | Status: SHIPPED | OUTPATIENT
Start: 2018-11-07 | End: 2018-11-17

## 2018-11-07 RX ORDER — HYDROCODONE BITARTRATE AND ACETAMINOPHEN 5; 325 MG/1; MG/1
2 TABLET ORAL ONCE
Status: COMPLETED | OUTPATIENT
Start: 2018-11-07 | End: 2018-11-07

## 2018-11-07 NOTE — ED INITIAL ASSESSMENT (HPI)
PT states he has chronic pain in both legs due to bulging disc. PT in physical therapy and noticed tonight increased swelling in R knee and increase in back pain.

## 2018-11-07 NOTE — ED PROVIDER NOTES
Patient Seen in: [facility] Emergency Department    History   Patient presents with:  Swelling Edema (cardiovascular, metabolic)    Stated Complaint: R LEG EDEMA    HPI    This 49-year-old presents with right knee pain that radiates distally.   The r FIBRILLATION    • Cancer [facility])     right kidney RCC   • Cataract    • Coronary atherosclerosis    • Depressed    • Depression    • Diabetic neuropathy, type II diabetes mellitus ([facility] [address]    • Diabetic retinopathy     Sees Dr. Garry Tate   • Endocrine disorder    • SURGERY  10/2/13    Coronary revascularization x4, left internal mammary artery graft to the LAD, saphenous vein graft to the diagonal, second obtuse marginal, and acute marginal branch of the right.    • CABG  10/02/2013    LAD, Diagonal, 2nd OM, right mar noted in HPI. Constitutional and vital signs reviewed. All other systems reviewed and negative except as noted above.     Physical Exam     ED Triage Vitals [11/06/18 2306]   /81   Pulse 86   Resp 16   Temp 97.9 °F (36.6 °C)   Temp src Temporal his lower back. FINDINGS:  Joint spaces are normal.  No fracture, expansile lesion or erosion. No chondrocalcinosis. There is slight obliquity on the lateral view. No joint effusion is suggested. No abnormal soft tissue calcifications.       [facility] but nerve roots as described above at the L4-5 and L5-S1 levels. No high-grade spinal canal stenosis.     Dictated by: Eric Troy MD on 10/10/2018 at 13:16     Approved by: Eric Troy MD             Venous Doppler Leg Bilat - Vasc

## 2018-11-08 ENCOUNTER — APPOINTMENT (OUTPATIENT)
Dept: PHYSICAL THERAPY | Facility: HOSPITAL | Age: 58
End: 2018-11-08
Attending: Other
Payer: COMMERCIAL

## 2018-11-13 ENCOUNTER — HOSPITAL ENCOUNTER (OUTPATIENT)
Dept: PHYSICAL THERAPY | Facility: HOSPITAL | Age: 58
Setting detail: THERAPIES SERIES
Discharge: HOME | End: 2018-11-13
Attending: Other
Payer: COMMERCIAL

## 2018-11-13 PROCEDURE — 97110 THERAPEUTIC EXERCISES: CPT

## 2018-11-13 PROCEDURE — 97140 MANUAL THERAPY 1/> REGIONS: CPT

## 2018-11-13 NOTE — PROGRESS NOTES
Dx: Lumbar radiculopathy (M54.16)  Claudication of both lower extremities (HCC) (I73.9)         Authorized # of Visits:  60/yr         Next MD visit: none scheduled  Fall Risk: standard         Precautions: n/a             Subjective: Patient states that h and palpable tenderness over right lumbar paraspinals which improved with soft tissue mobilization. Added a posterior pelvic tilting exercise to his home exercise program as this exercise decreased his pain during today's session.  The patient requires cont

## 2018-11-16 ENCOUNTER — HOSPITAL ENCOUNTER (OUTPATIENT)
Dept: PHYSICAL THERAPY | Facility: HOSPITAL | Age: 58
Setting detail: THERAPIES SERIES
Discharge: HOME | End: 2018-11-16
Attending: Other
Payer: COMMERCIAL

## 2018-11-16 PROCEDURE — 97112 NEUROMUSCULAR REEDUCATION: CPT

## 2018-11-16 PROCEDURE — 97110 THERAPEUTIC EXERCISES: CPT

## 2018-11-16 NOTE — PROGRESS NOTES
Dx: Lumbar radiculopathy (M54.16)  Claudication of both lower extremities (HCC) (I73.9)         Authorized # of Visits:  60/yr         Next MD visit: none scheduled  Fall Risk: standard         Precautions: n/a             Subjective:  The patient states th hooklying swiss ball HS curls 15x to improve core activation       Prone hip flexor stretch 2x30\" B hooklying lower lumbar rotation 10x to improve flexibility hooklying march no resistance, 2x10 to improve core stability       Prone hip PROM IR/ER stretch

## 2018-11-20 ENCOUNTER — HOSPITAL ENCOUNTER (OUTPATIENT)
Dept: PHYSICAL THERAPY | Facility: HOSPITAL | Age: 58
Setting detail: THERAPIES SERIES
Discharge: HOME | End: 2018-11-20
Attending: Other
Payer: COMMERCIAL

## 2018-11-20 PROCEDURE — 97112 NEUROMUSCULAR REEDUCATION: CPT

## 2018-11-20 PROCEDURE — 97110 THERAPEUTIC EXERCISES: CPT

## 2018-11-20 NOTE — PROGRESS NOTES
Dx: Lumbar radiculopathy (M54.16)  Claudication of both lower extremities (HCC) (I73.9)         Authorized # of Visits:  60/yr         Next MD visit: none scheduled  Fall Risk: standard         Precautions: n/a             Subjective:  The patient reports t Ok for CXR PA and lateral for cough   musculature x15' Seated lumbar flexion stretching 6x to improve flexibility hooklying swiss ball HS curls 15x to improve core activation Lower lumbar rotation stretching to improve flexibility, 10x      Prone hip flexor stretch 2x30\" B hooklying lower lum Total Timed Treatment: 43 min     Total Treatment Time: 43 min

## 2018-11-23 ENCOUNTER — APPOINTMENT (OUTPATIENT)
Dept: PHYSICAL THERAPY | Facility: HOSPITAL | Age: 58
End: 2018-11-23
Attending: Other
Payer: COMMERCIAL

## 2018-11-23 ENCOUNTER — HOSPITAL ENCOUNTER (OUTPATIENT)
Dept: PHYSICAL THERAPY | Facility: HOSPITAL | Age: 58
Setting detail: THERAPIES SERIES
Discharge: HOME | End: 2018-11-23
Attending: Other
Payer: COMMERCIAL

## 2018-11-23 PROCEDURE — 97112 NEUROMUSCULAR REEDUCATION: CPT

## 2018-11-23 PROCEDURE — 97110 THERAPEUTIC EXERCISES: CPT

## 2018-11-23 NOTE — PROGRESS NOTES
Dx: Lumbar radiculopathy (M54.16)  Claudication of both lower extremities (HCC) (I73.9)         Authorized # of Visits:  60/yr         Next MD visit: none scheduled  Fall Risk: standard         Precautions: n/a             Subjective:  The patient states th Lower lumbar rotation stretching to improve flexibility, 10x Lower lumbar rotation stretching to improve flexibility, 10x     Prone hip flexor stretch 2x30\" B hooklying lower lumbar rotation 10x to improve flexibility hooklying march no resistance, 2x10 t Standing hip abduction with OPAL UE support, 2x10 ea X Seated HS curls green TB, 2x10 ea X     X X X Standing heel raises 2x12 to improve  X     HEP: lower lumbar rotation stretching, supine clamshell with green TB, posterior pelvic tilt    Charges:   There

## 2018-11-27 ENCOUNTER — HOSPITAL ENCOUNTER (OUTPATIENT)
Dept: PHYSICAL THERAPY | Facility: HOSPITAL | Age: 58
Setting detail: THERAPIES SERIES
Discharge: HOME | End: 2018-11-27
Attending: Other
Payer: COMMERCIAL

## 2018-11-27 PROCEDURE — 97112 NEUROMUSCULAR REEDUCATION: CPT

## 2018-11-27 PROCEDURE — 97110 THERAPEUTIC EXERCISES: CPT

## 2018-11-27 NOTE — PROGRESS NOTES
Dx: Lumbar radiculopathy (M54.16)  Claudication of both lower extremities (HCC) (I73.9)         Authorized # of Visits:  60/yr         Next MD visit: none scheduled  Fall Risk: standard         Precautions: n/a             Subjective:  The patient reports t improve flexibility, 10x Lower lumbar rotation stretching to improve flexibility, 10x Posterior pelvic tilt 20x to improve core activation    Prone hip flexor stretch 2x30\" B hooklying lower lumbar rotation 10x to improve flexibility hooklying march no re 2x10 to Improve functional LE strength Standing hip abduction no resistance, 2x10 ea Lateral stepping with theraband hold for core strength, 5x ea Lateral stepping in // bars red TB, 3x     Seated marching 2x10 ea to improve core activation X Standing part

## 2018-12-04 ENCOUNTER — HOSPITAL ENCOUNTER (OUTPATIENT)
Dept: PHYSICAL THERAPY | Facility: HOSPITAL | Age: 58
Setting detail: THERAPIES SERIES
Discharge: HOME | End: 2018-12-04
Attending: Other
Payer: COMMERCIAL

## 2018-12-04 PROCEDURE — 97112 NEUROMUSCULAR REEDUCATION: CPT

## 2018-12-04 PROCEDURE — 97110 THERAPEUTIC EXERCISES: CPT

## 2018-12-04 NOTE — PROGRESS NOTES
Dx: Lumbar radiculopathy (M54.16)  Claudication of both lower extremities (HCC) (I73.9)         Authorized # of Visits:  60/yr         Next MD visit: none scheduled  Fall Risk: standard         Precautions: n/a              Progress Summary    Pt has atten 5/5   Knee extension: R 5/5; L 5/5   DF: R 5/5; L 5/5     Flexibility:   LE   Hamstrings: R 45 deg; L 45 deg (SLR, stretching in HS--> no reproduction of leg pain)  Piriformis: R WNL; L WNL       Goals:   1.  The patient will report being able to ambulate f rotation stretching to improve flexibility, 10x Lower lumbar rotation stretching to improve flexibility, 10x Posterior pelvic tilt 20x to improve core activation Swiss ball hamstring curls in supine, 15x ea   Prone hip flexor stretch 2x30\" B hooklying low mobilizations grade II to reduce pain Seated hamstring curls with red TB, 2x10 ea Standing marching at // bars 2x10 ea to improve core activation Seated lumbar flexion/lateral flexion with swiss ball, 5x ea Standing 3 way hip 2x6 with balance pole support

## 2018-12-11 ENCOUNTER — HOSPITAL ENCOUNTER (OUTPATIENT)
Dept: PHYSICAL THERAPY | Facility: HOSPITAL | Age: 58
Setting detail: THERAPIES SERIES
Discharge: HOME | End: 2018-12-11
Attending: Other
Payer: COMMERCIAL

## 2018-12-11 PROCEDURE — 97110 THERAPEUTIC EXERCISES: CPT

## 2018-12-11 PROCEDURE — 97140 MANUAL THERAPY 1/> REGIONS: CPT

## 2018-12-11 PROCEDURE — 97112 NEUROMUSCULAR REEDUCATION: CPT

## 2018-12-11 NOTE — PROGRESS NOTES
Dx: Lumbar radiculopathy (M54.16)  Claudication of both lower extremities (HCC) (I73.9)         Authorized # of Visits:  60/yr         Next MD visit: none scheduled  Fall Risk: standard         Precautions: n/a            Subjective:  The patient states deniz visits MET 12/4/2018  3. The patient will report little difficulty with performing household chores on FOTO assessment within 8 visits PARTIALLY MET, the patient has no pain with cooking and vacuuming, but does have pain with laundry  4.  The patient will d green tube band   90/90 HSS 2x30\" B Supine clamshell green TB to improve lumbopelvic strength, 2x10 ea Seated swiss ball rolls to stretch into lumbar flexion, 10x, lateral flexion 10x Passive hamstring stretching to improve flexibility, 2x30 sec ea hookly STM to quadratus lumborum, gluteal musculature, lumbar paraspinals for pain relief    Seated marching 2x10 ea to improve core activation X Standing partial lunge on BOSU to improve leg strength, 10x ea X Standing in tandem stance, 2x30 sec ea to improve ut

## 2018-12-13 ENCOUNTER — HOSPITAL ENCOUNTER (OUTPATIENT)
Dept: PHYSICAL THERAPY | Facility: HOSPITAL | Age: 58
Setting detail: THERAPIES SERIES
Discharge: HOME | End: 2018-12-13
Attending: Other
Payer: COMMERCIAL

## 2018-12-13 PROCEDURE — 97112 NEUROMUSCULAR REEDUCATION: CPT

## 2018-12-13 PROCEDURE — 97110 THERAPEUTIC EXERCISES: CPT

## 2018-12-13 NOTE — PROGRESS NOTES
Dx: Lumbar radiculopathy (M54.16)  Claudication of both lower extremities (HCC) (I73.9)         Authorized # of Visits:  60/yr         Next MD visit: none scheduled  Fall Risk: standard         Precautions: n/a            Subjective:  The patient reports th pain with laundry  4. The patient will demonstrate hip flexor strength that is at least 4+/5 bilaterally within 8 visits MET 12/4/2018  5.  The patient will be independent and adherent in a comprehensive home exercise program within 8 visits PROGRESSING ball, 15x ea Standing 3 way hip 2x8 ea with no resistance to improve strength   Sit<>stand from elevated mat table, 2x8 Supine clamshell to improve hip strength, 2x10 ea 6\" step ups with high knee, 2x10 ea Sit<>stand 2x8 to improve functional strength Sea 3x30 sec ea   X Seated HS curls green TB, 2x10 ea X Seated swiss ball rolling forward and lateral to improve flexibility  Calf stretching on slant board, 2x30 sec Seated lateral trunk flexion stretching to improve flexibility 10x ea X   X Standing heel camejo

## 2018-12-16 ENCOUNTER — WALK IN (OUTPATIENT)
Dept: URGENT CARE | Age: 58
End: 2018-12-16

## 2018-12-16 VITALS
WEIGHT: 255 LBS | HEART RATE: 76 BPM | TEMPERATURE: 97.8 F | BODY MASS INDEX: 33.8 KG/M2 | DIASTOLIC BLOOD PRESSURE: 78 MMHG | HEIGHT: 73 IN | SYSTOLIC BLOOD PRESSURE: 130 MMHG | RESPIRATION RATE: 18 BRPM

## 2018-12-16 DIAGNOSIS — J01.90 ACUTE BACTERIAL SINUSITIS: Primary | ICD-10-CM

## 2018-12-16 DIAGNOSIS — J20.9 ACUTE BRONCHITIS, UNSPECIFIED ORGANISM: ICD-10-CM

## 2018-12-16 DIAGNOSIS — B96.89 ACUTE BACTERIAL SINUSITIS: Primary | ICD-10-CM

## 2018-12-16 PROBLEM — N28.9 KIDNEY DISEASE: Status: ACTIVE | Noted: 2018-12-16

## 2018-12-16 PROBLEM — I51.9 HEART DISEASE: Status: ACTIVE | Noted: 2018-12-16

## 2018-12-16 PROBLEM — E11.9 DIABETES MELLITUS (CMD): Status: ACTIVE | Noted: 2018-12-16

## 2018-12-16 PROCEDURE — 99214 OFFICE O/P EST MOD 30 MIN: CPT | Performed by: NURSE PRACTITIONER

## 2018-12-16 PROCEDURE — 3075F SYST BP GE 130 - 139MM HG: CPT | Performed by: NURSE PRACTITIONER

## 2018-12-16 PROCEDURE — 3078F DIAST BP <80 MM HG: CPT | Performed by: NURSE PRACTITIONER

## 2018-12-16 RX ORDER — FENOFIBRATE 150 MG/1
CAPSULE ORAL
COMMUNITY

## 2018-12-16 RX ORDER — AMITRIPTYLINE HYDROCHLORIDE 25 MG/1
TABLET, FILM COATED ORAL
COMMUNITY

## 2018-12-16 RX ORDER — MECLIZINE HYDROCHLORIDE 25 MG/1
TABLET ORAL
COMMUNITY

## 2018-12-16 RX ORDER — ERGOCALCIFEROL 1.25 MG/1
CAPSULE ORAL
COMMUNITY

## 2018-12-16 RX ORDER — AMOXICILLIN AND CLAVULANATE POTASSIUM 875; 125 MG/1; MG/1
1 TABLET, FILM COATED ORAL EVERY 12 HOURS
Qty: 20 TABLET | Refills: 0 | Status: SHIPPED | OUTPATIENT
Start: 2018-12-16 | End: 2018-12-26

## 2018-12-16 RX ORDER — LEVOCETIRIZINE DIHYDROCHLORIDE 5 MG/1
TABLET, FILM COATED ORAL
COMMUNITY

## 2018-12-16 RX ORDER — HYDROCODONE BITARTRATE AND ACETAMINOPHEN 5; 325 MG/1; MG/1
TABLET ORAL
Refills: 0 | COMMUNITY
Start: 2018-11-07

## 2018-12-16 RX ORDER — PRAVASTATIN SODIUM 40 MG
TABLET ORAL
COMMUNITY

## 2018-12-16 RX ORDER — FLUTICASONE PROPIONATE 50 MCG
SPRAY, SUSPENSION (ML) NASAL
COMMUNITY
End: 2018-12-16

## 2018-12-16 RX ORDER — LOSARTAN POTASSIUM 50 MG/1
TABLET ORAL
COMMUNITY

## 2018-12-16 RX ORDER — METOPROLOL SUCCINATE 50 MG/1
TABLET, EXTENDED RELEASE ORAL
COMMUNITY

## 2018-12-16 RX ORDER — ALBUTEROL SULFATE 90 UG/1
2 AEROSOL, METERED RESPIRATORY (INHALATION) EVERY 4 HOURS PRN
COMMUNITY

## 2018-12-16 RX ORDER — CLOPIDOGREL BISULFATE 75 MG/1
75 TABLET ORAL DAILY
COMMUNITY

## 2018-12-16 RX ORDER — INSULIN ASPART 100 [IU]/ML
INJECTION, SOLUTION INTRAVENOUS; SUBCUTANEOUS
COMMUNITY

## 2018-12-16 ASSESSMENT — ENCOUNTER SYMPTOMS
SORE THROAT: 0
COUGH: 1
FEVER: 0
NAUSEA: 0
VOMITING: 0
WHEEZING: 0
CHILLS: 0
ABDOMINAL PAIN: 0

## 2018-12-18 ENCOUNTER — APPOINTMENT (OUTPATIENT)
Dept: PHYSICAL THERAPY | Facility: HOSPITAL | Age: 58
End: 2018-12-18
Attending: Other
Payer: COMMERCIAL

## 2018-12-21 ENCOUNTER — HOSPITAL ENCOUNTER (OUTPATIENT)
Dept: PHYSICAL THERAPY | Facility: HOSPITAL | Age: 58
Setting detail: THERAPIES SERIES
Discharge: HOME | End: 2018-12-21
Attending: Other
Payer: COMMERCIAL

## 2018-12-21 PROCEDURE — 97110 THERAPEUTIC EXERCISES: CPT

## 2018-12-21 NOTE — PROGRESS NOTES
Dx: Lumbar radiculopathy (M54.16)  Claudication of both lower extremities (HCC) (I73.9)         Authorized # of Visits:  60/yr         Next MD visit: none scheduled  Fall Risk: standard         Precautions: n/a        Discharge Summary    Pt has attended 1 ambulate for 30 minutes at a time while maintaining a pain level <4/10 within 8 visits MET, the patient walked 1.5 miles while maintaining a low pain level.   2. The patient will demonstrate the ability to squat down an  a light object from the floor press with red tube band, 2x15 ea Standing 3 way hip to improve lumbopelvic strength, 2x6 ea   Hip PROM to improve mobility, into flexion/IR  Hip PROM to improve mobility, reduce pain Bridge 2x10 to improve core and posterior chain strength Standing pallof balance strategies Step ups on round side of BOSU, 2x10 ea to improve core stability sidelying central/unilatearl P-A mobilizations grade II to reduce pain Standing hip flexor stretching to improve flexibility, 3x30 sec ea X   X Seated swiss ball rolling f

## 2019-01-04 ENCOUNTER — OFFICE VISIT (OUTPATIENT)
Dept: NEUROLOGY | Facility: CLINIC | Age: 59
End: 2019-01-04
Payer: COMMERCIAL

## 2019-01-04 VITALS
HEIGHT: 72.5 IN | HEART RATE: 88 BPM | SYSTOLIC BLOOD PRESSURE: 136 MMHG | BODY MASS INDEX: 34.83 KG/M2 | DIASTOLIC BLOOD PRESSURE: 74 MMHG | RESPIRATION RATE: 16 BRPM | WEIGHT: 260 LBS

## 2019-01-04 DIAGNOSIS — I73.9 CLAUDICATION OF BOTH LOWER EXTREMITIES (HCC): ICD-10-CM

## 2019-01-04 DIAGNOSIS — E11.40 TYPE 2 DIABETES, UNCONTROLLED, WITH NEUROPATHY (HCC): Primary | ICD-10-CM

## 2019-01-04 DIAGNOSIS — E11.65 TYPE 2 DIABETES, UNCONTROLLED, WITH NEUROPATHY (HCC): Primary | ICD-10-CM

## 2019-01-04 PROCEDURE — 99213 OFFICE O/P EST LOW 20 MIN: CPT | Performed by: OTHER

## 2019-01-04 NOTE — PATIENT INSTRUCTIONS
Continue gabapentin 600 mg tid  Follow up in 6 months  Continue PT  Refill policies:    • Allow 2-3 business days for refills; controlled substances may take longer.   • Contact your pharmacy at least 5 days prior to running out of medication and have them without insurance authorization, patient may be responsible for the entire amount billed. Precertification and Prior Authorizations: If your physician has recommended that you have a procedure or additional testing performed.   Kayla Reich 103

## 2019-01-04 NOTE — PROGRESS NOTES
[facility] Progress Note    HPI    Follow-up: Neuropathy, dizziness    As per my initial note, 12/31/15:  Bethany Rosenthal is a 62year old man, with past medical history significant for multiple medical problems, including hypertension, injury and swelling to his R knee and has been seeing orthopedics; had MRI R knee done and is awaiting results. Tingling in feet is well controlled on current medications.                        Past Medical History:   Diagnosis Date   • Acute kidney 12-5-12    AHI 93 RDI 97 SaO2 siri 62%  CPAP 11 Premier   • Valvular disease    • Ventral hernia 6/11/2012   • Visual impairment    • Vitamin D deficiency 1/7/2015    Dx in 1/2015   • Walking pneumonia 03/2017     Past Surgical History:   Procedure Alver [place] Disorder Father         CABG at age 46s   • Diabetes Father         Type 2 DM   • Other (Other) Father    • Cancer Mother         Skin   • Diabetes Mother         Type 2 DM   • Heart Disorder Mother    • Hypertension Mother    • Lipids Mother    • Other (Madeleine Brunner Shortness of Breath., Disp: 1 Inhaler, Rfl: 3  •  Amitriptyline HCl 25 MG Oral Tab, TAKE 1 TABLET BY MOUTH NIGHTLY, Disp: 90 tablet, Rfl: 0  •  LEVEMIR FLEXTOUCH 100 UNIT/ML Subcutaneous Solution Pen-injector, INJECT 40 UNITS EVERY MORNING AND 35 UNITS BIJAN otherwise as noted in HPI.     Exam:  /74 (BP Location: Left arm, Patient Position: Sitting, Cuff Size: large)   Pulse 88   Resp 16   Ht 72.5\"   Wt 260 lb   BMI 34.78 kg/m²   Estimated body mass index is 34.78 kg/m² as calculated from the following: A/G RATIO       1.56   PROTEIN ELECT INTERPRETATION       No apparent monoclonal protein on serum electrophoresis. . . . IMMUNOFIXATION       No monoclonal protein detected by immunofixation. . . .    KAPPA FREE LIGHT CHAIN      0.330-1.940 mg/dL 4.194 spinal canal stenosis. Mild bilateral neural foraminal stenosis. L5-S1:  Minimal disc bulge is noted. This effaces the right lateral recess and may abut the right S1 nerve root.   No spinal canal or neural foraminal stenosis.       =====  CONCLUSION: response was abnormal due to prolonged   distal motor latency, with reduced conduction velocity and   significant reduction in amplitude on proximal stimulation.     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

## 2019-02-22 ENCOUNTER — ORDER TRANSCRIPTION (OUTPATIENT)
Dept: PHYSICAL THERAPY | Age: 59
End: 2019-02-22

## 2019-02-22 DIAGNOSIS — Z98.890 S/P RIGHT KNEE ARTHROSCOPY: Primary | ICD-10-CM

## 2019-02-22 DIAGNOSIS — S83.241D ACUTE MEDIAL MENISCUS TEAR OF RIGHT KNEE, SUBSEQUENT ENCOUNTER: ICD-10-CM

## 2019-02-22 PROBLEM — S83.241A ACUTE MEDIAL MENISCUS TEAR OF RIGHT KNEE: Status: ACTIVE | Noted: 2019-02-22

## 2019-02-25 ENCOUNTER — OFFICE VISIT (OUTPATIENT)
Dept: PHYSICAL THERAPY | Age: 59
End: 2019-02-25
Attending: PHYSICIAN ASSISTANT
Payer: COMMERCIAL

## 2019-02-25 DIAGNOSIS — S83.241D ACUTE MEDIAL MENISCUS TEAR OF RIGHT KNEE, SUBSEQUENT ENCOUNTER: ICD-10-CM

## 2019-02-25 DIAGNOSIS — Z98.890 S/P RIGHT KNEE ARTHROSCOPY: ICD-10-CM

## 2019-02-25 PROCEDURE — 97110 THERAPEUTIC EXERCISES: CPT

## 2019-02-25 PROCEDURE — 97161 PT EVAL LOW COMPLEX 20 MIN: CPT

## 2019-02-25 NOTE — PROGRESS NOTES
LOWER EXTREMITY EVALUATION:   Referring Physician: Dr. Jay Sylvester  Diagnosis: R Knee Partial Medial Menisectomy     Date of Service: 2/25/2019     PATIENT SUMMARY   India Chahal is a 62year old y/o male who presents to therapy today with complaints of injury he's been dealing with for years. He denies any saddle anesthesia or bowel/bladder issues. Past medical history was reviewed with Sadie oTrres.  Significant findings include DEEPA, anemia, A-fib, atherosclerosis, cancer, cataract, depression, diabetic neuropat feet.    AROM:   Hip Knee Foot/Ankle   Flexion: R WNL; L WNL   Flexion: R 106 degrees (pain); L 131 degrees  Extension: R 3 degrees (pain); L 0 degrees    DF: R WNL; L WNL         Accessory motion:     Patella Mobility:   R: hypomobility w/ superior/inferi visits  · Pt will improve R SLS to 30 seconds to improve safety with gait on uneven surfaces such as grass and gravel in 8 visits  · Pt will be independent and compliant with comprehensive HEP to maintain progress achieved in PT in 8 visits  · Pt will walk

## 2019-02-25 NOTE — PROGRESS NOTES
LOWER EXTREMITY EVALUATION:   Referring Physician: Dr. Tanya Ramon  Diagnosis: R Knee Partial Medial Menisectomy     Date of Service: 2/25/2019     PATIENT SUMMARY   Mishel Mark is a 62year old y/o male who presents to therapy today with complaints of R ***/5; L ***/5  IR: R ***/5; L ***/5 Flexion: R ***/5; L ***/5  Extension: R ***/5; L ***/5    DF: R ***/5; L ***/5  PF: R ***/5; L ***/5  INV: R ***/5; L ***/5  EV: R ***/5; L ***/5  Great toe ext: R ***/5; L ***/5     Special tests:    Patellar Apprehe Potential:{GOOD:115}    FOTO: ***/100    Patient/Family/Caregiver was advised of these findings, precautions, and treatment options and has agreed to actively participate in planning and for this course of care.     Thank you for your referral. Please co-si

## 2019-02-27 ENCOUNTER — OFFICE VISIT (OUTPATIENT)
Dept: PHYSICAL THERAPY | Age: 59
End: 2019-02-27
Attending: PHYSICIAN ASSISTANT
Payer: COMMERCIAL

## 2019-02-27 DIAGNOSIS — S83.241D ACUTE MEDIAL MENISCUS TEAR OF RIGHT KNEE, SUBSEQUENT ENCOUNTER: ICD-10-CM

## 2019-02-27 DIAGNOSIS — Z98.890 S/P RIGHT KNEE ARTHROSCOPY: ICD-10-CM

## 2019-02-27 PROCEDURE — 97110 THERAPEUTIC EXERCISES: CPT

## 2019-02-27 PROCEDURE — 97140 MANUAL THERAPY 1/> REGIONS: CPT

## 2019-02-27 NOTE — PROGRESS NOTES
Dx: R Knee Partial Medial Menisectomy         Authorized # of Visits:  8         Next MD visit: none scheduled  Fall Risk: standard         Precautions: n/a             Subjective:  The patient stated his right knee feels a little better since his evaluatio NU Step Level 7 - 6 min         Quad sets x 10, 1 set R         SLR x 10, 1 set R and L         Prone Hip Ext R and L x 10, 1 set         Bridges x 10, 1 set         Clam shells x 10 R and L side, 1 set         Sitting Knee Flex/Ext w/ green theraband re

## 2019-03-04 ENCOUNTER — APPOINTMENT (OUTPATIENT)
Dept: PHYSICAL THERAPY | Age: 59
End: 2019-03-04
Attending: PHYSICIAN ASSISTANT
Payer: COMMERCIAL

## 2019-03-06 ENCOUNTER — OFFICE VISIT (OUTPATIENT)
Dept: PHYSICAL THERAPY | Age: 59
End: 2019-03-06
Attending: PHYSICIAN ASSISTANT
Payer: COMMERCIAL

## 2019-03-06 DIAGNOSIS — S83.241D ACUTE MEDIAL MENISCUS TEAR OF RIGHT KNEE, SUBSEQUENT ENCOUNTER: ICD-10-CM

## 2019-03-06 DIAGNOSIS — Z98.890 S/P RIGHT KNEE ARTHROSCOPY: ICD-10-CM

## 2019-03-06 PROCEDURE — 97112 NEUROMUSCULAR REEDUCATION: CPT

## 2019-03-06 PROCEDURE — 97110 THERAPEUTIC EXERCISES: CPT

## 2019-03-06 NOTE — PROGRESS NOTES
Dx: R Knee Partial Medial Menisectomy         Authorized # of Visits:  8         Next MD visit: 3/22/19  Fall Risk: standard         Precautions: n/a             Subjective: The patient walked into therapy today stating his knee pain was a VAS 0.5/10.  He n w/ CGA and the forward/back advancements on the wobble board. The patient stated he felt better at the end of therapy and that he felt no right knee pain.  Royal Cardozo will continue to benefit from skilled PT to improve his deficits with LE strength, flexibility,

## 2019-03-11 ENCOUNTER — OFFICE VISIT (OUTPATIENT)
Dept: PHYSICAL THERAPY | Age: 59
End: 2019-03-11
Attending: PHYSICIAN ASSISTANT
Payer: COMMERCIAL

## 2019-03-11 DIAGNOSIS — S83.241D ACUTE MEDIAL MENISCUS TEAR OF RIGHT KNEE, SUBSEQUENT ENCOUNTER: ICD-10-CM

## 2019-03-11 DIAGNOSIS — Z98.890 S/P RIGHT KNEE ARTHROSCOPY: ICD-10-CM

## 2019-03-11 PROCEDURE — 97110 THERAPEUTIC EXERCISES: CPT

## 2019-03-11 PROCEDURE — 97112 NEUROMUSCULAR REEDUCATION: CPT

## 2019-03-11 NOTE — PROGRESS NOTES
Dx: R Knee Partial Medial Menisectomy         Authorized # of Visits:  8         Next MD visit: 3/22/19  Fall Risk: standard         Precautions: n/a             Subjective: The patient walked into therapy today stating his knee pain was a VAS 2/10.  Gurvinder rossi 10, w/ 3lb weights, 2 set Prone Hip Ext R and L x 10, w/ 3lb weights, 2 set       Bridges x 10, 1 set -- --        Romberg:   EO: 30 sec  EC 30 sec    Semi-Tandem (R foot back):  EO: 30 sec  EC: 30 sec    Tandem (R foot back)  E0: 20 sec  EC: 14 sec    SLS

## 2019-03-13 ENCOUNTER — OFFICE VISIT (OUTPATIENT)
Dept: PHYSICAL THERAPY | Age: 59
End: 2019-03-13
Attending: PHYSICIAN ASSISTANT
Payer: COMMERCIAL

## 2019-03-13 DIAGNOSIS — Z98.890 S/P RIGHT KNEE ARTHROSCOPY: ICD-10-CM

## 2019-03-13 DIAGNOSIS — S83.241D ACUTE MEDIAL MENISCUS TEAR OF RIGHT KNEE, SUBSEQUENT ENCOUNTER: ICD-10-CM

## 2019-03-13 PROCEDURE — 97112 NEUROMUSCULAR REEDUCATION: CPT

## 2019-03-13 PROCEDURE — 97110 THERAPEUTIC EXERCISES: CPT

## 2019-03-13 NOTE — PROGRESS NOTES
Dx: R Knee Partial Medial Menisectomy         Authorized # of Visits:  8         Next MD visit: 3/22/19  Fall Risk: standard         Precautions: n/a             Subjective: The patient walked into therapy today stating his knee pain was a VAS 2/10.  Tanner carroll w/ 3lb weights SLR x 10, 2 set R and L w/ 3lb weights      Prone Hip Ext R and L x 10, 1 set Prone Hip Ext R and L x 10, w/ 3lb weights, 2 set Prone Hip Ext R and L x 10, w/ 3lb weights, 2 set Prone Hip Ext R and L x 10, w/ 3lb weights, 2 set      [place] Treatment Time: 60 min

## 2019-03-18 ENCOUNTER — OFFICE VISIT (OUTPATIENT)
Dept: PHYSICAL THERAPY | Age: 59
End: 2019-03-18
Attending: PHYSICIAN ASSISTANT
Payer: COMMERCIAL

## 2019-03-18 DIAGNOSIS — S83.241D ACUTE MEDIAL MENISCUS TEAR OF RIGHT KNEE, SUBSEQUENT ENCOUNTER: ICD-10-CM

## 2019-03-18 DIAGNOSIS — Z98.890 S/P RIGHT KNEE ARTHROSCOPY: ICD-10-CM

## 2019-03-18 PROCEDURE — 97110 THERAPEUTIC EXERCISES: CPT

## 2019-03-18 PROCEDURE — 97112 NEUROMUSCULAR REEDUCATION: CPT

## 2019-03-18 NOTE — PROGRESS NOTES
Dx: R Knee Partial Medial Menisectomy         Authorized # of Visits:  8         Next MD visit: 3/22/19  Fall Risk: standard         Precautions: n/a             Subjective: The patient walked into therapy today stating his knee pain was a VAS 1/10.  He sta L x 10, w/ 3lb weights, 2 set Prone Hip Ext R and L x 10, w/ 3lb weights, 2 set Prone Hip Ext R and L x 10, w/ 4lb weights, 2 set     Bridges x 10, 1 set -- -- -- --      Romberg:   EO: 30 sec  EC 30 sec    Semi-Tandem (R foot back):  EO: 30 sec  EC: 30 se Sidelying SLR w/ 3lb weight x 10, 2 sets Sidelying SLR w/ 3lb weight x 10, 2 sets Sidelying SLR w/ 4lb weight x 10, 2 sets         Ice to R Knee - 10 mins                  Charges: NM x 1 Therex x 3       Total Timed Treatment: 60 min  Total Treatment Time

## 2019-03-19 DIAGNOSIS — E11.40 TYPE 2 DIABETES, UNCONTROLLED, WITH NEUROPATHY (HCC): ICD-10-CM

## 2019-03-19 DIAGNOSIS — E11.65 TYPE 2 DIABETES, UNCONTROLLED, WITH NEUROPATHY (HCC): ICD-10-CM

## 2019-03-19 DIAGNOSIS — R20.2 PARESTHESIAS: ICD-10-CM

## 2019-03-20 ENCOUNTER — OFFICE VISIT (OUTPATIENT)
Dept: PHYSICAL THERAPY | Age: 59
End: 2019-03-20
Attending: PHYSICIAN ASSISTANT
Payer: COMMERCIAL

## 2019-03-20 DIAGNOSIS — Z98.890 S/P RIGHT KNEE ARTHROSCOPY: ICD-10-CM

## 2019-03-20 DIAGNOSIS — S83.241D ACUTE MEDIAL MENISCUS TEAR OF RIGHT KNEE, SUBSEQUENT ENCOUNTER: ICD-10-CM

## 2019-03-20 PROCEDURE — 97110 THERAPEUTIC EXERCISES: CPT

## 2019-03-20 RX ORDER — GABAPENTIN 300 MG/1
CAPSULE ORAL
Qty: 540 CAPSULE | Refills: 1 | Status: SHIPPED | OUTPATIENT
Start: 2019-03-20 | End: 2019-09-10

## 2019-03-20 NOTE — TELEPHONE ENCOUNTER
Medication: gabapentin 300 MG Oral Cap    Date of last refill: 10/08/2018 (#540/1)  [place]    Last office visit: 1/4/2019  Due back to clinic per last office note:  06/26/2019  Date next office visit scheduled:    F mildly impaired gait, and continued evidence of peripheral neuropathy, most likely due to chronic poorly controlled diabetes.     MRI lumbar spine done and he has been doing somewhat better with walking in terms of claudication symptoms since doing PT and

## 2019-03-20 NOTE — PROGRESS NOTES
Progress/Discharge Summary    Pt has attended 7 visits in Physical Therapy. Subjective: Maria Dolores Bedolla came into therapy today stating he had no right knee pain. The patient performed his HEP once yesterday.  He feels he's made substantial progress at this time therapy at that time.     Goals:     · Pt will improve R knee extension ROM to 0 deg to allow proper heel strike during gait and terminal knee extension in stance 8 visits -MET  · Pt will improve R knee AROM flexion to 130 degrees to improve ability to neg sec  EC: 30 sec     Tandem (R foot back)  E0: 20 sec  EC: 14 sec     SLS:  R: 4 sec  L: 5 sec       SLS:  R: 5 sec  L: 12 sec    -- --  SLS:  R: 9 sec  L: 14 sec         BOSU forward/lateral step ups R side x 10, 1 set each BOSU forward/lateral step ups R -- --  --       Sidelying SLR w/ 1lb weight x 10, 2 sets Sidelying SLR w/ 3lb weight x 10, 2 sets Sidelying SLR w/ 3lb weight x 10, 2 sets Sidelying SLR w/ 4lb weight x 10, 2 sets  --             Ice to R Knee - 10 mins  --                           Charge

## 2019-03-25 ENCOUNTER — OFFICE VISIT (OUTPATIENT)
Dept: PHYSICAL THERAPY | Age: 59
End: 2019-03-25
Attending: PHYSICIAN ASSISTANT
Payer: COMMERCIAL

## 2019-03-25 PROCEDURE — 97110 THERAPEUTIC EXERCISES: CPT

## 2019-03-25 NOTE — PROGRESS NOTES
Dx: R Knee Partial Medial Menisectomy         Authorized # of Visits:  8         Next MD visit: N/A  Fall Risk: standard         Precautions: n/a      Subjective: Geoff Mobley came into therapy today stating he had no right knee pain.  The patient has been adheri seconds to improve safety with gait on uneven surfaces such as grass and gravel in 8 visits - NOT MET  · Pt will be independent and compliant with comprehensive HEP to maintain progress achieved in PT in 8 visits - MET  · Pt will walk for 30 minutes with < x 10, 2 sets, R side -- -- -- --  --  --     Tandem walking inside parellel bars x 2 laps Tandem walking inside parellel bars x 2 laps Tandem walking inside parellel bars w/ and w/o hurdles x 2 laps --  --  --     Wobble Board For/back and side/side x 10,

## 2019-04-10 PROBLEM — E11.65 UNCONTROLLED TYPE 2 DIABETES MELLITUS WITH HYPERGLYCEMIA (HCC): Status: ACTIVE | Noted: 2019-04-10

## 2019-04-10 PROBLEM — E11.42 TYPE 2 DIABETES MELLITUS WITH PERIPHERAL NEUROPATHY (HCC): Status: ACTIVE | Noted: 2019-04-10

## 2019-04-24 ENCOUNTER — APPOINTMENT (OUTPATIENT)
Dept: PHYSICAL THERAPY | Age: 59
End: 2019-04-24
Attending: PHYSICIAN ASSISTANT
Payer: COMMERCIAL

## 2019-05-05 ENCOUNTER — HOSPITAL ENCOUNTER (EMERGENCY)
Facility: HOSPITAL | Age: 59
Discharge: HOME | End: 2019-05-05
Attending: EMERGENCY MEDICINE
Payer: COMMERCIAL

## 2019-05-05 ENCOUNTER — APPOINTMENT (OUTPATIENT)
Dept: MRI IMAGING | Facility: HOSPITAL | Age: 59
End: 2019-05-05
Attending: EMERGENCY MEDICINE
Payer: COMMERCIAL

## 2019-05-05 VITALS
HEART RATE: 79 BPM | HEIGHT: 73 IN | OXYGEN SATURATION: 97 % | DIASTOLIC BLOOD PRESSURE: 70 MMHG | SYSTOLIC BLOOD PRESSURE: 141 MMHG | BODY MASS INDEX: 34.97 KG/M2 | RESPIRATION RATE: 14 BRPM | TEMPERATURE: 98 F | WEIGHT: 263.88 LBS

## 2019-05-05 DIAGNOSIS — R20.0 LEFT ARM NUMBNESS: Primary | ICD-10-CM

## 2019-05-05 PROCEDURE — 93005 ELECTROCARDIOGRAM TRACING: CPT

## 2019-05-05 PROCEDURE — 70553 MRI BRAIN STEM W/O & W/DYE: CPT | Performed by: EMERGENCY MEDICINE

## 2019-05-05 PROCEDURE — 93010 ELECTROCARDIOGRAM REPORT: CPT | Performed by: EMERGENCY MEDICINE

## 2019-05-05 PROCEDURE — 85025 COMPLETE CBC W/AUTO DIFF WBC: CPT | Performed by: EMERGENCY MEDICINE

## 2019-05-05 PROCEDURE — 99285 EMERGENCY DEPT VISIT HI MDM: CPT | Performed by: EMERGENCY MEDICINE

## 2019-05-05 PROCEDURE — 80053 COMPREHEN METABOLIC PANEL: CPT | Performed by: EMERGENCY MEDICINE

## 2019-05-05 PROCEDURE — 36415 COLL VENOUS BLD VENIPUNCTURE: CPT | Performed by: EMERGENCY MEDICINE

## 2019-05-05 PROCEDURE — A9575 INJ GADOTERATE MEGLUMI 0.1ML: HCPCS | Performed by: EMERGENCY MEDICINE

## 2019-05-05 NOTE — ED PROVIDER NOTES
Patient Seen in: [facility] Emergency Department    History   Patient presents with:  Numbness Weakness (neurologic)    Stated Complaint: numbness on left side of face and arm over 1 week    HPI    Nearly 1 week of intermittent left-sided face and a 12/27/2012    Right nephrectomy in 2010    • RENAL DISEASE    • Renal disorder    • S/p nephrectomy 12/27/2012    Right nephrectomy in 2010 for renal cancer    • TIA (transient ischemic attack)    • Type 2 diabetes mellitus with vascular disease ([facility] [address] 3/9/ LAPAROSCOPY, SURGICAL; NEPHRECTOMY     • OTHER SURGICAL HISTORY  At age 45s    Lipoma Excision, Neck   • OTHER SURGICAL HISTORY  4/24/17    Cystoscopy/ Flow US- Dr. Ashwini Fiore    • OTHER SURGICAL HISTORY  10/30/2017    flow us dr Syed Jenkins   • UPPER GI ENDOSCOPY,BIOPSY fluent and not slurred. There is no word finding difficulty. No neglect. No gaze  No visual field deficit. There is no pronator drift. Strength is 5 out of 5 in the upper extremities bilaterally.     Sensation is symmetric in the upper extremitie significant change compared to previous EKG. MDM   While there are no objective deficits on exam, his history of TIA and CAD taken high risk for acute stroke but things reasonable to order an MRI which we will do.   Reassess afterwards,

## 2019-05-05 NOTE — ED NOTES
Pt awake and alert, skin w/d,resps reg/unlabored. Pt appears comfortable and in no distress. Pt's wife at bedside.

## 2019-05-05 NOTE — ED INITIAL ASSESSMENT (HPI)
Pt presents to the ED with complaints of numbness to left side of face and left arm that began around 1230. Pt states he has been having intermittent numbness in same area for past 2 weeks and is scheduled for an MRI this coming Wednesday.  Pt awake and maximilian

## 2019-05-06 ENCOUNTER — OFFICE VISIT (OUTPATIENT)
Dept: NEUROLOGY | Facility: CLINIC | Age: 59
End: 2019-05-06
Payer: COMMERCIAL

## 2019-05-06 VITALS
SYSTOLIC BLOOD PRESSURE: 134 MMHG | HEART RATE: 84 BPM | RESPIRATION RATE: 18 BRPM | BODY MASS INDEX: 35.76 KG/M2 | HEIGHT: 72 IN | DIASTOLIC BLOOD PRESSURE: 70 MMHG | WEIGHT: 264 LBS

## 2019-05-06 DIAGNOSIS — R20.0 NUMBNESS AND TINGLING IN LEFT HAND: Primary | ICD-10-CM

## 2019-05-06 DIAGNOSIS — R29.898 LEFT ARM WEAKNESS: ICD-10-CM

## 2019-05-06 DIAGNOSIS — R20.2 LEFT FACE AND LEFT ARM TINGLING: ICD-10-CM

## 2019-05-06 DIAGNOSIS — R20.2 NUMBNESS AND TINGLING IN LEFT HAND: Primary | ICD-10-CM

## 2019-05-06 PROCEDURE — 99214 OFFICE O/P EST MOD 30 MIN: CPT | Performed by: OTHER

## 2019-05-06 NOTE — PROGRESS NOTES
[facility] Progress Note    HPI    Follow-up: Neuropathy, dizziness    As per my initial note, 12/31/15:  Carmela Sosa is a 62year old man, with past medical history significant for multiple medical problems, including hypertension, sometimes has issues with picking up objects with left hand; he had MRI in ED, which was negative for acute ischemia.              Past Medical History:   Diagnosis Date   • Acute kidney injury ([address] 3/15/2017   • DEEPA (acute kidney injury) ([address] 3/15/2017 disease    • Ventral hernia 6/11/2012   • Visual impairment    • Vitamin D deficiency 1/7/2015    Dx in 1/2015   • Walking pneumonia 03/2017     Past Surgical History:   Procedure Laterality Date   • ANGIOGRAM     • ANGIOPLASTY (CORONARY)  07/21/2017    DE 2 DM   • Other (Other) Father    • Cancer Mother         Skin   • Diabetes Mother         Type 2 DM   • Heart Disorder Mother    • Hypertension Mother    • Lipids Mother    • Other (Other) Mother         TIA/ prolapse valve   • Diabetes Maternal Grandfathe 1  •  Alfuzosin HCl ER 10 MG Oral Tablet 24 Hr, TAKE 1 TABLET (10 MG TOTAL) BY MOUTH DAILY WITH BREAKFAST., Disp: 90 tablet, Rfl: 3  •  gabapentin 300 MG Oral Cap, TAKE 2 CAPSULES BY MOUTH 3 TIMES A DAY, Disp: 540 capsule, Rfl: 1  •  Glucose Blood (ACCU-CH Tab, Take 1 tablet (10 mg total) by mouth 3 (three) times daily. , Disp: 90 tablet, Rfl: 3  •  Omega-3-acid Ethyl Esters 1 g Oral Cap, TAKE 2 CAPSULES BY MOUTH TWICE A DAY, Disp: 360 capsule, Rfl: 1  •  Blood Glucose Monitoring Suppl (ACCU-CHEK SHEBA PLUS) hands  Coord: FNF intact with no tremor or dysmetria; rapid alternating movements intact bilaterally  Romberg: absent though sways   Gait: casual gait broad based and antalgic, limping on R leg; unable to tandem    Labs:     New since last visit:     Gaby Bone Albumin      3.4 - 5.0 g/dL 3.6   Globulin      2.8 - 4.4 g/dL 4.1   A/G Ratio      1.0 - 2.0 0.9 (L)     Prior as noted below:    Component      Latest Ref Rng 2/2/2016   Total Protein      6.1-7.9 g/dL 7.6   ALBUMIN, SERUM      3.40-4.70 g/dL 4.63   AL are no extra-axial fluid collections. There is no midline shift. SINUSES:       Mild mucosal thickening ethmoid air cells and left maxillary sinus. Eddye Cozier     MASTOIDS:       The mastoids are clear.     =====  CONCLUSION:  No MR evidence for acute intracranial (9/25/16):    CONCLUSION:     Normal MR angiography of the extracranial carotid and vertebral circulations. No regions of occlusion or significant stenosis noted. No aneurysmal dilatation identified. MRI brain / MRA brain (11/23/15):   Independently re muscles of the upper extremity (FDI). Impression: This is an abnormal study.  There is electrophysiologic evidence   consistent with a severe, chronic mixed type, primarily axonal,   sensorimotor, length dependent polyneuropathy, with ongoing   distal d and tingling in left hand  (primary encounter diagnosis)  Plan: EMG ([address]),         MRI SPINE CERVICAL (W+WO) (CPT=72156)        As noted above     (R20.2) Left face and left arm tingling  Plan: MRI SPINE CERVICAL (W+WO) (

## 2019-05-06 NOTE — ED NOTES
Pt back from MRI without incident. Pt requesting water but instructed to remain NPO for now. Pt states that the numbness to his left arm is still gone but still with some numbness to left side of face. Wife at bedside.  Report given to [address] at this keith

## 2019-05-12 ENCOUNTER — HOSPITAL ENCOUNTER (OUTPATIENT)
Dept: MRI IMAGING | Age: 59
Discharge: HOME | End: 2019-05-12
Attending: Other
Payer: COMMERCIAL

## 2019-05-12 DIAGNOSIS — R20.2 LEFT FACE AND LEFT ARM TINGLING: ICD-10-CM

## 2019-05-12 DIAGNOSIS — R20.2 NUMBNESS AND TINGLING IN LEFT HAND: ICD-10-CM

## 2019-05-12 DIAGNOSIS — R29.898 LEFT ARM WEAKNESS: ICD-10-CM

## 2019-05-12 DIAGNOSIS — R20.0 NUMBNESS AND TINGLING IN LEFT HAND: ICD-10-CM

## 2019-05-12 PROCEDURE — 72156 MRI NECK SPINE W/O & W/DYE: CPT | Performed by: OTHER

## 2019-05-12 PROCEDURE — A9575 INJ GADOTERATE MEGLUMI 0.1ML: HCPCS | Performed by: OTHER

## 2019-05-14 ENCOUNTER — PROCEDURE VISIT (OUTPATIENT)
Dept: NEUROLOGY | Facility: CLINIC | Age: 59
End: 2019-05-14
Payer: COMMERCIAL

## 2019-05-14 DIAGNOSIS — R20.2 NUMBNESS AND TINGLING IN LEFT HAND: Primary | ICD-10-CM

## 2019-05-14 DIAGNOSIS — R29.898 LEFT ARM WEAKNESS: ICD-10-CM

## 2019-05-14 DIAGNOSIS — R20.0 NUMBNESS AND TINGLING IN LEFT HAND: Primary | ICD-10-CM

## 2019-05-14 PROCEDURE — 95911 NRV CNDJ TEST 9-10 STUDIES: CPT | Performed by: OTHER

## 2019-05-14 PROCEDURE — 95886 MUSC TEST DONE W/N TEST COMP: CPT | Performed by: OTHER

## 2019-05-20 ENCOUNTER — TELEPHONE (OUTPATIENT)
Dept: NEUROLOGY | Facility: CLINIC | Age: 59
End: 2019-05-20

## 2019-05-20 DIAGNOSIS — G62.9 NEUROPATHY: Primary | ICD-10-CM

## 2019-05-28 NOTE — PROCEDURES
[facility]  [address]  Ph: [phone]  FAX: [phone]        Full Name: Jaylene James Gender: Male  Patient ID: [id number] YOB: 1960      Visit Date: 5/14/2019 09:02  Age Wrist Dig V 2.76 3.39 4.8 4.6 Wrist - Dig V 12  43      B. Elbow Dig V 4.32 5.05 13.4 12.1 B. Elbow - Wrist  1.67    R Radial - Anatomical snuff box (Forearm)      Forearm Wrist 1.98 2.55 11.2 26.0 Forearm - Wrist 10  51   L Radial - Anatomical snuff box R. Deltoid Axillary C5-C6 N None None None None N N N N   R. Biceps brachii Musculocutaneous C5-C6 N None None None None N N N N   R. Triceps brachii Radial C6-C8 N None None None None N N N N   R.  Extensor digitorum communis Radial C7-C8 N None None None 9. Right ulnar motor response was abnormal due to slowed conduction velocity with normal amplitude and normal distal motor latency; F wave was prolonged.    10. Left ulnar motor response was abnormal due to slowed conduction velocity with normal amplitude a

## 2019-06-10 ENCOUNTER — TELEPHONE (OUTPATIENT)
Dept: SURGERY | Facility: CLINIC | Age: 59
End: 2019-06-10

## 2019-06-10 NOTE — TELEPHONE ENCOUNTER
Called [facility] and spoke with Stefania Manuel. Codes 14014,95321 are valid no authorization is needed. Call reference #[id number].  Time on call 6:07

## 2019-06-11 PROBLEM — Z98.890 S/P LEFT KNEE ARTHROSCOPY: Status: ACTIVE | Noted: 2019-06-11

## 2019-06-11 PROBLEM — S83.242A ACUTE MEDIAL MENISCUS TEAR OF LEFT KNEE: Status: ACTIVE | Noted: 2019-06-11

## 2019-06-11 PROBLEM — S83.282A ACUTE LATERAL MENISCUS TEAR OF LEFT KNEE, INITIAL ENCOUNTER: Status: ACTIVE | Noted: 2019-06-11

## 2019-06-14 NOTE — IMAGING NOTE
Spoke w/pt. LD plavix and ASA were 6/13. Will continue to hold until after LP procedure scheduled for 6/20.

## 2019-06-20 ENCOUNTER — LAB ENCOUNTER (OUTPATIENT)
Dept: LAB | Facility: HOSPITAL | Age: 59
End: 2019-06-20
Attending: Other
Payer: COMMERCIAL

## 2019-06-20 ENCOUNTER — HOSPITAL ENCOUNTER (OUTPATIENT)
Dept: GENERAL RADIOLOGY | Facility: HOSPITAL | Age: 59
Discharge: HOME | End: 2019-06-20
Attending: Other
Payer: COMMERCIAL

## 2019-06-20 VITALS
OXYGEN SATURATION: 97 % | HEART RATE: 73 BPM | BODY MASS INDEX: 33.8 KG/M2 | TEMPERATURE: 99 F | RESPIRATION RATE: 16 BRPM | HEIGHT: 73 IN | SYSTOLIC BLOOD PRESSURE: 140 MMHG | DIASTOLIC BLOOD PRESSURE: 76 MMHG | WEIGHT: 255 LBS

## 2019-06-20 DIAGNOSIS — G62.9 NEUROPATHY: ICD-10-CM

## 2019-06-20 PROCEDURE — 82042 OTHER SOURCE ALBUMIN QUAN EA: CPT

## 2019-06-20 PROCEDURE — 83916 OLIGOCLONAL BANDS: CPT

## 2019-06-20 PROCEDURE — 83883 ASSAY NEPHELOMETRY NOT SPEC: CPT

## 2019-06-20 PROCEDURE — 88108 CYTOPATH CONCENTRATE TECH: CPT

## 2019-06-20 PROCEDURE — 84165 PROTEIN E-PHORESIS SERUM: CPT

## 2019-06-20 PROCEDURE — 85610 PROTHROMBIN TIME: CPT

## 2019-06-20 PROCEDURE — 82962 GLUCOSE BLOOD TEST: CPT

## 2019-06-20 PROCEDURE — 89051 BODY FLUID CELL COUNT: CPT

## 2019-06-20 PROCEDURE — 82784 ASSAY IGA/IGD/IGG/IGM EACH: CPT

## 2019-06-20 PROCEDURE — 86334 IMMUNOFIX E-PHORESIS SERUM: CPT

## 2019-06-20 PROCEDURE — 84157 ASSAY OF PROTEIN OTHER: CPT

## 2019-06-20 PROCEDURE — 85027 COMPLETE CBC AUTOMATED: CPT | Performed by: RADIOLOGY

## 2019-06-20 PROCEDURE — 77003 FLUOROGUIDE FOR SPINE INJECT: CPT | Performed by: OTHER

## 2019-06-20 PROCEDURE — 82945 GLUCOSE OTHER FLUID: CPT

## 2019-06-20 PROCEDURE — 36415 COLL VENOUS BLD VENIPUNCTURE: CPT

## 2019-06-20 PROCEDURE — 62270 DX LMBR SPI PNXR: CPT | Performed by: OTHER

## 2019-06-20 RX ORDER — ACETAMINOPHEN 325 MG/1
650 TABLET ORAL EVERY 6 HOURS PRN
Status: CANCELLED | OUTPATIENT
Start: 2019-06-20

## 2019-06-20 RX ORDER — ACETAMINOPHEN 325 MG/1
650 TABLET ORAL EVERY 6 HOURS PRN
Status: DISCONTINUED | OUTPATIENT
Start: 2019-06-20 | End: 2019-06-22

## 2019-06-20 NOTE — IMAGING NOTE
Phoned report to [facility], pt having LP then to [facility]

## 2019-06-24 PROBLEM — G56.03 CARPAL TUNNEL SYNDROME, BILATERAL: Status: ACTIVE | Noted: 2019-06-24

## 2019-07-08 ENCOUNTER — TELEPHONE (OUTPATIENT)
Dept: NEUROLOGY | Facility: CLINIC | Age: 59
End: 2019-07-08

## 2019-07-08 NOTE — TELEPHONE ENCOUNTER
Please call pt to discuss LP results. He is seeing everything in [facility], but would like to speak with Dr. Nemo Thorne.

## 2019-07-10 ENCOUNTER — APPOINTMENT (OUTPATIENT)
Dept: PHYSICAL THERAPY | Age: 59
End: 2019-07-10
Attending: PHYSICIAN ASSISTANT
Payer: COMMERCIAL

## 2019-07-11 ENCOUNTER — APPOINTMENT (OUTPATIENT)
Dept: PHYSICAL THERAPY | Age: 59
End: 2019-07-11
Attending: PHYSICIAN ASSISTANT
Payer: COMMERCIAL

## 2019-07-15 PROBLEM — M25.562 CHRONIC PAIN OF LEFT KNEE: Status: ACTIVE | Noted: 2019-07-15

## 2019-07-15 PROBLEM — G89.29 CHRONIC PAIN OF LEFT KNEE: Status: ACTIVE | Noted: 2019-07-15

## 2019-07-19 NOTE — TELEPHONE ENCOUNTER
Combined [employer] message sent - patient aware of results - nothing urgent to do - tried to call several times but no answer - can discuss at next visit

## 2019-07-30 PROCEDURE — 81003 URINALYSIS AUTO W/O SCOPE: CPT | Performed by: INTERNAL MEDICINE

## 2019-07-30 PROCEDURE — 84156 ASSAY OF PROTEIN URINE: CPT | Performed by: INTERNAL MEDICINE

## 2019-08-14 PROBLEM — I95.1 ORTHOSTATIC HYPOTENSION: Status: ACTIVE | Noted: 2019-08-14

## 2019-08-14 PROBLEM — E78.5 HYPERLIPIDEMIA, UNSPECIFIED: Status: RESOLVED | Noted: 2017-04-05 | Resolved: 2019-08-14

## 2019-08-15 ENCOUNTER — HOSPITAL ENCOUNTER (OUTPATIENT)
Dept: GENERAL RADIOLOGY | Age: 59
Discharge: HOME | End: 2019-08-15
Attending: NURSE PRACTITIONER
Payer: COMMERCIAL

## 2019-08-15 DIAGNOSIS — R94.2 ABNORMAL PULMONARY FUNCTION TEST: ICD-10-CM

## 2019-08-15 PROCEDURE — 71046 X-RAY EXAM CHEST 2 VIEWS: CPT | Performed by: NURSE PRACTITIONER

## 2019-09-10 DIAGNOSIS — R20.2 PARESTHESIAS: ICD-10-CM

## 2019-09-10 DIAGNOSIS — E11.40 TYPE 2 DIABETES, UNCONTROLLED, WITH NEUROPATHY (HCC): ICD-10-CM

## 2019-09-10 DIAGNOSIS — E11.65 TYPE 2 DIABETES, UNCONTROLLED, WITH NEUROPATHY (HCC): ICD-10-CM

## 2019-09-10 RX ORDER — GABAPENTIN 300 MG/1
CAPSULE ORAL
Qty: 540 CAPSULE | Refills: 0 | Status: SHIPPED | OUTPATIENT
Start: 2019-09-10 | End: 2019-09-11

## 2019-09-10 NOTE — TELEPHONE ENCOUNTER
Medication:Gabapentin 600 mg  Date of last refill:03/20/19 with 1 addt refill # 540  [place]     Last office visit: 5/6/2019 Office & 05/14/2019 EMG  Due back to clinic per last office note:    Date next office visit ady elevated kappa and lamda light chains but normal ratio, which is likely related to chronic renal disease.       Otherwise, for treatment, continue gabapentin  600 mg 3 times daily; will continue      (R20.0,  R20.2) Numbness and tingling in left hand  (matthew

## 2019-09-11 ENCOUNTER — OFFICE VISIT (OUTPATIENT)
Dept: NEUROLOGY | Facility: CLINIC | Age: 59
End: 2019-09-11
Payer: COMMERCIAL

## 2019-09-11 VITALS
SYSTOLIC BLOOD PRESSURE: 125 MMHG | WEIGHT: 168 LBS | HEIGHT: 73 IN | HEART RATE: 74 BPM | DIASTOLIC BLOOD PRESSURE: 60 MMHG | RESPIRATION RATE: 16 BRPM | BODY MASS INDEX: 22.26 KG/M2

## 2019-09-11 DIAGNOSIS — E11.65 TYPE 2 DIABETES, UNCONTROLLED, WITH NEUROPATHY (HCC): ICD-10-CM

## 2019-09-11 DIAGNOSIS — E11.40 TYPE 2 DIABETES, UNCONTROLLED, WITH NEUROPATHY (HCC): ICD-10-CM

## 2019-09-11 DIAGNOSIS — R20.2 PARESTHESIAS: ICD-10-CM

## 2019-09-11 PROCEDURE — 99213 OFFICE O/P EST LOW 20 MIN: CPT | Performed by: OTHER

## 2019-09-11 RX ORDER — GABAPENTIN 600 MG/1
600 TABLET ORAL 2 TIMES DAILY
COMMUNITY
End: 2019-09-30

## 2019-09-11 RX ORDER — GABAPENTIN 300 MG/1
600 CAPSULE ORAL 2 TIMES DAILY
Qty: 360 CAPSULE | Refills: 1 | Status: SHIPPED | OUTPATIENT
Start: 2019-09-11 | End: 2019-09-11 | Stop reason: DRUGHIGH

## 2019-09-11 NOTE — PATIENT INSTRUCTIONS
Reduce gabapentin to 600 mg bid   Follow up in ~ 6 months     Refill policies:    • Allow 2-3 business days for refills; controlled substances may take longer.   • Contact your pharmacy at least 5 days prior to running out of medication and have them send a notified you that the test has been approved by your insurer. Depending on your insurance carrier, approval may take 3-10 days. It is highly recommended patients contact their insurance carrier directly to determine coverage.   If test is done without insu

## 2019-09-11 NOTE — PROGRESS NOTES
[facility] Progress Note    HPI    Follow-up: Neuropathy, dizziness    As per my initial note, 12/31/15:  Jordyn Randall is a 61year old man, with past medical history significant for multiple medical problems, including hypertension, He continues to have intermittent numbness in left side of face but this is transient. He also had chest tightness 7/2019 and had abnormal stress test and had angiogram done which showed stenosis and had angioplasty at [facility].   He followed u rt foot   • Pneumonia due to organism    • Primary male hypogonadism 4/16/2013    Dx in 4/2013   • Psychiatric disorder    • Renal cancer ([facility] [address] 12/27/2010    Right nephrectomy in 2010    • RENAL DISEASE    • Renal disorder    • S/p nephrectomy 12/27/2012 Performed by Rita Varela MD at [address]  in [address] Inguinal   • HERNIA SURGERY  6-25-12 [facility]    Lap Repair Ventral Hernia w/mesh & Lysis of Adhesions   • KNEE ARTHROSCOPY  03/2019    RT   • KNEE ARTHROSCOPY  05/2019    LT   • Types: Cigarettes        Quit date: 1987        Years since quittin.7      Smokeless tobacco: Former User      Tobacco comment: started     Substance and Sexual Activity      Alcohol use: No        Alcohol/week: 0.0 standard drinks •  CLOPIDOGREL BISULFATE 75 MG Oral Tab, TAKE 1 TABLET BY MOUTH EVERY DAY, Disp: 90 tablet, Rfl: 0  •  Albuterol Sulfate  (90 Base) MCG/ACT Inhalation Aero Soln, Inhale 2 puffs into the lungs every 4 (four) hours as needed for Wheezing or Shortness /60 (BP Location: Left arm, Patient Position: Sitting, Cuff Size: large)   Pulse 74   Resp 16   Ht 73\"   Wt 168 lb   BMI 22.16 kg/m²   Estimated body mass index is 22.16 kg/m² as calculated from the following:    Height as of this encounter: 73\". No apparent monoclonal protein on serum electrophoresis. . . . IMMUNOFIXATION       No monoclonal protein detected by immunofixation. . . .    KAPPA FREE LIGHT CHAIN      0.330 - 1.940 mg/dL 4.289 (H)   LAMBDA FREE LIGHT CHAIN      0.571 - 2.630 mg/d 13.0 - 17.5 g/dL 12.9 (L)   Hematocrit      39.0 - 53.0 % 40.3   Platelet Count      239.6 - 450.0 10(3)uL 200.0   MCV      80.0 - 100.0 fL 84.3   MCH      26.0 - 34.0 pg 27.0   MCHC      31.0 - 37.0 g/dL 32.0   RDW      11.0 - 15.0 % 13.8   RDW-SD GAMMA GLOBULIN      0.60-1.60 g/dL 0.85   A/G RATIO       1.56   PROTEIN ELECT INTERPRETATION       No apparent monoclonal protein on serum electrophoresis. . . . IMMUNOFIXATION       No monoclonal protein detected by immunofixation. . . .    KAPPA FREE L CRANIOCERVICAL AREA:  Normal foramen magnum with no Chiari malformation. PARASPINAL AREA:  Normal with no visible mass. No abnormal enhancement. BONY STRUCTURES:  No acute osseous abnormalities. Normal alignment of the cervical spine.   No lytic, margarita The visualized portion of the spinal cord is of normal caliber without focal signal abnormality. T12-L1:  Broad-based disc bulge without spinal canal or neural foraminal stenosis.   L1-L2:  Minimal disc bulge without spinal canal or neural foraminal stenos Wrist Dig II 3.02 4.17 8.7 8.5 Wrist - Dig II 13   43   L Median - Digit II (Antidromic)      Wrist Dig II 3.18 3.96 13.7 14.8 Wrist - Dig II 14   44   R Ulnar - Digit V (Antidromic)      Wrist Dig V 3.18 3.80 3.1 1.8 Wrist - Dig V 11   35      B. Elbow L. Biceps brachii Musculocutaneous C5-C6 N None None None None N N N N   L. Triceps brachii Radial C6-C8 N None None None None N N N N   L. Extensor digitorum communis Radial C7-C8 N None None None None N N N N   L.  Flexor carpi radialis Median C6-C7 N Non 7. Right median motor response was abnormal due to mildly prolonged distal motor latency, with severely reduced amplitude and slowed conduction velocity; F wave response was prolonged.    8. Left median motor response was abnormal due to slowed conduction v Superimposed on this neuropathy is bilateral median nerve entrapment neuropathy, with R worse than L. In addition, there is suggestion for a superimposed cervical radiculopathy (~C8) - clinical correlation and imaging review is advised.        Prior as sensorimotor, length dependent polyneuropathy, with ongoing   distal denervation in the lower extremity. Clinical comment:   Although there are some demyelinating features, diagnostic   criteria for a primary demyelinating neuropathy are not met.  In [facility] Ab panel done previously and unremarkable, folate, B12, RPR, sed rate, CRP, NOEMI, TSH and monoclonal protein study normal aside from minimally elevated sed rate and elevated kappa and lamda light chains but normal ratio, which is likely related

## 2019-09-22 ENCOUNTER — APPOINTMENT (OUTPATIENT)
Dept: ULTRASOUND IMAGING | Facility: HOSPITAL | Age: 59
End: 2019-09-22
Attending: HOSPITALIST
Payer: COMMERCIAL

## 2019-09-22 ENCOUNTER — HOSPITAL ENCOUNTER (OUTPATIENT)
Facility: HOSPITAL | Age: 59
Setting detail: OBSERVATION
Discharge: HOME | End: 2019-09-23
Attending: EMERGENCY MEDICINE | Admitting: HOSPITALIST
Payer: COMMERCIAL

## 2019-09-22 ENCOUNTER — APPOINTMENT (OUTPATIENT)
Dept: GENERAL RADIOLOGY | Facility: HOSPITAL | Age: 59
End: 2019-09-22
Payer: COMMERCIAL

## 2019-09-22 ENCOUNTER — APPOINTMENT (OUTPATIENT)
Dept: CT IMAGING | Facility: HOSPITAL | Age: 59
End: 2019-09-22
Attending: EMERGENCY MEDICINE
Payer: COMMERCIAL

## 2019-09-22 DIAGNOSIS — E11.65 UNCONTROLLED TYPE 2 DIABETES MELLITUS WITH HYPERGLYCEMIA (HCC): ICD-10-CM

## 2019-09-22 DIAGNOSIS — I25.10 CORONARY ARTERY DISEASE INVOLVING NATIVE HEART WITHOUT ANGINA PECTORIS, UNSPECIFIED VESSEL OR LESION TYPE: ICD-10-CM

## 2019-09-22 DIAGNOSIS — R55 SYNCOPE, NEAR: Primary | ICD-10-CM

## 2019-09-22 LAB
ALBUMIN SERPL-MCNC: 3.5 G/DL (ref 3.4–5)
ALBUMIN/GLOB SERPL: 0.9 {RATIO} (ref 1–2)
ALP LIVER SERPL-CCNC: 66 U/L (ref 45–117)
ALT SERPL-CCNC: 41 U/L (ref 16–61)
ANION GAP SERPL CALC-SCNC: 7 MMOL/L (ref 0–18)
AST SERPL-CCNC: 24 U/L (ref 15–37)
ATRIAL RATE: 73 BPM
BASOPHILS # BLD AUTO: 0.03 X10(3) UL (ref 0–0.2)
BASOPHILS NFR BLD AUTO: 0.4 %
BILIRUB SERPL-MCNC: 0.4 MG/DL (ref 0.1–2)
BUN BLD-MCNC: 36 MG/DL (ref 7–18)
BUN/CREAT SERPL: 17.1 (ref 10–20)
CALCIUM BLD-MCNC: 9.4 MG/DL (ref 8.5–10.1)
CHLORIDE SERPL-SCNC: 104 MMOL/L (ref 98–112)
CO2 SERPL-SCNC: 25 MMOL/L (ref 21–32)
CREAT BLD-MCNC: 2.11 MG/DL (ref 0.7–1.3)
D-DIMER: 0.36 UG/ML FEU (ref ?–0.59)
DEPRECATED RDW RBC AUTO: 44.8 FL (ref 35.1–46.3)
EOSINOPHIL # BLD AUTO: 0.19 X10(3) UL (ref 0–0.7)
EOSINOPHIL NFR BLD AUTO: 2.3 %
ERYTHROCYTE [DISTWIDTH] IN BLOOD BY AUTOMATED COUNT: 14.6 % (ref 11–15)
GLOBULIN PLAS-MCNC: 4.1 G/DL (ref 2.8–4.4)
GLUCOSE BLD-MCNC: 114 MG/DL (ref 70–99)
GLUCOSE BLD-MCNC: 193 MG/DL (ref 70–99)
GLUCOSE BLD-MCNC: 260 MG/DL (ref 70–99)
HAV IGM SER QL: 2 MG/DL (ref 1.6–2.6)
HCT VFR BLD AUTO: 40.9 % (ref 39–53)
HGB BLD-MCNC: 12.9 G/DL (ref 13–17.5)
IMM GRANULOCYTES # BLD AUTO: 0.04 X10(3) UL (ref 0–1)
IMM GRANULOCYTES NFR BLD: 0.5 %
LYMPHOCYTES # BLD AUTO: 3.12 X10(3) UL (ref 1–4)
LYMPHOCYTES NFR BLD AUTO: 38 %
M PROTEIN MFR SERPL ELPH: 7.6 G/DL (ref 6.4–8.2)
MCH RBC QN AUTO: 26.9 PG (ref 26–34)
MCHC RBC AUTO-ENTMCNC: 31.5 G/DL (ref 31–37)
MCV RBC AUTO: 85.4 FL (ref 80–100)
MONOCYTES # BLD AUTO: 0.52 X10(3) UL (ref 0.1–1)
MONOCYTES NFR BLD AUTO: 6.3 %
NEUTROPHILS # BLD AUTO: 4.3 X10 (3) UL (ref 1.5–7.7)
NEUTROPHILS # BLD AUTO: 4.3 X10(3) UL (ref 1.5–7.7)
NEUTROPHILS NFR BLD AUTO: 52.5 %
OSMOLALITY SERPL CALC.SUM OF ELEC: 299 MOSM/KG (ref 275–295)
P AXIS: 11 DEGREES
P-R INTERVAL: 176 MS
PLATELET # BLD AUTO: 186 10(3)UL (ref 150–450)
POTASSIUM SERPL-SCNC: 4.5 MMOL/L (ref 3.5–5.1)
Q-T INTERVAL: 384 MS
QRS DURATION: 92 MS
QTC CALCULATION (BEZET): 423 MS
R AXIS: 19 DEGREES
RBC # BLD AUTO: 4.79 X10(6)UL (ref 4.3–5.7)
SODIUM SERPL-SCNC: 136 MMOL/L (ref 136–145)
T AXIS: 90 DEGREES
TROPONIN I SERPL-MCNC: <0.045 NG/ML (ref ?–0.04)
TROPONIN I SERPL-MCNC: <0.045 NG/ML (ref ?–0.04)
VENTRICULAR RATE: 73 BPM
WBC # BLD AUTO: 8.2 X10(3) UL (ref 4–11)

## 2019-09-22 PROCEDURE — 99285 EMERGENCY DEPT VISIT HI MDM: CPT

## 2019-09-22 PROCEDURE — 83735 ASSAY OF MAGNESIUM: CPT | Performed by: EMERGENCY MEDICINE

## 2019-09-22 PROCEDURE — 93005 ELECTROCARDIOGRAM TRACING: CPT

## 2019-09-22 PROCEDURE — 80053 COMPREHEN METABOLIC PANEL: CPT

## 2019-09-22 PROCEDURE — 70450 CT HEAD/BRAIN W/O DYE: CPT | Performed by: EMERGENCY MEDICINE

## 2019-09-22 PROCEDURE — 84484 ASSAY OF TROPONIN QUANT: CPT | Performed by: EMERGENCY MEDICINE

## 2019-09-22 PROCEDURE — 85379 FIBRIN DEGRADATION QUANT: CPT | Performed by: EMERGENCY MEDICINE

## 2019-09-22 PROCEDURE — 85025 COMPLETE CBC W/AUTO DIFF WBC: CPT | Performed by: EMERGENCY MEDICINE

## 2019-09-22 PROCEDURE — 80053 COMPREHEN METABOLIC PANEL: CPT | Performed by: EMERGENCY MEDICINE

## 2019-09-22 PROCEDURE — 84484 ASSAY OF TROPONIN QUANT: CPT | Performed by: HOSPITALIST

## 2019-09-22 PROCEDURE — 71045 X-RAY EXAM CHEST 1 VIEW: CPT | Performed by: EMERGENCY MEDICINE

## 2019-09-22 PROCEDURE — 93880 EXTRACRANIAL BILAT STUDY: CPT | Performed by: HOSPITALIST

## 2019-09-22 PROCEDURE — 93010 ELECTROCARDIOGRAM REPORT: CPT

## 2019-09-22 PROCEDURE — 85025 COMPLETE CBC W/AUTO DIFF WBC: CPT

## 2019-09-22 PROCEDURE — 84484 ASSAY OF TROPONIN QUANT: CPT

## 2019-09-22 PROCEDURE — 96360 HYDRATION IV INFUSION INIT: CPT

## 2019-09-22 PROCEDURE — 82962 GLUCOSE BLOOD TEST: CPT

## 2019-09-22 RX ORDER — CLOPIDOGREL BISULFATE 75 MG/1
75 TABLET ORAL
Status: DISCONTINUED | OUTPATIENT
Start: 2019-09-23 | End: 2019-09-23

## 2019-09-22 RX ORDER — MELATONIN
325 EVERY OTHER DAY
Status: DISCONTINUED | OUTPATIENT
Start: 2019-09-22 | End: 2019-09-23

## 2019-09-22 RX ORDER — ATORVASTATIN CALCIUM 80 MG/1
80 TABLET, FILM COATED ORAL NIGHTLY
Status: DISCONTINUED | OUTPATIENT
Start: 2019-09-22 | End: 2019-09-23

## 2019-09-22 RX ORDER — DOXEPIN HYDROCHLORIDE 50 MG/1
1 CAPSULE ORAL DAILY
Status: DISCONTINUED | OUTPATIENT
Start: 2019-09-23 | End: 2019-09-23

## 2019-09-22 RX ORDER — LOSARTAN POTASSIUM 50 MG/1
50 TABLET ORAL
Status: DISCONTINUED | OUTPATIENT
Start: 2019-09-22 | End: 2019-09-22

## 2019-09-22 RX ORDER — LOSARTAN POTASSIUM 25 MG/1
25 TABLET ORAL DAILY
Status: DISCONTINUED | OUTPATIENT
Start: 2019-09-23 | End: 2019-09-23

## 2019-09-22 RX ORDER — HEPARIN SODIUM 5000 [USP'U]/ML
5000 INJECTION, SOLUTION INTRAVENOUS; SUBCUTANEOUS EVERY 8 HOURS SCHEDULED
Status: DISCONTINUED | OUTPATIENT
Start: 2019-09-22 | End: 2019-09-23

## 2019-09-22 RX ORDER — SODIUM CHLORIDE 9 MG/ML
500 INJECTION, SOLUTION INTRAVENOUS ONCE
Status: COMPLETED | OUTPATIENT
Start: 2019-09-22 | End: 2019-09-22

## 2019-09-22 RX ORDER — CYCLOBENZAPRINE HCL 5 MG
5 TABLET ORAL 3 TIMES DAILY PRN
Status: DISCONTINUED | OUTPATIENT
Start: 2019-09-22 | End: 2019-09-23

## 2019-09-22 RX ORDER — AMITRIPTYLINE HYDROCHLORIDE 25 MG/1
25 TABLET, FILM COATED ORAL NIGHTLY
Status: DISCONTINUED | OUTPATIENT
Start: 2019-09-22 | End: 2019-09-23

## 2019-09-22 RX ORDER — MONTELUKAST SODIUM 10 MG/1
10 TABLET ORAL NIGHTLY
Status: DISCONTINUED | OUTPATIENT
Start: 2019-09-22 | End: 2019-09-23

## 2019-09-22 RX ORDER — FENOFIBRATE 134 MG/1
134 CAPSULE ORAL
Status: DISCONTINUED | OUTPATIENT
Start: 2019-09-23 | End: 2019-09-23

## 2019-09-22 RX ORDER — OMEGA-3-ACID ETHYL ESTERS 1 G/1
2 CAPSULE, LIQUID FILLED ORAL 2 TIMES DAILY
Status: DISCONTINUED | OUTPATIENT
Start: 2019-09-22 | End: 2019-09-23

## 2019-09-22 RX ORDER — IPRATROPIUM BROMIDE AND ALBUTEROL SULFATE 2.5; .5 MG/3ML; MG/3ML
3 SOLUTION RESPIRATORY (INHALATION) EVERY 6 HOURS PRN
Status: DISCONTINUED | OUTPATIENT
Start: 2019-09-22 | End: 2019-09-23

## 2019-09-22 RX ORDER — ASPIRIN 81 MG/1
81 TABLET, CHEWABLE ORAL 2 TIMES DAILY
Status: DISCONTINUED | OUTPATIENT
Start: 2019-09-22 | End: 2019-09-23

## 2019-09-22 RX ORDER — ALBUTEROL SULFATE 90 UG/1
2 AEROSOL, METERED RESPIRATORY (INHALATION) EVERY 4 HOURS PRN
Status: DISCONTINUED | OUTPATIENT
Start: 2019-09-22 | End: 2019-09-23

## 2019-09-22 RX ORDER — ATENOLOL 50 MG/1
50 TABLET ORAL DAILY
Status: DISCONTINUED | OUTPATIENT
Start: 2019-09-23 | End: 2019-09-22

## 2019-09-22 RX ORDER — ISOSORBIDE MONONITRATE 60 MG/1
60 TABLET, EXTENDED RELEASE ORAL DAILY
Status: DISCONTINUED | OUTPATIENT
Start: 2019-09-23 | End: 2019-09-22

## 2019-09-22 RX ORDER — DEXTROSE MONOHYDRATE 25 G/50ML
50 INJECTION, SOLUTION INTRAVENOUS
Status: DISCONTINUED | OUTPATIENT
Start: 2019-09-22 | End: 2019-09-23

## 2019-09-22 RX ORDER — GABAPENTIN 600 MG/1
600 TABLET ORAL 2 TIMES DAILY
Status: DISCONTINUED | OUTPATIENT
Start: 2019-09-22 | End: 2019-09-23

## 2019-09-22 RX ORDER — ALFUZOSIN HYDROCHLORIDE 10 MG/1
10 TABLET, EXTENDED RELEASE ORAL
Status: DISCONTINUED | OUTPATIENT
Start: 2019-09-23 | End: 2019-09-23

## 2019-09-22 NOTE — CONSULTS
[facility] Cardiology  Report of Consultation    Garcia Ortiz Patient Status:  Emergency    1960 MRN [id number]   Location [address] [facility] [address] Attending No att. providers found   Hosp Day # 0 PCP MD Ingris Fernandes unspecified    • Osteoarthrosis, unspecified whether generalized or localized, unspecified site     bilateral hips, bone spurs left hip   • Other and unspecified hyperlipidemia    • Personal history of other musculoskeletal disorders(V13.59) 1970's    frac MOUTH TWICE A DAY Disp: 360 capsule Rfl: 1   Insulin Pen Needle (BD PEN NEEDLE EMI U/F) 32G X 4 MM Does not apply Misc Use 4 per day Disp: 400 each Rfl: 3   Insulin Pen Needle (BD PEN NEEDLE MINI U/F) 31G X 5 MM Does not apply Misc Use 5 times a day with tablet Rfl: 3   Glucose Blood (ACCU-CHEK SHEBA PLUS) In Vitro Strip Test 5 times daily Disp: 500 strip Rfl: 1   Blood Glucose Monitoring Suppl (ACCU-CHEK SHEBA PLUS) w/Device Does not apply Kit Test 5 times daily Disp: 1 kit Rfl: 0   ACCU-CHEK SOFTCLIX CORINNA evidence of peripheral edema. No cyanosis or clubbing of the digits is appreciated. Femoral, Dorsalis Pedis, and Posterior Tibialis  pulses are 2+ and equal in a symmetric fashion. Neurologic: Alert and oriented, normal affect.   No gross deficit apprec

## 2019-09-22 NOTE — H&P
[facility] Hospitalist H&P       CC: Patient presents with:  Syncope (cardiovascular, neurologic)  Arrythmia/Palpitations (cardiovascular)       PCP: Oumar Gregory MD    History of Present Illness: Patient is a 61year old male with PMH sig for DM complicated by bilateral hips, bone spurs left hip   • Other and unspecified hyperlipidemia    • Personal history of other musculoskeletal disorders(V13.59) 1970's    fractured both elbows   • Personal history of other musculoskeletal disorders(V13.59) 1969    rt tonya Repeat 2022, needs MAC   • ESOPHAGOGASTRODUODENOSCOPY, POSSIBLE BIOPSY, POSSIBLE POLYPECTOMY 78673 N/A 8/23/2013    Performed by Jazmin Cortés MD at [address] [place] [address] N/A 10/8/2013    Performed by Pritesh Rowell spasms. Disp: 45 tablet Rfl: 5   Econazole Nitrate 1 % External Cream Rub into feet well twice daily for 2 weeks Disp: 85 g Rfl: 1   atenolol 50 MG Oral Tab Take 1 tablet (50 mg total) by mouth daily.  Disp: 99 tablet Rfl: 0   Amitriptyline HCl 25 MG Oral T Fumarate (SYMBICORT) 160-4.5 MCG/ACT Inhalation Aerosol Inhale 2 puffs into the lungs 2 (two) times daily as needed. Disp:  Rfl:    aspirin 81 MG Oral Tab Take 81 mg by mouth 2 (two) times daily.  Disp:  Rfl:    Multiple Vitamin (TAB-A-CRISTO) Oral Tab Take abnormality, atraumatic. Eyes:  Sclera anicteric, No conjunctival pallor, EOMs intact. Nose: Nares normal. Septum midline. Mucosa normal. No drainage.    Throat: Lips, mucosa, and tongue normal. Teeth and gums normal.   Neck: Supple, symmetrical, trach bones are unremarkable. Stable median sternotomy wires. CONCLUSION:  No acute disease.      Dictated by: Ramandeep Gonzalez MD on 9/22/2019 at 12:32     Approved by: Ramandeep Gonzalez MD             Groin, Bilateral(cpt=76881)    Result Date: 9/12/2019  DATE house    Patient and/or patient's family given opportunity to ask questions and note understanding and agreeing with therapeutic plan as outlined    Thank You,  Ravi Shelley DO  [facility] Hospitalist  Pager [phone]  Answering Service number: [phone]

## 2019-09-22 NOTE — ED PROVIDER NOTES
Patient Seen in: [facility] Emergency Department      History   Patient presents with:  Syncope (cardiovascular, neurologic)  Arrythmia/Palpitations (cardiovascular)    Stated Complaint: palpitations w/ syncopal event;  \"tingling in head now\"    H hyperlipidemia    • Personal history of other musculoskeletal disorders(V13.59) 1970's    fractured both elbows   • Personal history of other musculoskeletal disorders(V13.59) 1969    rt foot   • Pneumonia due to organism    • Primary male hypogonadism 4/1 BIOPSY, POSSIBLE POLYPECTOMY 60662 N/A 8/23/2013    Performed by Nelly Galloway MD at [address] [place] [address] GRAFT N/A 10/8/2013    Performed by Amy Pichardo MD at [address] [place] [address]   • Oropharynx is normal. Neck: No adenopathy or thyromegaly. Lungs are clear to auscultation. Heart exam: Normal S1-S2 without extra sounds or murmurs. Regular rate and rhythm. Chest wall is nontender.   Abdomen is soft and nontender without masses or rebo enlargement. Borderline EKG. Agree with EKG report. Ct Brain Or Head (07456)    Result Date: 9/22/2019  CONCLUSION:  No acute intracranial abnormality is seen. If clinical symptoms persist then recommend MRI.    Dictated by: Sheyla Cardona MD on

## 2019-09-22 NOTE — ED NOTES
Spoke with dr. Colt John regarding pt low blood pressure, elevated creatnine and elevated blood sugar.  VORB to give normal saline 0.9 iv at wide open

## 2019-09-22 NOTE — PROGRESS NOTES
recvd pt from ER   Pt a & o x 4 low bp metoprolol held  nsr on tele   No complaints of pain   Pt attempted to cut his own toe nails prior to admission and has a cut on his left great toe and right second toe   bandaid and bacitracin applied   Plan for pt h

## 2019-09-22 NOTE — ED INITIAL ASSESSMENT (HPI)
Pt was at [employer] when he got up he started feeling a flutter in his chest and then got dizzy and then everything went black and he sat back down.

## 2019-09-23 ENCOUNTER — APPOINTMENT (OUTPATIENT)
Dept: CV DIAGNOSTICS | Facility: HOSPITAL | Age: 59
End: 2019-09-23
Attending: INTERNAL MEDICINE
Payer: COMMERCIAL

## 2019-09-23 ENCOUNTER — APPOINTMENT (OUTPATIENT)
Dept: MRI IMAGING | Facility: HOSPITAL | Age: 59
End: 2019-09-23
Attending: HOSPITALIST
Payer: COMMERCIAL

## 2019-09-23 VITALS
DIASTOLIC BLOOD PRESSURE: 80 MMHG | RESPIRATION RATE: 15 BRPM | WEIGHT: 264.31 LBS | TEMPERATURE: 98 F | SYSTOLIC BLOOD PRESSURE: 125 MMHG | HEART RATE: 76 BPM | HEIGHT: 73 IN | BODY MASS INDEX: 35.03 KG/M2 | OXYGEN SATURATION: 97 %

## 2019-09-23 LAB
ANION GAP SERPL CALC-SCNC: 8 MMOL/L (ref 0–18)
BASOPHILS # BLD AUTO: 0.04 X10(3) UL (ref 0–0.2)
BASOPHILS NFR BLD AUTO: 0.5 %
BUN BLD-MCNC: 42 MG/DL (ref 7–18)
BUN/CREAT SERPL: 20 (ref 10–20)
CALCIUM BLD-MCNC: 8.4 MG/DL (ref 8.5–10.1)
CHLORIDE SERPL-SCNC: 104 MMOL/L (ref 98–112)
CO2 SERPL-SCNC: 23 MMOL/L (ref 21–32)
CREAT BLD-MCNC: 2.1 MG/DL (ref 0.7–1.3)
DEPRECATED RDW RBC AUTO: 46.7 FL (ref 35.1–46.3)
EOSINOPHIL # BLD AUTO: 0.21 X10(3) UL (ref 0–0.7)
EOSINOPHIL NFR BLD AUTO: 2.7 %
ERYTHROCYTE [DISTWIDTH] IN BLOOD BY AUTOMATED COUNT: 14.6 % (ref 11–15)
GLUCOSE BLD-MCNC: 125 MG/DL (ref 70–99)
GLUCOSE BLD-MCNC: 154 MG/DL (ref 70–99)
GLUCOSE BLD-MCNC: 156 MG/DL (ref 70–99)
HCT VFR BLD AUTO: 38.9 % (ref 39–53)
HGB BLD-MCNC: 11.9 G/DL (ref 13–17.5)
IMM GRANULOCYTES # BLD AUTO: 0.03 X10(3) UL (ref 0–1)
IMM GRANULOCYTES NFR BLD: 0.4 %
LYMPHOCYTES # BLD AUTO: 3.88 X10(3) UL (ref 1–4)
LYMPHOCYTES NFR BLD AUTO: 50.7 %
MCH RBC QN AUTO: 26.6 PG (ref 26–34)
MCHC RBC AUTO-ENTMCNC: 30.6 G/DL (ref 31–37)
MCV RBC AUTO: 87 FL (ref 80–100)
MONOCYTES # BLD AUTO: 0.42 X10(3) UL (ref 0.1–1)
MONOCYTES NFR BLD AUTO: 5.5 %
NEUTROPHILS # BLD AUTO: 3.07 X10 (3) UL (ref 1.5–7.7)
NEUTROPHILS # BLD AUTO: 3.07 X10(3) UL (ref 1.5–7.7)
NEUTROPHILS NFR BLD AUTO: 40.2 %
OSMOLALITY SERPL CALC.SUM OF ELEC: 292 MOSM/KG (ref 275–295)
PLATELET # BLD AUTO: 167 10(3)UL (ref 150–450)
POTASSIUM SERPL-SCNC: 4 MMOL/L (ref 3.5–5.1)
RBC # BLD AUTO: 4.47 X10(6)UL (ref 4.3–5.7)
SODIUM SERPL-SCNC: 135 MMOL/L (ref 136–145)
TROPONIN I SERPL-MCNC: <0.045 NG/ML (ref ?–0.04)
WBC # BLD AUTO: 7.7 X10(3) UL (ref 4–11)

## 2019-09-23 PROCEDURE — 82962 GLUCOSE BLOOD TEST: CPT

## 2019-09-23 PROCEDURE — 80048 BASIC METABOLIC PNL TOTAL CA: CPT | Performed by: HOSPITALIST

## 2019-09-23 PROCEDURE — 93306 TTE W/DOPPLER COMPLETE: CPT | Performed by: INTERNAL MEDICINE

## 2019-09-23 PROCEDURE — 94660 CPAP INITIATION&MGMT: CPT

## 2019-09-23 PROCEDURE — 85025 COMPLETE CBC W/AUTO DIFF WBC: CPT | Performed by: HOSPITALIST

## 2019-09-23 PROCEDURE — 70551 MRI BRAIN STEM W/O DYE: CPT | Performed by: HOSPITALIST

## 2019-09-23 PROCEDURE — 36415 COLL VENOUS BLD VENIPUNCTURE: CPT | Performed by: HOSPITALIST

## 2019-09-23 PROCEDURE — 84484 ASSAY OF TROPONIN QUANT: CPT | Performed by: HOSPITALIST

## 2019-09-23 RX ORDER — LOSARTAN POTASSIUM 25 MG/1
25 TABLET ORAL DAILY
Qty: 30 TABLET | Refills: 11 | Status: SHIPPED | OUTPATIENT
Start: 2019-09-24 | End: 2019-11-14

## 2019-09-23 NOTE — PROGRESS NOTES
09/23/19 0524 09/23/19 0526 09/23/19 0528   Vital Signs   Pulse 68 72 70   Resp 17 21 18   /51 114/53 112/51   BP Location Left arm Left arm Left arm   BP Method Automatic Automatic Automatic   Patient Position Lying Sitting Standing   Pt denies d

## 2019-09-23 NOTE — PLAN OF CARE
Stable for discharge from cardiology perspective. Needs to follow up in office in 2 weeks with Santos. Continue with current BP medications, which are much decreased from admission.

## 2019-09-23 NOTE — PROGRESS NOTES
[facility] Cardiology  Progress Note    Henrique Acosta Patient Status:  Observation    1960 MRN [id number]   [facility] 7NE-A Attending Srikanth Shin,    Hosp Day # 0 PCP Norvel Lefort, MD     Subjective:   Feels better afte **OR** Glucose-Vitamin C    Intake/Output:     Intake/Output Summary (Last 24 hours) at 9/23/2019 0929  Last data filed at 9/23/2019 0730  Gross per 24 hour   Intake 660 ml   Output 1400 ml   Net -740 ml       Wt Readings from Last 3 Encounters:  09/22/19 hours. Recent Labs   Lab 09/22/19  1153 09/22/19 2024 09/23/19  0005   TROP <0.045 <0.045 <0.045         Tele: SR      Assessment:    1. Near syncope               -Orthostatic character.   Not orthostatic after IV fluid and less Rx this AM.

## 2019-09-23 NOTE — PLAN OF CARE
Patient A/Ox4 pleasant and cooperative with care and staff  Room air during the day, no s/s of respiratory distress  Denies any headache or lightheadedness  Denies pain  Call light within reach. Needs voiced and attended to. Will continue to monitor. electrolyte imbalances  - Administer electrolyte replacement as ordered  - Monitor response to electrolyte replacements, including rhythm and repeat lab results as appropriate  - Fluid restriction as ordered  - Instruct patient on fluid and nutrition restr

## 2019-09-23 NOTE — PLAN OF CARE
NURSING DISCHARGE NOTE    Discharged Home via Wheelchair.   Accompanied by Spouse  Belongings Taken by patient/family including patient's CPAP mask      Patient educated on medications, s/s to monitor for, follow up appointments, and when to call 911  Q needed  - Instruct patient on self management of diabetes  9/23/2019 1721 by Gena Perdue, RN  Outcome: Adequate for Discharge  9/23/2019 1037 by Gena Perdue RN  Outcome: Progressing  Goal: Electrolytes maintained within normal limits  Amara Connolly

## 2019-09-23 NOTE — RESPIRATORY THERAPY NOTE
RHETT Equipment Usage Summary :            Set Mode : CPAP W FLEX          Usage in Hours:5;41          90% Pressure (EPAP) : 11           90% Insp Pressure (IPAP);           AHI : 15.7          Supplemental Oxygen :      LPM

## 2019-10-09 ENCOUNTER — APPOINTMENT (OUTPATIENT)
Dept: ULTRASOUND IMAGING | Facility: HOSPITAL | Age: 59
End: 2019-10-09
Attending: EMERGENCY MEDICINE
Payer: COMMERCIAL

## 2019-10-09 ENCOUNTER — HOSPITAL ENCOUNTER (EMERGENCY)
Facility: HOSPITAL | Age: 59
Discharge: HOME | End: 2019-10-09
Attending: EMERGENCY MEDICINE
Payer: COMMERCIAL

## 2019-10-09 ENCOUNTER — APPOINTMENT (OUTPATIENT)
Dept: GENERAL RADIOLOGY | Facility: HOSPITAL | Age: 59
End: 2019-10-09
Attending: EMERGENCY MEDICINE
Payer: COMMERCIAL

## 2019-10-09 VITALS
HEART RATE: 89 BPM | DIASTOLIC BLOOD PRESSURE: 76 MMHG | BODY MASS INDEX: 35.12 KG/M2 | SYSTOLIC BLOOD PRESSURE: 150 MMHG | TEMPERATURE: 98 F | HEIGHT: 73 IN | RESPIRATION RATE: 16 BRPM | OXYGEN SATURATION: 99 % | WEIGHT: 265 LBS

## 2019-10-09 DIAGNOSIS — L03.116 CELLULITIS OF LEFT LOWER EXTREMITY: Primary | ICD-10-CM

## 2019-10-09 DIAGNOSIS — M25.472 EFFUSION OF LEFT ANKLE: ICD-10-CM

## 2019-10-09 PROCEDURE — 99284 EMERGENCY DEPT VISIT MOD MDM: CPT

## 2019-10-09 PROCEDURE — 73610 X-RAY EXAM OF ANKLE: CPT | Performed by: EMERGENCY MEDICINE

## 2019-10-09 PROCEDURE — 93971 EXTREMITY STUDY: CPT | Performed by: EMERGENCY MEDICINE

## 2019-10-09 RX ORDER — CLINDAMYCIN HYDROCHLORIDE 300 MG/1
300 CAPSULE ORAL 3 TIMES DAILY
Qty: 30 CAPSULE | Refills: 0 | Status: SHIPPED | OUTPATIENT
Start: 2019-10-09 | End: 2019-10-19

## 2019-10-09 NOTE — ED INITIAL ASSESSMENT (HPI)
Patient presents with c/o pain in the left knee, left foot, and up the posterior lower leg. He went to immediate care who sent him to the ED for DVT workup.

## 2019-10-10 NOTE — ED PROVIDER NOTES
Patient Seen in: [facility] Emergency Department      History   Patient presents with:  Deep Vein Thrombosis (cardiovascular)    Stated Complaint: r/o DVT     HPI    Patient is a 49-year-old male with multiple past medical problems, including diabet unspecified hyperlipidemia    • Personal history of other musculoskeletal disorders(V13.59) 1970's    fractured both elbows   • Personal history of other musculoskeletal disorders(V13.59) 1969    rt foot   • Pneumonia due to organism    • Primary male hypo ESOPHAGOGASTRODUODENOSCOPY, POSSIBLE BIOPSY, POSSIBLE POLYPECTOMY 49231 N/A 8/23/2013    Performed by Juan Antonio Del Toro MD at [facility] N/A 10/8/2013    Performed by Mandeep Remy MD at [facility] strength and sensation proximally and distally throughout 4 extremities. HEENT: No lymphadenopathy.  Mucus membranes moist.   Supple neck without any meningismus or rigidity  Cardiovascular:    Regular rhythm without murmurs rubs or gallops, no peripheral posterior side of ankle. FINDINGS:  There is a small ankle joint effusion present. A plantar     calcaneal spur is noted. Calcaneal enthesophyte noted. Degenerative     changes present at the distal tibial subchondral cyst formation.   An negative for DVT. X-ray shows joint effusion. I believe this may be due to inflammatory changes from his overuse this past weekend. It did happen after he walked on it quite a bit. The joint is not hot to the touch.   I do not believe he has any eviden

## 2019-10-21 ENCOUNTER — HOSPITAL ENCOUNTER (OUTPATIENT)
Dept: INTERVENTIONAL RADIOLOGY/VASCULAR | Facility: HOSPITAL | Age: 59
Discharge: HOME | End: 2019-10-22
Attending: INTERNAL MEDICINE | Admitting: INTERNAL MEDICINE
Payer: COMMERCIAL

## 2019-10-21 DIAGNOSIS — I25.10 CAD (CORONARY ARTERY DISEASE): ICD-10-CM

## 2019-10-21 PROCEDURE — 94660 CPAP INITIATION&MGMT: CPT

## 2019-10-21 PROCEDURE — 3E033PZ INTRODUCTION OF PLATELET INHIBITOR INTO PERIPHERAL VEIN, PERCUTANEOUS APPROACH: ICD-10-PCS | Performed by: INTERNAL MEDICINE

## 2019-10-21 PROCEDURE — 99152 MOD SED SAME PHYS/QHP 5/>YRS: CPT

## 2019-10-21 PROCEDURE — 93005 ELECTROCARDIOGRAM TRACING: CPT

## 2019-10-21 PROCEDURE — 99211 OFF/OP EST MAY X REQ PHY/QHP: CPT

## 2019-10-21 PROCEDURE — 82962 GLUCOSE BLOOD TEST: CPT

## 2019-10-21 PROCEDURE — 93010 ELECTROCARDIOGRAM REPORT: CPT | Performed by: INTERNAL MEDICINE

## 2019-10-21 PROCEDURE — 99153 MOD SED SAME PHYS/QHP EA: CPT

## 2019-10-21 PROCEDURE — 027035Z DILATION OF CORONARY ARTERY, ONE ARTERY WITH TWO DRUG-ELUTING INTRALUMINAL DEVICES, PERCUTANEOUS APPROACH: ICD-10-PCS | Performed by: INTERNAL MEDICINE

## 2019-10-21 RX ORDER — SODIUM CHLORIDE 9 MG/ML
INJECTION, SOLUTION INTRAVENOUS
Status: DISCONTINUED | OUTPATIENT
Start: 2019-10-22 | End: 2019-10-21 | Stop reason: HOSPADM

## 2019-10-21 RX ORDER — MIDAZOLAM HYDROCHLORIDE 1 MG/ML
INJECTION INTRAMUSCULAR; INTRAVENOUS
Status: COMPLETED
Start: 2019-10-21 | End: 2019-10-21

## 2019-10-21 RX ORDER — ALFUZOSIN HYDROCHLORIDE 10 MG/1
10 TABLET, EXTENDED RELEASE ORAL
Status: DISCONTINUED | OUTPATIENT
Start: 2019-10-22 | End: 2019-10-22

## 2019-10-21 RX ORDER — LOSARTAN POTASSIUM 25 MG/1
25 TABLET ORAL DAILY
Status: DISCONTINUED | OUTPATIENT
Start: 2019-10-21 | End: 2019-10-22

## 2019-10-21 RX ORDER — ALBUTEROL SULFATE 90 UG/1
2 AEROSOL, METERED RESPIRATORY (INHALATION) EVERY 4 HOURS PRN
Status: DISCONTINUED | OUTPATIENT
Start: 2019-10-21 | End: 2019-10-22

## 2019-10-21 RX ORDER — MONTELUKAST SODIUM 10 MG/1
10 TABLET ORAL NIGHTLY
Status: DISCONTINUED | OUTPATIENT
Start: 2019-10-21 | End: 2019-10-22

## 2019-10-21 RX ORDER — GABAPENTIN 300 MG/1
300 CAPSULE ORAL NIGHTLY
Status: DISCONTINUED | OUTPATIENT
Start: 2019-10-21 | End: 2019-10-22

## 2019-10-21 RX ORDER — ACETAMINOPHEN 325 MG/1
650 TABLET ORAL EVERY 6 HOURS PRN
Status: DISCONTINUED | OUTPATIENT
Start: 2019-10-21 | End: 2019-10-22

## 2019-10-21 RX ORDER — FENOFIBRATE 134 MG/1
134 CAPSULE ORAL
Status: DISCONTINUED | OUTPATIENT
Start: 2019-10-22 | End: 2019-10-22

## 2019-10-21 RX ORDER — FLUTICASONE PROPIONATE 50 MCG
2 SPRAY, SUSPENSION (ML) NASAL DAILY PRN
Status: DISCONTINUED | OUTPATIENT
Start: 2019-10-21 | End: 2019-10-22

## 2019-10-21 RX ORDER — ASPIRIN 81 MG/1
81 TABLET, CHEWABLE ORAL DAILY
Status: DISCONTINUED | OUTPATIENT
Start: 2019-10-21 | End: 2019-10-21 | Stop reason: SDUPTHER

## 2019-10-21 RX ORDER — GABAPENTIN 300 MG/1
600 CAPSULE ORAL EVERY MORNING
Status: DISCONTINUED | OUTPATIENT
Start: 2019-10-21 | End: 2019-10-22

## 2019-10-21 RX ORDER — BIVALIRUDIN 250 MG/5ML
INJECTION, POWDER, LYOPHILIZED, FOR SOLUTION INTRAVENOUS
Status: COMPLETED
Start: 2019-10-21 | End: 2019-10-21

## 2019-10-21 RX ORDER — HEPARIN SODIUM 5000 [USP'U]/ML
INJECTION, SOLUTION INTRAVENOUS; SUBCUTANEOUS
Status: COMPLETED
Start: 2019-10-21 | End: 2019-10-21

## 2019-10-21 RX ORDER — SODIUM CHLORIDE 9 MG/ML
INJECTION, SOLUTION INTRAVENOUS CONTINUOUS
Status: ACTIVE | OUTPATIENT
Start: 2019-10-21 | End: 2019-10-22

## 2019-10-21 RX ORDER — AMITRIPTYLINE HYDROCHLORIDE 25 MG/1
25 TABLET, FILM COATED ORAL NIGHTLY
Status: DISCONTINUED | OUTPATIENT
Start: 2019-10-21 | End: 2019-10-22

## 2019-10-21 RX ORDER — GABAPENTIN 300 MG/1
300 CAPSULE ORAL
Status: DISCONTINUED | OUTPATIENT
Start: 2019-10-21 | End: 2019-10-22

## 2019-10-21 RX ORDER — CLOPIDOGREL BISULFATE 75 MG/1
TABLET ORAL
Status: COMPLETED
Start: 2019-10-21 | End: 2019-10-21

## 2019-10-21 RX ORDER — CLOPIDOGREL BISULFATE 75 MG/1
75 TABLET ORAL DAILY
Status: DISCONTINUED | OUTPATIENT
Start: 2019-10-22 | End: 2019-10-22

## 2019-10-21 RX ORDER — METOPROLOL SUCCINATE 25 MG/1
25 TABLET, EXTENDED RELEASE ORAL
Status: DISCONTINUED | OUTPATIENT
Start: 2019-10-21 | End: 2019-10-22

## 2019-10-21 RX ORDER — ATORVASTATIN CALCIUM 20 MG/1
20 TABLET, FILM COATED ORAL NIGHTLY
Status: DISCONTINUED | OUTPATIENT
Start: 2019-10-21 | End: 2019-10-22

## 2019-10-21 RX ORDER — ASPIRIN 81 MG/1
81 TABLET ORAL DAILY
Status: DISCONTINUED | OUTPATIENT
Start: 2019-10-22 | End: 2019-10-22

## 2019-10-21 RX ORDER — CYCLOBENZAPRINE HCL 5 MG
5 TABLET ORAL 3 TIMES DAILY PRN
Status: DISCONTINUED | OUTPATIENT
Start: 2019-10-21 | End: 2019-10-22

## 2019-10-21 RX ORDER — SODIUM CHLORIDE 9 MG/ML
INJECTION, SOLUTION INTRAVENOUS CONTINUOUS
Status: ACTIVE | OUTPATIENT
Start: 2019-10-21 | End: 2019-10-21

## 2019-10-21 RX ORDER — MELATONIN
325 EVERY OTHER DAY
Status: DISCONTINUED | OUTPATIENT
Start: 2019-10-22 | End: 2019-10-22

## 2019-10-21 RX ORDER — LIDOCAINE HYDROCHLORIDE 10 MG/ML
INJECTION, SOLUTION EPIDURAL; INFILTRATION; INTRACAUDAL; PERINEURAL
Status: COMPLETED
Start: 2019-10-21 | End: 2019-10-21

## 2019-10-21 RX ADMIN — ACETAMINOPHEN 650 MG: 325 TABLET ORAL at 11:56:00

## 2019-10-21 RX ADMIN — SODIUM CHLORIDE: 9 INJECTION, SOLUTION INTRAVENOUS at 09:45:00

## 2019-10-21 RX ADMIN — GABAPENTIN 600 MG: 300 CAPSULE ORAL at 17:23:00

## 2019-10-21 RX ADMIN — ATORVASTATIN CALCIUM 20 MG: 20 TABLET, FILM COATED ORAL at 21:10:00

## 2019-10-21 RX ADMIN — SODIUM CHLORIDE: 9 INJECTION, SOLUTION INTRAVENOUS at 07:15:00

## 2019-10-21 RX ADMIN — AMITRIPTYLINE HYDROCHLORIDE 25 MG: 25 TABLET, FILM COATED ORAL at 21:09:00

## 2019-10-21 RX ADMIN — GABAPENTIN 300 MG: 300 CAPSULE ORAL at 21:09:00

## 2019-10-21 RX ADMIN — MONTELUKAST SODIUM 10 MG: 10 TABLET ORAL at 21:09:00

## 2019-10-21 NOTE — CONSULTS
Nutrition  Dietitian consult received per cardiac rehab standing order. Pt to be educated by cardiac rehab staff and encouraged to attend outpatient classes taught by RD. RD available PRN.      Samira Hawley MS, RD, LDN  Pager # [phone]

## 2019-10-21 NOTE — PLAN OF CARE
Patient Education     Shoulder Sprain  A sprain is a stretching or tearing of the ligaments that hold a joint together. A sprain may take up to 8 weeks to fully heal, depending on how severe it is. Moderate to severe shoulder sprains are treated with a sling or shoulder immobilizer. Minor sprains can be treated without any special support.  Home care  The following guidelines will help you care for your injury at home:    If a sling was given to you, leave it in place for the time advised by your healthcare provider. If you aren t sure how long to wear it, ask for advice. If the sling becomes loose, adjust it so that your forearm is level with the ground. Your shoulder should feel well supported.    Put an ice pack on the injured area for 20 minutes every 1 to 2 hours the first day. You can make your own ice pack by putting ice cubes in a plastic bag. A bag of frozen peas or something similar works well too. Wrap the bag in a thin towel. Continue with ice packs 3 to 4 times a day for the next 2 to 3 days. Then use the pack as needed to ease pain and swelling.    You may use acetaminophen or ibuprofen to control pain, unless another pain medicine was prescribed. If you have chronic liver or kidney disease, talk with your healthcare provider before using these medicines. Also talk with your provider if you ve had a stomach ulcer or gastrointestinal bleeding.    Shoulder joints become stiff if left in a sling for too long. You should start range of motion exercises about 7 to 10 days after the injury. Talk with your provider to find out what type of exercises to do and how soon to start.  Follow-up care  Follow up with your healthcare provider, or as advised.  Any X-rays you had today don t show any broken bones, breaks, or fractures. Sometimes fractures don t show up on the first X-ray. Bruises and sprains can sometimes hurt as much as a fracture. These injuries can take time to heal completely. If your symptoms  NURSING ADMISSION NOTE      Patient admitted via Cart  Oriented to room. Safety precautions initiated. Bed in low position. Call light in reach.     3308 Admitted from cath lab s/p PCI/stented x2 RCA  Right groin perclose d/I,no bleeding,no hematoma don t improve or they get worse, talk with your provider. You may need a repeat X-ray or other treatments.  When to seek medical advice  Call your healthcare provider right away if any of these occur:    Shoulder pain or swelling in your arm that gets worse    Fingers become cold, blue, numb, or tingly    Large amount of bruising of the shoulder or upper arm    Fever or chills  Date Last Reviewed: 8/1/2016 2000-2018 [employer]. [address] [place] [address], [place] [id number]. All rights reserved. This information is not intended as a substitute for professional medical care. Always follow your healthcare professional's instructions.            weights  Outcome: Progressing  Goal: Absence of cardiac arrhythmias or at baseline  Description  INTERVENTIONS:  - Continuous cardiac monitoring, monitor vital signs, obtain 12 lead EKG if indicated  - Evaluate effectiveness of antiarrhythmic and heart rat

## 2019-10-21 NOTE — PROCEDURES
[facility]    PATIENT'S NAME: Lupillo Trinidad   ATTENDING PHYSICIAN: Sullivan Frankel, M.D. OPERATING PHYSICIAN: Sullivan Frankel, M.D.    PATIENT ACCOUNT#:   [de-identified]    LOCATION:  [facility] 2 EDWP 10  MEDICAL RECORD #:   [id number] was improved from 70% to 0% with no dissection and with good runoff. The SREEDHAR flow was SREEDHAR 3 in both lesions before the procedure and SREEDHAR 3 in both lesions after the procedure.   At the end of the procedure, the right femoral artery puncture site was nakia

## 2019-10-21 NOTE — PROCEDURES
[facility] Cardiology      Procedure:  PCI MID RCA, ANGIOMAX                         PERCLOSE RFA            RESULTS: PTCA/STENT MID RCA, 80% TO 0% WITH 3 X 20 PROMUS EMILY AND 3.5 X 16 PROMUS DE STENTS MORE PROXIMALLY

## 2019-10-21 NOTE — CARDIAC REHAB
Cardiac rehab saw patient for CAD education post Stents. Education completed with patient and spouse present. Patient will defer phase 2 CR for now due to insurance changing in January. He has number to call to schedule and has been in program before.

## 2019-10-22 VITALS
RESPIRATION RATE: 16 BRPM | SYSTOLIC BLOOD PRESSURE: 158 MMHG | TEMPERATURE: 98 F | WEIGHT: 265 LBS | HEART RATE: 88 BPM | BODY MASS INDEX: 35.12 KG/M2 | DIASTOLIC BLOOD PRESSURE: 74 MMHG | HEIGHT: 73 IN | OXYGEN SATURATION: 90 %

## 2019-10-22 PROCEDURE — 82962 GLUCOSE BLOOD TEST: CPT

## 2019-10-22 PROCEDURE — 85027 COMPLETE CBC AUTOMATED: CPT | Performed by: INTERNAL MEDICINE

## 2019-10-22 PROCEDURE — 80048 BASIC METABOLIC PNL TOTAL CA: CPT | Performed by: INTERNAL MEDICINE

## 2019-10-22 RX ADMIN — MELATONIN 325 MG: at 08:22:00

## 2019-10-22 RX ADMIN — ACETAMINOPHEN 650 MG: 325 TABLET ORAL at 14:26:00

## 2019-10-22 RX ADMIN — ASPIRIN 81 MG: 81 TABLET ORAL at 08:22:00

## 2019-10-22 RX ADMIN — METOPROLOL SUCCINATE 25 MG: 25 TABLET, EXTENDED RELEASE ORAL at 06:51:00

## 2019-10-22 RX ADMIN — GABAPENTIN 300 MG: 300 CAPSULE ORAL at 14:23:00

## 2019-10-22 RX ADMIN — LOSARTAN POTASSIUM 25 MG: 25 TABLET ORAL at 08:22:00

## 2019-10-22 RX ADMIN — CLOPIDOGREL BISULFATE 75 MG: 75 TABLET ORAL at 08:22:00

## 2019-10-22 RX ADMIN — GABAPENTIN 600 MG: 300 CAPSULE ORAL at 08:22:00

## 2019-10-22 RX ADMIN — FENOFIBRATE 134 MG: 134 CAPSULE ORAL at 08:22:00

## 2019-10-22 RX ADMIN — ALFUZOSIN HYDROCHLORIDE 10 MG: 10 TABLET, EXTENDED RELEASE ORAL at 08:22:00

## 2019-10-22 NOTE — PLAN OF CARE
Patient sitting up in chair, denies CP, SOB and dizziness. Right groin site soft, dressing cdi. AM medications given. Patient AO X4; NSR on cardiac monitor; lungs clear bilaterally, RA, no cough noted; patient up at ayaka. Plan for ambulation and discharge.

## 2019-10-22 NOTE — PROGRESS NOTES
[facility] Cardiology Progress Note        Mariah Hurtado Patient Status:  Outpatient in a Bed    1960 MRN [id number]   [facility] 8NE-A Attending Parul Rae Day # 0 PCP Lily Richey MD bruits. Cardiac: Regular S1S2. No S3, S4, rub, click. No murmur. Lungs: Clear to auscultation and percussion. Abdomen: Soft, non-tender. Extremities: No LE edema. No clubbing or cyanosis.   No hematoma RFA site  Neurologic: Alert and oriented, bertha better     6. Hx or  Kidney CA     7.  CKD stage 4      Plan:    Home today  Plavix, asa, statin  See me in 4-6 weeks        Eileen Murphy  10/22/2019  8:04 AM

## 2019-10-22 NOTE — CARDIAC REHAB
Stent card given to patient. Encouraged patient to contact Cardiac Rehab department in January, when insurance changes, to enroll in outpatient cardiac rehab.

## 2019-10-22 NOTE — RESPIRATORY THERAPY NOTE
RHETT - Equipment Use Daily Summary:                  . Set Mode: AUTO CPAP WITH C-FLEX                . Usage in hours: 8:06                . 90% Pressure (EPAP) level: 10.3                . 90% Insp. Pressure (IPAP): Eduard Sabillon  AHI: 1.5

## 2019-10-22 NOTE — PLAN OF CARE
Patient tele D/C, IV discontinued with catheter intact. Patient denies CP, SOB, dizziness, or palpitations. Patient denies calf tenderness. Patient discharge instructions reviewed with patient, verbalize understanding.  Patient medication and their side eff threatening arrhythmias  - Monitor electrolytes and administer replacement therapy as ordered  10/22/2019 1500 by Barbara Wilkes RN  Outcome: Adequate for Discharge  10/22/2019 0827 by Barbara Wilkes RN  Outcome: Progressing

## 2019-10-22 NOTE — PLAN OF CARE
Received bedside report on this Pt., at 1518. Pt., awake, A&Ox4, calm, pleasant and cooperative. Pt., is in SR on Tele monitor, sats greater than 92% on RA, denies any pain or distress.  Pt's BP elevated and he requested his Gabapentin this afternoon, paged

## 2019-10-22 NOTE — PLAN OF CARE
Received patient on bed, sleeping with CPAP on. Woke up, alert and oriented. Denied chest pain, denied SOB. Right groin site soft with clean and dry dressing. Discussed POC. Due meds given. Needs attended to. Will continue to monitor.       Problem: Diabete Absence of cardiac arrhythmias or at baseline  Description  INTERVENTIONS:  - Continuous cardiac monitoring, monitor vital signs, obtain 12 lead EKG if indicated  - Evaluate effectiveness of antiarrhythmic and heart rate control medications as ordered  - I

## 2019-10-29 NOTE — PROCEDURES
[facility]    PATIENT'S NAME: Alfreida Shock   ATTENDING PHYSICIAN: Javier Ba M.D. OPERATING PHYSICIAN: Javier Ba M.D.    PATIENT ACCOUNT#:   [de-identified]    LOCATION:  [address] [place] [address]  MEDICAL RECORD #:   [id number]       ANGY runoff. The proximal of the 2 was improved from 70% to 0% with no dissection and with good runoff. The SREEDHAR flow was SREEDHAR 3 in both lesions before the procedure and SREEDHAR 3 in both lesions after the procedure.   At the end of the procedure, the right femor

## 2019-12-05 DIAGNOSIS — E11.42 DIABETIC PERIPHERAL NEUROPATHY ASSOCIATED WITH TYPE 2 DIABETES MELLITUS (HCC): ICD-10-CM

## 2019-12-05 DIAGNOSIS — E11.43 DIABETIC AUTONOMIC NEUROPATHY ASSOCIATED WITH TYPE 2 DIABETES MELLITUS (HCC): ICD-10-CM

## 2019-12-05 RX ORDER — GABAPENTIN 300 MG/1
CAPSULE ORAL
Qty: 540 CAPSULE | Refills: 1 | Status: SHIPPED | OUTPATIENT
Start: 2019-12-05 | End: 2020-03-05 | Stop reason: DRUGHIGH

## 2019-12-05 NOTE — TELEPHONE ENCOUNTER
Medication: Gabapentin 300 mg    Date of last refill: 09/10/2019    Last office visit: 9/11/2019  Due back to clinic per last office note:  RTN in 6 months  Date next office visit scheduled:    Future Appointments   Date Time Provider Raul Ballard his chronic kidney disease, and reported dizziness, will reduce this down to 600 mg twice daily and monitor for changes.      (E11.40,  E11.65) Type 2 diabetes, uncontrolled, with neuropathy (HCC)  Plan: DISCONTINUED: gabapentin 300 MG Oral Cap        As n

## 2019-12-26 ENCOUNTER — APPOINTMENT (OUTPATIENT)
Dept: CT IMAGING | Facility: HOSPITAL | Age: 59
End: 2019-12-26
Attending: EMERGENCY MEDICINE
Payer: COMMERCIAL

## 2019-12-26 ENCOUNTER — HOSPITAL ENCOUNTER (OUTPATIENT)
Facility: HOSPITAL | Age: 59
Setting detail: OBSERVATION
Discharge: HOME | End: 2019-12-27
Attending: EMERGENCY MEDICINE | Admitting: HOSPITALIST
Payer: COMMERCIAL

## 2019-12-26 DIAGNOSIS — R19.7 NAUSEA VOMITING AND DIARRHEA: Primary | ICD-10-CM

## 2019-12-26 DIAGNOSIS — R00.0 SINUS TACHYCARDIA: ICD-10-CM

## 2019-12-26 DIAGNOSIS — R11.2 NAUSEA VOMITING AND DIARRHEA: Primary | ICD-10-CM

## 2019-12-26 PROCEDURE — 96365 THER/PROPH/DIAG IV INF INIT: CPT

## 2019-12-26 PROCEDURE — 93010 ELECTROCARDIOGRAM REPORT: CPT

## 2019-12-26 PROCEDURE — 93005 ELECTROCARDIOGRAM TRACING: CPT

## 2019-12-26 PROCEDURE — 85379 FIBRIN DEGRADATION QUANT: CPT | Performed by: EMERGENCY MEDICINE

## 2019-12-26 PROCEDURE — 36415 COLL VENOUS BLD VENIPUNCTURE: CPT

## 2019-12-26 PROCEDURE — 83605 ASSAY OF LACTIC ACID: CPT | Performed by: EMERGENCY MEDICINE

## 2019-12-26 PROCEDURE — 99285 EMERGENCY DEPT VISIT HI MDM: CPT

## 2019-12-26 PROCEDURE — 83690 ASSAY OF LIPASE: CPT | Performed by: EMERGENCY MEDICINE

## 2019-12-26 PROCEDURE — 83735 ASSAY OF MAGNESIUM: CPT | Performed by: EMERGENCY MEDICINE

## 2019-12-26 PROCEDURE — 96375 TX/PRO/DX INJ NEW DRUG ADDON: CPT

## 2019-12-26 PROCEDURE — 71275 CT ANGIOGRAPHY CHEST: CPT | Performed by: EMERGENCY MEDICINE

## 2019-12-26 PROCEDURE — 87040 BLOOD CULTURE FOR BACTERIA: CPT | Performed by: EMERGENCY MEDICINE

## 2019-12-26 PROCEDURE — 96361 HYDRATE IV INFUSION ADD-ON: CPT

## 2019-12-26 PROCEDURE — 81001 URINALYSIS AUTO W/SCOPE: CPT | Performed by: EMERGENCY MEDICINE

## 2019-12-26 PROCEDURE — 80053 COMPREHEN METABOLIC PANEL: CPT | Performed by: EMERGENCY MEDICINE

## 2019-12-26 PROCEDURE — 84484 ASSAY OF TROPONIN QUANT: CPT | Performed by: EMERGENCY MEDICINE

## 2019-12-26 PROCEDURE — 74177 CT ABD & PELVIS W/CONTRAST: CPT | Performed by: EMERGENCY MEDICINE

## 2019-12-26 PROCEDURE — 85025 COMPLETE CBC W/AUTO DIFF WBC: CPT | Performed by: EMERGENCY MEDICINE

## 2019-12-26 PROCEDURE — 85610 PROTHROMBIN TIME: CPT | Performed by: EMERGENCY MEDICINE

## 2019-12-26 RX ORDER — MAGNESIUM SULFATE HEPTAHYDRATE 40 MG/ML
2 INJECTION, SOLUTION INTRAVENOUS ONCE
Status: COMPLETED | OUTPATIENT
Start: 2019-12-26 | End: 2019-12-27

## 2019-12-26 RX ORDER — ACETAMINOPHEN 500 MG
1000 TABLET ORAL ONCE
Status: COMPLETED | OUTPATIENT
Start: 2019-12-26 | End: 2019-12-26

## 2019-12-26 RX ORDER — ONDANSETRON 2 MG/ML
4 INJECTION INTRAMUSCULAR; INTRAVENOUS ONCE
Status: DISCONTINUED | OUTPATIENT
Start: 2019-12-26 | End: 2019-12-27

## 2019-12-27 VITALS
TEMPERATURE: 99 F | DIASTOLIC BLOOD PRESSURE: 68 MMHG | HEIGHT: 72 IN | BODY MASS INDEX: 35.21 KG/M2 | WEIGHT: 260 LBS | OXYGEN SATURATION: 97 % | SYSTOLIC BLOOD PRESSURE: 118 MMHG | RESPIRATION RATE: 22 BRPM | HEART RATE: 108 BPM

## 2019-12-27 PROBLEM — R11.2 NAUSEA VOMITING AND DIARRHEA: Status: ACTIVE | Noted: 2019-12-27

## 2019-12-27 PROBLEM — R00.0 SINUS TACHYCARDIA: Status: ACTIVE | Noted: 2019-12-27

## 2019-12-27 PROBLEM — R19.7 NAUSEA VOMITING AND DIARRHEA: Status: ACTIVE | Noted: 2019-12-27

## 2019-12-27 PROCEDURE — 82962 GLUCOSE BLOOD TEST: CPT

## 2019-12-27 PROCEDURE — 83605 ASSAY OF LACTIC ACID: CPT | Performed by: EMERGENCY MEDICINE

## 2019-12-27 PROCEDURE — 87581 M.PNEUMON DNA AMP PROBE: CPT | Performed by: EMERGENCY MEDICINE

## 2019-12-27 PROCEDURE — 87633 RESP VIRUS 12-25 TARGETS: CPT | Performed by: EMERGENCY MEDICINE

## 2019-12-27 PROCEDURE — 87486 CHLMYD PNEUM DNA AMP PROBE: CPT | Performed by: EMERGENCY MEDICINE

## 2019-12-27 PROCEDURE — 87999 UNLISTED MICROBIOLOGY PX: CPT

## 2019-12-27 PROCEDURE — 80053 COMPREHEN METABOLIC PANEL: CPT | Performed by: HOSPITALIST

## 2019-12-27 PROCEDURE — 84484 ASSAY OF TROPONIN QUANT: CPT | Performed by: HOSPITALIST

## 2019-12-27 PROCEDURE — 85027 COMPLETE CBC AUTOMATED: CPT | Performed by: HOSPITALIST

## 2019-12-27 PROCEDURE — 87798 DETECT AGENT NOS DNA AMP: CPT | Performed by: EMERGENCY MEDICINE

## 2019-12-27 PROCEDURE — 83735 ASSAY OF MAGNESIUM: CPT | Performed by: HOSPITALIST

## 2019-12-27 RX ORDER — GABAPENTIN 600 MG/1
600 TABLET ORAL EVERY MORNING
Status: DISCONTINUED | OUTPATIENT
Start: 2019-12-27 | End: 2019-12-27

## 2019-12-27 RX ORDER — ATORVASTATIN CALCIUM 80 MG/1
80 TABLET, FILM COATED ORAL NIGHTLY
Status: DISCONTINUED | OUTPATIENT
Start: 2019-12-27 | End: 2019-12-27

## 2019-12-27 RX ORDER — SODIUM CHLORIDE 9 MG/ML
INJECTION, SOLUTION INTRAVENOUS CONTINUOUS
Status: DISCONTINUED | OUTPATIENT
Start: 2019-12-27 | End: 2019-12-27

## 2019-12-27 RX ORDER — CLOPIDOGREL BISULFATE 75 MG/1
75 TABLET ORAL
Status: DISCONTINUED | OUTPATIENT
Start: 2019-12-27 | End: 2019-12-27

## 2019-12-27 RX ORDER — CYCLOBENZAPRINE HCL 5 MG
5 TABLET ORAL 3 TIMES DAILY PRN
Status: DISCONTINUED | OUTPATIENT
Start: 2019-12-27 | End: 2019-12-27

## 2019-12-27 RX ORDER — ASPIRIN 81 MG/1
81 TABLET, CHEWABLE ORAL DAILY
Status: DISCONTINUED | OUTPATIENT
Start: 2019-12-27 | End: 2019-12-27

## 2019-12-27 RX ORDER — DEXTROSE MONOHYDRATE 25 G/50ML
50 INJECTION, SOLUTION INTRAVENOUS
Status: DISCONTINUED | OUTPATIENT
Start: 2019-12-27 | End: 2019-12-27

## 2019-12-27 RX ORDER — MONTELUKAST SODIUM 10 MG/1
10 TABLET ORAL NIGHTLY
Status: DISCONTINUED | OUTPATIENT
Start: 2019-12-27 | End: 2019-12-27

## 2019-12-27 RX ORDER — CETIRIZINE HYDROCHLORIDE 10 MG/1
10 TABLET ORAL DAILY
Status: DISCONTINUED | OUTPATIENT
Start: 2019-12-28 | End: 2019-12-27

## 2019-12-27 RX ORDER — MELATONIN
325 EVERY OTHER DAY
Status: DISCONTINUED | OUTPATIENT
Start: 2019-12-28 | End: 2019-12-27

## 2019-12-27 RX ORDER — ALBUTEROL SULFATE 90 UG/1
2 AEROSOL, METERED RESPIRATORY (INHALATION) EVERY 4 HOURS PRN
Status: DISCONTINUED | OUTPATIENT
Start: 2019-12-27 | End: 2019-12-27

## 2019-12-27 RX ORDER — METOPROLOL SUCCINATE 25 MG/1
25 TABLET, EXTENDED RELEASE ORAL DAILY
Status: DISCONTINUED | OUTPATIENT
Start: 2019-12-27 | End: 2019-12-27

## 2019-12-27 RX ORDER — HEPARIN SODIUM 5000 [USP'U]/ML
5000 INJECTION, SOLUTION INTRAVENOUS; SUBCUTANEOUS EVERY 8 HOURS SCHEDULED
Status: DISCONTINUED | OUTPATIENT
Start: 2019-12-27 | End: 2019-12-27

## 2019-12-27 RX ORDER — ONDANSETRON 2 MG/ML
4 INJECTION INTRAMUSCULAR; INTRAVENOUS EVERY 6 HOURS PRN
Status: DISCONTINUED | OUTPATIENT
Start: 2019-12-27 | End: 2019-12-27

## 2019-12-27 RX ORDER — GABAPENTIN 300 MG/1
300 CAPSULE ORAL 2 TIMES DAILY
Status: DISCONTINUED | OUTPATIENT
Start: 2019-12-27 | End: 2019-12-27

## 2019-12-27 RX ORDER — AMITRIPTYLINE HYDROCHLORIDE 25 MG/1
25 TABLET, FILM COATED ORAL NIGHTLY
Status: DISCONTINUED | OUTPATIENT
Start: 2019-12-27 | End: 2019-12-27

## 2019-12-27 RX ORDER — FENOFIBRATE 134 MG/1
134 CAPSULE ORAL
Status: DISCONTINUED | OUTPATIENT
Start: 2019-12-27 | End: 2019-12-27

## 2019-12-27 RX ORDER — IPRATROPIUM BROMIDE AND ALBUTEROL SULFATE 2.5; .5 MG/3ML; MG/3ML
3 SOLUTION RESPIRATORY (INHALATION) EVERY 6 HOURS PRN
Status: DISCONTINUED | OUTPATIENT
Start: 2019-12-27 | End: 2019-12-27

## 2019-12-27 RX ORDER — ALFUZOSIN HYDROCHLORIDE 10 MG/1
10 TABLET, EXTENDED RELEASE ORAL
Status: DISCONTINUED | OUTPATIENT
Start: 2019-12-27 | End: 2019-12-27

## 2019-12-27 RX ORDER — ACETAMINOPHEN 325 MG/1
650 TABLET ORAL EVERY 6 HOURS PRN
Status: DISCONTINUED | OUTPATIENT
Start: 2019-12-27 | End: 2019-12-27

## 2019-12-27 NOTE — ED PROVIDER NOTES
Patient Seen in: [facility] Emergency Department      History   Patient presents with:  Abdomen/Flank Pain  Nausea/Vomiting/Diarrhea    Stated Complaint: N/V/D    HPI    20-year-old gentleman coming in for nausea vomiting diarrhea.   Symptoms started Pneumonia due to organism    • Primary male hypogonadism 4/16/2013    Dx in 4/2013   • Psychiatric disorder    • Renal cancer ([facility] [address] 12/27/2010    Right nephrectomy in 2010    • RENAL DISEASE    • Renal disorder    • S/p nephrectomy 12/27/2012    Right neph MD at . [address]  in [address] [facility] Inguinal   • HERNIA SURGERY  6-25-12 [facility]    Lap Repair Ventral Hernia w/mesh & Lysis of Adhesions   • KNEE ARTHROSCOPY  03/2019    RT   • KNEE ARTHROSCOPY  05/2019    LT   • LAPARO RADICAL NEPHRECTOMY R supple. Cardiovascular:      Rate and Rhythm: Tachycardia present. Pulmonary:      Effort: Pulmonary effort is normal. No respiratory distress. Breath sounds: Normal breath sounds. Abdominal:      General: There is no distension.       Palpations value of approximately 95% when results are used as part of the total clinical evaluation of the patient.    CBC W/ DIFFERENTIAL - Abnormal; Notable for the following components:    Lymphocyte Absolute 0.85 (*)     All other components within normal limits atelectasis in     the right lower lobe. MEDIASTINUM:  No mass or adenopathy. DYLAN:  No mass or adenopathy. CARDIAC:  No significant pericardial effusion. PLEURA:  No pneumothorax or effusion.       AORTA:  Ectasia of the ascending aorta m Slight anterior wall thickening of the urinary bladder is nonspecific. Recommend clinical correlation to exclude cystitis.            Dictated by: Dylon Richards MD on 12/27/2019 at 0:17         Approved by: Dylon Richards MD on 12/27/2019 at 0:26 R19.7 12/27/2019 Unknown

## 2019-12-27 NOTE — PLAN OF CARE
Assumed care at 0200. Pt admitted for n/v and diarrhea. CT of abdomen (-). A&O x4. On 2L of 02 NC, . RA baseline. Uses CPAP at night. NSR, ST on tele, HR ranging b/w 115- 120 at rest, MD aware. Stool sample needs to be collected for r/u C.diff.

## 2019-12-28 NOTE — PLAN OF CARE
NURSING DISCHARGE NOTE    Discharged Home via Wheelchair. Accompanied by Support staff  Belongings Taken by patient/family. Patient discharged. Paperwork given and explained.   All belongings taken

## 2020-01-17 PROBLEM — R19.7 NAUSEA VOMITING AND DIARRHEA: Status: RESOLVED | Noted: 2019-12-27 | Resolved: 2020-01-17

## 2020-01-17 PROBLEM — R11.2 NAUSEA VOMITING AND DIARRHEA: Status: RESOLVED | Noted: 2019-12-27 | Resolved: 2020-01-17

## 2020-01-17 PROCEDURE — 3060F POS MICROALBUMINURIA REV: CPT | Performed by: INTERNAL MEDICINE

## 2020-01-17 PROCEDURE — 3051F HG A1C>EQUAL 7.0%<8.0%: CPT | Performed by: INTERNAL MEDICINE

## 2020-02-20 ENCOUNTER — HOSPITAL ENCOUNTER (OUTPATIENT)
Facility: HOSPITAL | Age: 60
Setting detail: HOSPITAL OUTPATIENT SURGERY
Discharge: HOME | End: 2020-02-20
Attending: INTERNAL MEDICINE | Admitting: INTERNAL MEDICINE
Payer: COMMERCIAL

## 2020-02-20 VITALS
TEMPERATURE: 98 F | RESPIRATION RATE: 20 BRPM | HEART RATE: 73 BPM | BODY MASS INDEX: 33.8 KG/M2 | OXYGEN SATURATION: 95 % | HEIGHT: 73 IN | DIASTOLIC BLOOD PRESSURE: 63 MMHG | WEIGHT: 255 LBS | SYSTOLIC BLOOD PRESSURE: 98 MMHG

## 2020-02-20 DIAGNOSIS — R11.2 NON-INTRACTABLE VOMITING WITH NAUSEA, UNSPECIFIED VOMITING TYPE: ICD-10-CM

## 2020-02-20 LAB — GLUCOSE BLD-MCNC: 98 MG/DL (ref 70–99)

## 2020-02-20 PROCEDURE — 82962 GLUCOSE BLOOD TEST: CPT

## 2020-02-20 PROCEDURE — 0DB68ZX EXCISION OF STOMACH, VIA NATURAL OR ARTIFICIAL OPENING ENDOSCOPIC, DIAGNOSTIC: ICD-10-PCS | Performed by: INTERNAL MEDICINE

## 2020-02-20 PROCEDURE — 99152 MOD SED SAME PHYS/QHP 5/>YRS: CPT | Performed by: INTERNAL MEDICINE

## 2020-02-20 PROCEDURE — 88305 TISSUE EXAM BY PATHOLOGIST: CPT | Performed by: INTERNAL MEDICINE

## 2020-02-20 RX ORDER — SODIUM CHLORIDE, SODIUM LACTATE, POTASSIUM CHLORIDE, CALCIUM CHLORIDE 600; 310; 30; 20 MG/100ML; MG/100ML; MG/100ML; MG/100ML
INJECTION, SOLUTION INTRAVENOUS CONTINUOUS
Status: DISCONTINUED | OUTPATIENT
Start: 2020-02-20 | End: 2020-02-20

## 2020-02-20 RX ORDER — ONDANSETRON 2 MG/ML
4 INJECTION INTRAMUSCULAR; INTRAVENOUS ONCE AS NEEDED
Status: DISCONTINUED | OUTPATIENT
Start: 2020-02-20 | End: 2020-02-20

## 2020-02-20 RX ORDER — MIDAZOLAM HYDROCHLORIDE 1 MG/ML
INJECTION INTRAMUSCULAR; INTRAVENOUS
Status: DISCONTINUED | OUTPATIENT
Start: 2020-02-20 | End: 2020-02-20

## 2020-02-20 NOTE — H&P
[facility] GASTROENTEROLOGY    REFERRING PHYSICIAN: Dr. Joanna Boyd is a 61year old male. N,V    See note reviewed from 1/28/20    PROCEDURE: EGD    Allergies: Patient has no known allergies.   No current outpatient medications on complication, not stated as uncontrolled Dx at age 43    Type 2 DM   • Unspecified essential hypertension    • Unspecified sleep apnea [facility] SPLIT NIGHT 12-5-12    AHI 93 RDI 97 SaO2 siri 62%  CPAP 11 Premier   • Valvular disease    • Ventral hernia 6/11/20 OTHER SURGICAL HISTORY  10/30/2017    flow us dr Frida Nieves   • UPPER GI ENDOSCOPY,BIOPSY  8/23/13= Gastritis. H pylori positive, SB Bx normal    HP Rx 8/13.   HP negative 4/7     Social History    Tobacco Use      Smoking status: Former Smoker        Packs/day: 0

## 2020-02-20 NOTE — OPERATIVE REPORT
Anibal Avalos Patient Status:  Hospital Outpatient Surgery    1960 MRN [id number]   [facility] ENDOSCOPY Attending Penny Hernandez MD   [facility] Day # 0 PCP Jimena Marie MD         PATIENT NAME: Zahraa Aguirre OF

## 2020-02-21 NOTE — PROGRESS NOTES
Here are the biopsy/pathology findings from your recent EGD (Upper  Endoscopy):    Normal stomach biopsies no infection seen. If abd pain symptoms persist, may need to see surgeon about possible gallbladder surgery.     Results given to patient verbalize

## 2020-03-05 ENCOUNTER — OFFICE VISIT (OUTPATIENT)
Dept: NEUROLOGY | Facility: CLINIC | Age: 60
End: 2020-03-05
Payer: COMMERCIAL

## 2020-03-05 VITALS
RESPIRATION RATE: 16 BRPM | HEIGHT: 73 IN | BODY MASS INDEX: 34.99 KG/M2 | WEIGHT: 264 LBS | SYSTOLIC BLOOD PRESSURE: 131 MMHG | DIASTOLIC BLOOD PRESSURE: 70 MMHG | HEART RATE: 74 BPM

## 2020-03-05 DIAGNOSIS — E11.40 TYPE 2 DIABETES, UNCONTROLLED, WITH NEUROPATHY (HCC): Primary | ICD-10-CM

## 2020-03-05 DIAGNOSIS — E11.65 TYPE 2 DIABETES, UNCONTROLLED, WITH NEUROPATHY (HCC): Primary | ICD-10-CM

## 2020-03-05 DIAGNOSIS — R20.2 PARESTHESIAS: ICD-10-CM

## 2020-03-05 PROCEDURE — 99213 OFFICE O/P EST LOW 20 MIN: CPT | Performed by: OTHER

## 2020-03-05 RX ORDER — GABAPENTIN 300 MG/1
300 CAPSULE ORAL 2 TIMES DAILY
Qty: 180 CAPSULE | Refills: 1 | Status: SHIPPED | OUTPATIENT
Start: 2020-03-05 | End: 2020-07-26

## 2020-03-05 RX ORDER — GABAPENTIN 300 MG/1
600 CAPSULE ORAL 2 TIMES DAILY
COMMUNITY
End: 2020-03-05

## 2020-03-05 NOTE — PROGRESS NOTES
[facility] Progress Note    HPI    Follow-up: Neuropathy, dizziness    As per my initial note, 12/31/15:  Henrique Acosta is a 61year old man, with past medical history significant for multiple medical problems, including hypertension, transient. He also had chest tightness 7/2019 and had abnormal stress test and had angiogram done which showed stenosis and had angioplasty at [facility]. Patient last seen 9/2019.   Since last visit had cardiac cath and PCI RCA 10/2019 to h Renal disorder     ckd stage 3   • S/p nephrectomy 12/27/2012    Right nephrectomy in 2010 for renal cancer    • Stroke [facility])     tia 2015   • TIA (transient ischemic attack)    • Type 2 diabetes mellitus with vascular disease ([facility] [address] 3/9/2016    cerebral CVOR   • HERNIA SURGERY  in 1983    Rt Inguinal   • HERNIA SURGERY  6-25-12 [facility]    Lap Repair Ventral Hernia w/mesh & Lysis of Adhesions   • KNEE ARTHROSCOPY  03/2019    RT   • KNEE ARTHROSCOPY  05/2019    LT   • LAPARO RADICAL NEPHRECTOMY Right 08/3 10.00        Pack years: 2.5        Types: Cigarettes        Quit date: 1987        Years since quittin.1      Smokeless tobacco: Former User    Substance and Sexual Activity      Alcohol use: Not Currently        Comment: once per year      Drug External Cream, Rub into feet well twice daily for 2 weeks, Disp: 85 g, Rfl: 1  •  ACCU-CHEK SOFTCLIX LANCETS Does not apply Misc, Test 5 times daily, Disp: 500 each, Rfl: 3  •  loratadine 10 MG Oral Tab, Take 10 mg by mouth daily as needed.   , Disp: , Rfl large)   Pulse 74   Resp 16   Ht 73\"   Wt 264 lb (119.7 kg)   BMI 34.83 kg/m²   Estimated body mass index is 34.83 kg/m² as calculated from the following:    Height as of this encounter: 73\". Weight as of this encounter: 264 lb (119.7 kg).     Gen: wel monoclonal protein on serum electrophoresis. . . . IMMUNOFIXATION       No monoclonal protein detected by immunofixation. . . .    KAPPA FREE LIGHT CHAIN      0.330 - 1.940 mg/dL 4.289 (H)   LAMBDA FREE LIGHT CHAIN      0.571 - 2.630 mg/dL 2.876 (H)   KENDRICK Hematocrit      39.0 - 53.0 % 40.3   Platelet Count      019.4 - 450.0 10(3)uL 200.0   MCV      80.0 - 100.0 fL 84.3   MCH      26.0 - 34.0 pg 27.0   MCHC      31.0 - 37.0 g/dL 32.0   RDW      11.0 - 15.0 % 13.8   RDW-SD      35.1 - 46.3 fL 42.6   Prelim A/G RATIO       1.56   PROTEIN ELECT INTERPRETATION       No apparent monoclonal protein on serum electrophoresis. . . . IMMUNOFIXATION       No monoclonal protein detected by immunofixation. . . .    KAPPA FREE LIGHT CHAIN      0.330-1.940 mg/dL 4.194 foramen magnum with no Chiari malformation. PARASPINAL AREA:  Normal with no visible mass. No abnormal enhancement. BONY STRUCTURES:  No acute osseous abnormalities. Normal alignment of the cervical spine.   No lytic, blastic or destructive osseous ch signal abnormality. T12-L1:  Broad-based disc bulge without spinal canal or neural foraminal stenosis. L1-L2:  Minimal disc bulge without spinal canal or neural foraminal stenosis.   L2-L3:  Minimal disc bulge without spinal canal or neural foraminal sten L Median - Digit II (Antidromic)      Wrist Dig II 3.18 3.96 13.7 14.8 Wrist - Dig II 14   44   R Ulnar - Digit V (Antidromic)      Wrist Dig V 3.18 3.80 3.1 1.8 Wrist - Dig V 11   35      B. Elbow Dig V 3.13 3.96 8.2 0.84 B. Elbow - Wrist   0.16        A. C6-C8 N None None None None N N N N   L. Extensor digitorum communis Radial C7-C8 N None None None None N N N N   L. Flexor carpi radialis Median C6-C7 N None None None None N N N N   L.  Extensor indicis proprius Radial C7-C8 N None None None None N N N N conduction velocity; F wave response was prolonged. 8. Left median motor response was abnormal due to slowed conduction velocity, with normal amplitude and normal distal motor latency; F wave was prolonged.    9. Right ulnar motor response was abnormal du correlation and imaging review is advised. Prior as noted below    EMG (1/26/16): Findings:   Nerve Conduction Studies:   1. Right sural sensory response was absent  2. Right ulnar sensory response was absent  3.  Right median sensory response was a not met. In   light of the patient’s history, this is most likely related to   long-standing chronic poorly controlled diabetes; clinical   correlation and close observation is advised;             Impression/ Plan:  Sinai Shaikh is a 61year old man wit (E11.40,  E11.65) Type 2 diabetes, uncontrolled, with neuropathy (HCC)  (primary encounter diagnosis)  Plan: gabapentin 300 MG Oral Cap        As noted above     (R20.2) Paresthesias  Plan: gabapentin 300 MG Oral Cap        As noted above    Return in

## 2020-05-12 ENCOUNTER — HOSPITAL ENCOUNTER (OUTPATIENT)
Age: 60
Discharge: HOME | End: 2020-05-12
Attending: FAMILY MEDICINE
Payer: COMMERCIAL

## 2020-05-12 ENCOUNTER — APPOINTMENT (OUTPATIENT)
Dept: GENERAL RADIOLOGY | Age: 60
End: 2020-05-12
Attending: FAMILY MEDICINE
Payer: COMMERCIAL

## 2020-05-12 VITALS
RESPIRATION RATE: 18 BRPM | TEMPERATURE: 98 F | HEART RATE: 109 BPM | OXYGEN SATURATION: 95 % | SYSTOLIC BLOOD PRESSURE: 115 MMHG | DIASTOLIC BLOOD PRESSURE: 73 MMHG

## 2020-05-12 DIAGNOSIS — S90.211A CONTUSION OF RIGHT GREAT TOE WITH DAMAGE TO NAIL, INITIAL ENCOUNTER: Primary | ICD-10-CM

## 2020-05-12 PROCEDURE — 73630 X-RAY EXAM OF FOOT: CPT | Performed by: FAMILY MEDICINE

## 2020-05-12 PROCEDURE — 99214 OFFICE O/P EST MOD 30 MIN: CPT

## 2020-05-12 PROCEDURE — 99213 OFFICE O/P EST LOW 20 MIN: CPT

## 2020-05-12 RX ORDER — CEPHALEXIN 500 MG/1
500 CAPSULE ORAL 3 TIMES DAILY
Qty: 21 CAPSULE | Refills: 0 | Status: SHIPPED | OUTPATIENT
Start: 2020-05-12 | End: 2020-05-19

## 2020-05-12 NOTE — ED INITIAL ASSESSMENT (HPI)
The patient is here for evaluation of a right great toe injury that occurred last Wednesday when a metal filing cabinet fell onto the right foot.   He states he has diabetic neuropathy so he didn't feel anything at the time but when he took his sock off he

## 2020-06-26 ENCOUNTER — APPOINTMENT (OUTPATIENT)
Dept: GENERAL RADIOLOGY | Age: 60
End: 2020-06-26
Attending: NURSE PRACTITIONER
Payer: COMMERCIAL

## 2020-06-26 ENCOUNTER — HOSPITAL ENCOUNTER (OUTPATIENT)
Age: 60
Discharge: HOME | End: 2020-06-26
Payer: COMMERCIAL

## 2020-06-26 VITALS
TEMPERATURE: 98 F | SYSTOLIC BLOOD PRESSURE: 150 MMHG | OXYGEN SATURATION: 96 % | DIASTOLIC BLOOD PRESSURE: 76 MMHG | RESPIRATION RATE: 18 BRPM | HEART RATE: 98 BPM

## 2020-06-26 DIAGNOSIS — M25.572 CHRONIC PAIN OF LEFT ANKLE: Primary | ICD-10-CM

## 2020-06-26 DIAGNOSIS — G89.29 CHRONIC PAIN OF LEFT ANKLE: Primary | ICD-10-CM

## 2020-06-26 PROCEDURE — 73610 X-RAY EXAM OF ANKLE: CPT | Performed by: NURSE PRACTITIONER

## 2020-06-26 PROCEDURE — 73630 X-RAY EXAM OF FOOT: CPT | Performed by: NURSE PRACTITIONER

## 2020-06-26 PROCEDURE — 99213 OFFICE O/P EST LOW 20 MIN: CPT | Performed by: NURSE PRACTITIONER

## 2020-06-26 RX ORDER — ACETAMINOPHEN AND CODEINE PHOSPHATE 300; 30 MG/1; MG/1
1-2 TABLET ORAL EVERY 6 HOURS PRN
Qty: 10 TABLET | Refills: 0 | Status: SHIPPED | OUTPATIENT
Start: 2020-06-26 | End: 2020-07-03

## 2020-06-26 RX ORDER — ACETAMINOPHEN AND CODEINE PHOSPHATE 300; 30 MG/1; MG/1
1-2 TABLET ORAL EVERY 6 HOURS PRN
Qty: 10 TABLET | Refills: 0 | Status: SHIPPED | OUTPATIENT
Start: 2020-06-26 | End: 2020-06-26

## 2020-06-27 NOTE — ED PROVIDER NOTES
Patient Seen in: [facility]      History   Patient presents with:   Ankle Pain  Foot Pain    Stated Complaint: LEFT ANKLE PAIN     HPI  Patient is a 78-year-old gentleman past medical history of type 2 diabetes mellitus, hyperten HEART MURMUR    • High blood pressure    • High cholesterol    • History of blood transfusion    • HYPERTRIGLYCERIDEMIA     TG levels as high as 2000 range   • Low testosterone 5/1/2010   • Near syncope 9/20/2015   • Neuropathy    • Obesity, unspecified LAD, saphenous vein graft to the diagonal, second obtuse marginal, and acute marginal branch of the right.    • CABG  10/02/2013    LAD, Diagonal, 2nd OM, right marginal branch   • CARPAL TUNNEL RELEASE  06/2019    Right   • CATH DRUG ELUTING STENT     • CA Review of Systems    Positive for stated complaint: LEFT ANKLE PAIN   Other systems are as noted in HPI. Constitutional and vital signs reviewed. All other systems reviewed and negative except as noted above.     Physical Exam     ED Triage Krystle transcribed by Technologist)  The patient states that he has pain throughout his left ankle for years. CONCLUSION:  Large calcaneal enthesophytes are noted unchanged. No acute fracture.   Stable arthropathy with medial compartment narrowing of the left MD on 6/26/2020 at 9:10 PM            [facility]     80-year-old male with multiple comorbidities and established left osteochondral lesion left ankle with associated abnormal cartilage changes. History of Kenalog injections.   History of renal insufficiency and

## 2020-07-26 DIAGNOSIS — E11.65 TYPE 2 DIABETES, UNCONTROLLED, WITH NEUROPATHY (HCC): ICD-10-CM

## 2020-07-26 DIAGNOSIS — R20.2 PARESTHESIAS: ICD-10-CM

## 2020-07-26 DIAGNOSIS — E11.40 TYPE 2 DIABETES, UNCONTROLLED, WITH NEUROPATHY (HCC): ICD-10-CM

## 2020-07-27 ENCOUNTER — LAB ENCOUNTER (OUTPATIENT)
Dept: LAB | Age: 60
End: 2020-07-27
Attending: INTERNAL MEDICINE
Payer: COMMERCIAL

## 2020-07-27 DIAGNOSIS — E55.9 VITAMIN D INSUFFICIENCY: ICD-10-CM

## 2020-07-27 DIAGNOSIS — E78.5 HYPERLIPIDEMIA, UNSPECIFIED HYPERLIPIDEMIA TYPE: ICD-10-CM

## 2020-07-27 DIAGNOSIS — R60.9 EDEMA, UNSPECIFIED TYPE: ICD-10-CM

## 2020-07-27 DIAGNOSIS — N18.30 STAGE 3 CHRONIC KIDNEY DISEASE (HCC): ICD-10-CM

## 2020-07-27 DIAGNOSIS — Z90.5 S/P NEPHRECTOMY: ICD-10-CM

## 2020-07-27 DIAGNOSIS — E11.51 TYPE 2 DIABETES MELLITUS WITH PERIPHERAL VASCULAR DISEASE (HCC): ICD-10-CM

## 2020-07-27 DIAGNOSIS — D64.9 ANEMIA, UNSPECIFIED TYPE: ICD-10-CM

## 2020-07-27 DIAGNOSIS — R80.9 PROTEINURIA, UNSPECIFIED TYPE: ICD-10-CM

## 2020-07-27 LAB
BILIRUB UR QL STRIP.AUTO: NEGATIVE
C3 SERPL-MCNC: 174 MG/DL (ref 90–180)
C4 SERPL-MCNC: 37.2 MG/DL (ref 10–40)
CLARITY UR REFRACT.AUTO: CLEAR
COLOR UR AUTO: YELLOW
GLUCOSE UR STRIP.AUTO-MCNC: >=500 MG/DL
HBV CORE AB SERPL QL IA: NONREACTIVE
HBV SURFACE AB SER QL: NONREACTIVE
HBV SURFACE AB SERPL IA-ACNC: <3.1 MIU/ML
HBV SURFACE AG SER-ACNC: <0.1 [IU]/L
HBV SURFACE AG SERPL QL IA: NONREACTIVE
HCV AB SERPL QL IA: NONREACTIVE
KETONES UR STRIP.AUTO-MCNC: NEGATIVE MG/DL
LEUKOCYTE ESTERASE UR QL STRIP.AUTO: NEGATIVE
NITRITE UR QL STRIP.AUTO: NEGATIVE
PH UR STRIP.AUTO: 5 [PH] (ref 4.5–8)
PROT UR STRIP.AUTO-MCNC: 30 MG/DL
RBC UR QL AUTO: NEGATIVE
SP GR UR STRIP.AUTO: 1.01 (ref 1–1.03)
UROBILINOGEN UR STRIP.AUTO-MCNC: <2 MG/DL

## 2020-07-27 PROCEDURE — 36415 COLL VENOUS BLD VENIPUNCTURE: CPT

## 2020-07-27 PROCEDURE — 82728 ASSAY OF FERRITIN: CPT | Performed by: INTERNAL MEDICINE

## 2020-07-27 PROCEDURE — 86704 HEP B CORE ANTIBODY TOTAL: CPT

## 2020-07-27 PROCEDURE — 86038 ANTINUCLEAR ANTIBODIES: CPT

## 2020-07-27 PROCEDURE — 86160 COMPLEMENT ANTIGEN: CPT

## 2020-07-27 PROCEDURE — 86225 DNA ANTIBODY NATIVE: CPT

## 2020-07-27 PROCEDURE — 83883 ASSAY NEPHELOMETRY NOT SPEC: CPT | Performed by: INTERNAL MEDICINE

## 2020-07-27 PROCEDURE — 36415 COLL VENOUS BLD VENIPUNCTURE: CPT | Performed by: INTERNAL MEDICINE

## 2020-07-27 PROCEDURE — 86706 HEP B SURFACE ANTIBODY: CPT

## 2020-07-27 PROCEDURE — 86334 IMMUNOFIX E-PHORESIS SERUM: CPT | Performed by: INTERNAL MEDICINE

## 2020-07-27 PROCEDURE — 82306 VITAMIN D 25 HYDROXY: CPT | Performed by: INTERNAL MEDICINE

## 2020-07-27 PROCEDURE — 83550 IRON BINDING TEST: CPT | Performed by: INTERNAL MEDICINE

## 2020-07-27 PROCEDURE — 82570 ASSAY OF URINE CREATININE: CPT | Performed by: INTERNAL MEDICINE

## 2020-07-27 PROCEDURE — 80069 RENAL FUNCTION PANEL: CPT | Performed by: INTERNAL MEDICINE

## 2020-07-27 PROCEDURE — 84156 ASSAY OF PROTEIN URINE: CPT | Performed by: INTERNAL MEDICINE

## 2020-07-27 PROCEDURE — 83540 ASSAY OF IRON: CPT | Performed by: INTERNAL MEDICINE

## 2020-07-27 PROCEDURE — 87389 HIV-1 AG W/HIV-1&-2 AB AG IA: CPT

## 2020-07-27 PROCEDURE — 81001 URINALYSIS AUTO W/SCOPE: CPT

## 2020-07-27 PROCEDURE — 85025 COMPLETE CBC W/AUTO DIFF WBC: CPT | Performed by: INTERNAL MEDICINE

## 2020-07-27 PROCEDURE — 86803 HEPATITIS C AB TEST: CPT

## 2020-07-27 PROCEDURE — 83970 ASSAY OF PARATHORMONE: CPT | Performed by: INTERNAL MEDICINE

## 2020-07-27 PROCEDURE — 83735 ASSAY OF MAGNESIUM: CPT | Performed by: INTERNAL MEDICINE

## 2020-07-27 PROCEDURE — 87340 HEPATITIS B SURFACE AG IA: CPT

## 2020-07-27 PROCEDURE — 84165 PROTEIN E-PHORESIS SERUM: CPT | Performed by: INTERNAL MEDICINE

## 2020-07-27 RX ORDER — GABAPENTIN 300 MG/1
300 CAPSULE ORAL 2 TIMES DAILY
Qty: 180 CAPSULE | Refills: 1 | Status: SHIPPED | OUTPATIENT
Start: 2020-07-27 | End: 2020-08-18

## 2020-07-27 NOTE — TELEPHONE ENCOUNTER
Medication: Gabapentin 300 mg    Date of last refill: 03/05/20 with 1 addt refill  [place]     Last office visit: 3/5/2020  Due back to clinic per last office note: RTN in 6 months  Date next office visit scheduled:    F with right greater than left involvement.      Ganglioside Ab panel done previously and unremarkable, folate, B12, RPR, sed rate, CRP, NOEMI, TSH and monoclonal protein study normal aside from minimally elevated sed rate and elevated kappa and lamda light ch

## 2020-07-30 ENCOUNTER — PATIENT MESSAGE (OUTPATIENT)
Dept: NEUROLOGY | Facility: CLINIC | Age: 60
End: 2020-07-30

## 2020-07-30 NOTE — TELEPHONE ENCOUNTER
From: Negin March  To: Gil Schwartz MD  Sent: 7/30/2020 11:46 AM CDT  Subject: Prescription Question    My Perscription for Gabapentin was called in wrong.  I take 600mg in the Morning 300mg to 600mg in the afternoon Depending on activity and at [facility]

## 2020-07-30 NOTE — TELEPHONE ENCOUNTER
Patient called back and states he has been taking wrong. Patient VU of correct dosage and states he will pay out of pocket for 1 month until he can refill current RX.

## 2020-07-30 NOTE — TELEPHONE ENCOUNTER
Per MyChart message patient reports that he is taking Gabapentin     600 in the am and 300-600 mg in the PM    Per LOV on 3/5/2020 - provider reduced Gabapentin to 300 mg BID and patient was to monitor for changes.     Sent patient MyChart message informing

## 2020-08-01 LAB — ANA SCREEN: NEGATIVE

## 2020-08-18 DIAGNOSIS — R20.2 PARESTHESIAS: ICD-10-CM

## 2020-08-18 DIAGNOSIS — E11.65 TYPE 2 DIABETES, UNCONTROLLED, WITH NEUROPATHY (HCC): ICD-10-CM

## 2020-08-18 DIAGNOSIS — E11.40 TYPE 2 DIABETES, UNCONTROLLED, WITH NEUROPATHY (HCC): ICD-10-CM

## 2020-08-18 RX ORDER — GABAPENTIN 300 MG/1
CAPSULE ORAL
Qty: 360 CAPSULE | Refills: 1 | Status: ON HOLD | OUTPATIENT
Start: 2020-08-18 | End: 2020-10-03

## 2020-08-18 NOTE — TELEPHONE ENCOUNTER
Medication: Gabapentin 300 mg     Date of last refill: 07/27/20 with 1 addt refill # 180  [place]      Last office visit: 3/5/2020  Due back to clinic per last office note: RTN in 6 months  Date next office visit schedul entrapment neuropathy, with right greater than left involvement.      Ganglioside Ab panel done previously and unremarkable, folate, B12, RPR, sed rate, CRP, NOEMI, TSH and monoclonal protein study normal aside from minimally elevated sed rate and elevated k

## 2020-08-18 NOTE — TELEPHONE ENCOUNTER
Patient is taking Gabapentin 600 mg in the am,300 mg in the afternoon and 300 mg at night and it has been helping

## 2020-08-19 NOTE — PROGRESS NOTES
Informed via [employer].     Future Appointments  8/20/2020  9:30 AM    Helen Pickett MD           [facility]     [facility]  10/7/2020  10:15 AM   Gaye Crigler, MD          [facility] [address] [facility]  10/13/2020 8:40 AM    Merlinda Lake, MD       [facility] [place]

## 2020-10-01 ENCOUNTER — HOSPITAL ENCOUNTER (OUTPATIENT)
Facility: HOSPITAL | Age: 60
Setting detail: OBSERVATION
Discharge: HOME | End: 2020-10-03
Attending: EMERGENCY MEDICINE | Admitting: INTERNAL MEDICINE
Payer: COMMERCIAL

## 2020-10-01 ENCOUNTER — APPOINTMENT (OUTPATIENT)
Dept: CT IMAGING | Age: 60
End: 2020-10-01
Attending: EMERGENCY MEDICINE
Payer: COMMERCIAL

## 2020-10-01 DIAGNOSIS — G47.33 OSA ON CPAP: ICD-10-CM

## 2020-10-01 DIAGNOSIS — I10 HYPERTENSION, ESSENTIAL: ICD-10-CM

## 2020-10-01 DIAGNOSIS — Z95.1 S/P CABG X 4: ICD-10-CM

## 2020-10-01 DIAGNOSIS — R20.0 LEFT SIDED NUMBNESS: ICD-10-CM

## 2020-10-01 DIAGNOSIS — R79.89 AZOTEMIA: ICD-10-CM

## 2020-10-01 DIAGNOSIS — R07.9 ACUTE CHEST PAIN: Primary | ICD-10-CM

## 2020-10-01 DIAGNOSIS — I25.10 CORONARY ARTERY DISEASE INVOLVING NATIVE HEART WITHOUT ANGINA PECTORIS, UNSPECIFIED VESSEL OR LESION TYPE: ICD-10-CM

## 2020-10-01 DIAGNOSIS — Z99.89 OSA ON CPAP: ICD-10-CM

## 2020-10-01 DIAGNOSIS — E11.40 TYPE 2 DIABETES, UNCONTROLLED, WITH NEUROPATHY (HCC): ICD-10-CM

## 2020-10-01 DIAGNOSIS — E11.65 TYPE 2 DIABETES, UNCONTROLLED, WITH NEUROPATHY (HCC): ICD-10-CM

## 2020-10-01 DIAGNOSIS — R20.2 PARESTHESIAS: ICD-10-CM

## 2020-10-01 DIAGNOSIS — E11.42 TYPE 2 DIABETES MELLITUS WITH PERIPHERAL NEUROPATHY (HCC): ICD-10-CM

## 2020-10-01 PROCEDURE — 81001 URINALYSIS AUTO W/SCOPE: CPT | Performed by: EMERGENCY MEDICINE

## 2020-10-01 PROCEDURE — 99285 EMERGENCY DEPT VISIT HI MDM: CPT

## 2020-10-01 PROCEDURE — 84484 ASSAY OF TROPONIN QUANT: CPT | Performed by: EMERGENCY MEDICINE

## 2020-10-01 PROCEDURE — 36415 COLL VENOUS BLD VENIPUNCTURE: CPT

## 2020-10-01 PROCEDURE — 80053 COMPREHEN METABOLIC PANEL: CPT | Performed by: EMERGENCY MEDICINE

## 2020-10-01 PROCEDURE — 3046F HEMOGLOBIN A1C LEVEL >9.0%: CPT | Performed by: INTERNAL MEDICINE

## 2020-10-01 PROCEDURE — 83036 HEMOGLOBIN GLYCOSYLATED A1C: CPT | Performed by: HOSPITALIST

## 2020-10-01 PROCEDURE — 70450 CT HEAD/BRAIN W/O DYE: CPT | Performed by: EMERGENCY MEDICINE

## 2020-10-01 PROCEDURE — 84484 ASSAY OF TROPONIN QUANT: CPT | Performed by: INTERNAL MEDICINE

## 2020-10-01 PROCEDURE — 93005 ELECTROCARDIOGRAM TRACING: CPT

## 2020-10-01 PROCEDURE — 93010 ELECTROCARDIOGRAM REPORT: CPT

## 2020-10-01 PROCEDURE — 82962 GLUCOSE BLOOD TEST: CPT

## 2020-10-01 PROCEDURE — 85025 COMPLETE CBC W/AUTO DIFF WBC: CPT | Performed by: EMERGENCY MEDICINE

## 2020-10-01 RX ORDER — GABAPENTIN 300 MG/1
300 CAPSULE ORAL 2 TIMES DAILY
Status: DISCONTINUED | OUTPATIENT
Start: 2020-10-01 | End: 2020-10-03

## 2020-10-01 RX ORDER — FENOFIBRATE 134 MG/1
134 CAPSULE ORAL
Refills: 1 | Status: DISCONTINUED | OUTPATIENT
Start: 2020-10-02 | End: 2020-10-03

## 2020-10-01 RX ORDER — DEXTROSE MONOHYDRATE 25 G/50ML
50 INJECTION, SOLUTION INTRAVENOUS
Status: DISCONTINUED | OUTPATIENT
Start: 2020-10-01 | End: 2020-10-03

## 2020-10-01 RX ORDER — CYCLOBENZAPRINE HCL 5 MG
5 TABLET ORAL 3 TIMES DAILY PRN
Status: DISCONTINUED | OUTPATIENT
Start: 2020-10-01 | End: 2020-10-03

## 2020-10-01 RX ORDER — METOPROLOL SUCCINATE 50 MG/1
50 TABLET, EXTENDED RELEASE ORAL
Status: DISCONTINUED | OUTPATIENT
Start: 2020-10-02 | End: 2020-10-03

## 2020-10-01 RX ORDER — FAMOTIDINE 20 MG/1
20 TABLET ORAL DAILY
Status: DISCONTINUED | OUTPATIENT
Start: 2020-10-01 | End: 2020-10-03

## 2020-10-01 RX ORDER — ATORVASTATIN CALCIUM 20 MG/1
20 TABLET, FILM COATED ORAL NIGHTLY
Status: DISCONTINUED | OUTPATIENT
Start: 2020-10-01 | End: 2020-10-01

## 2020-10-01 RX ORDER — ROSUVASTATIN CALCIUM 20 MG/1
40 TABLET, COATED ORAL NIGHTLY
Status: DISCONTINUED | OUTPATIENT
Start: 2020-10-01 | End: 2020-10-03

## 2020-10-01 RX ORDER — AMITRIPTYLINE HYDROCHLORIDE 25 MG/1
25 TABLET, FILM COATED ORAL NIGHTLY
Status: DISCONTINUED | OUTPATIENT
Start: 2020-10-01 | End: 2020-10-03

## 2020-10-01 RX ORDER — ACETAMINOPHEN 325 MG/1
650 TABLET ORAL EVERY 6 HOURS PRN
Status: DISCONTINUED | OUTPATIENT
Start: 2020-10-01 | End: 2020-10-03

## 2020-10-01 RX ORDER — METOPROLOL SUCCINATE 25 MG/1
25 TABLET, EXTENDED RELEASE ORAL DAILY
Status: DISCONTINUED | OUTPATIENT
Start: 2020-10-02 | End: 2020-10-01

## 2020-10-01 RX ORDER — CLOPIDOGREL BISULFATE 75 MG/1
75 TABLET ORAL DAILY
Status: DISCONTINUED | OUTPATIENT
Start: 2020-10-02 | End: 2020-10-03

## 2020-10-01 RX ORDER — TAMSULOSIN HYDROCHLORIDE 0.4 MG/1
0.4 CAPSULE ORAL DAILY
Refills: 3 | Status: DISCONTINUED | OUTPATIENT
Start: 2020-10-02 | End: 2020-10-03

## 2020-10-01 RX ORDER — SODIUM CHLORIDE 9 MG/ML
INJECTION, SOLUTION INTRAVENOUS CONTINUOUS
Status: DISCONTINUED | OUTPATIENT
Start: 2020-10-01 | End: 2020-10-02

## 2020-10-01 RX ORDER — LOSARTAN POTASSIUM 50 MG/1
50 TABLET ORAL DAILY
Status: DISCONTINUED | OUTPATIENT
Start: 2020-10-01 | End: 2020-10-03

## 2020-10-01 RX ORDER — MONTELUKAST SODIUM 10 MG/1
10 TABLET ORAL NIGHTLY
Status: DISCONTINUED | OUTPATIENT
Start: 2020-10-01 | End: 2020-10-03

## 2020-10-01 RX ORDER — ASPIRIN 81 MG/1
81 TABLET, CHEWABLE ORAL DAILY
Status: DISCONTINUED | OUTPATIENT
Start: 2020-10-01 | End: 2020-10-03

## 2020-10-01 RX ORDER — ALBUTEROL SULFATE 90 UG/1
2 AEROSOL, METERED RESPIRATORY (INHALATION) EVERY 4 HOURS PRN
Status: DISCONTINUED | OUTPATIENT
Start: 2020-10-01 | End: 2020-10-03

## 2020-10-01 RX ORDER — ASPIRIN 81 MG/1
243 TABLET, CHEWABLE ORAL ONCE
Status: COMPLETED | OUTPATIENT
Start: 2020-10-01 | End: 2020-10-01

## 2020-10-01 RX ORDER — CLOPIDOGREL BISULFATE 75 MG/1
75 TABLET ORAL DAILY
Status: DISCONTINUED | OUTPATIENT
Start: 2020-10-02 | End: 2020-10-01

## 2020-10-01 RX ORDER — HEPARIN SODIUM 5000 [USP'U]/ML
5000 INJECTION, SOLUTION INTRAVENOUS; SUBCUTANEOUS EVERY 8 HOURS SCHEDULED
Status: DISCONTINUED | OUTPATIENT
Start: 2020-10-01 | End: 2020-10-03

## 2020-10-01 NOTE — PROGRESS NOTES
[facility] Pharmacy Note:  Renal Dose Adjustment    Alonzo Coats has been prescribed famotidine (PEPCID) 20 mg orally every 12 hours. Estimated Creatinine Clearance: 47 mL/min (A) (based on SCr of 1.89 mg/dL (H)).     Calculated creatinine clearance is < 50

## 2020-10-01 NOTE — H&P
[facility] Hospitalist History and Physical      CC: chest pain    PCP: Amanda Hendrix MD    History of Present Illness: Patient is a 61year old male with PMH sig for extensive CAD, Afib, CKD, IDDM with hyperglycemia, HTN, obesity, hx of TIA here with chest pain. Pneumonia due to organism    • Primary male hypogonadism 4/16/2013    Dx in 4/2013   • Psychiatric disorder    • Renal cancer ([facility] [address] 12/27/2010    Right nephrectomy in 2010    • RENAL DISEASE    • Renal disorder     ckd stage 3   • S/p nephrectomy 12/27/201 Strip, 1 kit by Does not apply route daily. , Disp: 500 strip, Rfl: 3    •  Fenofibrate 145 MG Oral Tab, Take 1 tablet (145 mg total) by mouth daily. , Disp: 90 tablet, Rfl: 1    •  Insulin Aspart Pen (NOVOLOG FLEXPEN) 100 UNIT/ML Subcutaneous Solution Pen-i Breath., Disp: 1 Inhaler, Rfl: 3    •  Ferrous Sulfate 324 (65 Fe) MG Oral Tab EC, Take 324 mg/day by mouth every other day., Disp: , Rfl:     •  aspirin 81 MG Oral Tab, Take 81 mg by mouth daily.   , Disp: , Rfl:     •  Multiple Vitamin (TAB-A-CRISTO) Oral T Skin   • Diabetes Mother         Type 2 DM   • Heart Disorder Mother    • Hypertension Mother    • Lipids Mother    • Other (Other) Mother         TIA/ prolapse valve   • Diabetes Maternal Grandfather         Type 2 DM   • Heart Disorder Maternal Grandfath CT HEad:  =====  CONCLUSION:  No acute intracranial abnormality.          Assessment/Plan:   Principal Problem:    Acute chest pain  Active Problems:    S/P CABG x 4    RHETT on CPAP    Hypertension, essential    Type 2 diabetes mellitus with peripheral n

## 2020-10-01 NOTE — CONSULTS
[facility] Cardiology Consultation    [facility]:  Observation    1960 MRN [id number]   [facility] 8NE-A Attending Jenny Friedman MD   Hosp Day # 0 PCP Gualberto Alvarado MD     Reason for Consultation:  Chest pain      H disorders(V13.59) 1969    rt foot   • Pneumonia due to organism    • Primary male hypogonadism 4/16/2013    Dx in 4/2013   • Psychiatric disorder    • Renal cancer ([facility] [address] 12/27/2010    Right nephrectomy in 2010    • RENAL DISEASE    • Renal disorder     ckd ESOPHAGOGASTRODUODENOSCOPY, POSSIBLE BIOPSY, POSSIBLE POLYPECTOMY 08083 N/A 8/23/2013    Performed by Katarina Nevarez MD at [facility] N/A 10/8/2013    Performed by Saleem Mae MD at [facility] about 32 years ago. His smoking use included cigarettes. He started smoking about 41 years ago. He has a 0.30 pack-year smoking history. He has quit using smokeless tobacco. He reports previous alcohol use. He reports that he does not use drugs.     Review the past and stent procedures involving his RCA and circumflex artery. He underwent cardiac catheterization because of problems with effort-related chest pain several months ago, which suggested stenoses in the RCA.   He is entering the hospital to undergo effort-related chest tightness and dyspnea. Cath, Ash, 7/2019: stenotic RCA---> EMILY RCA, 10/2019     2. Syncope 10/2017, in setting of standing, walking. Likely orthostatic hypotension, possibly worsened by BPH medicine     3.  DM , moderately con

## 2020-10-01 NOTE — ED INITIAL ASSESSMENT (HPI)
Pt c/o intermittent chest pain with left side arm numbness and numbness to left cheek for 1 week. Pt states that he was having chest pain 20 minutes ago while shopping that has since subsided but is still experiencing the numbness to the arm and cheek.  Pt

## 2020-10-01 NOTE — ED PROVIDER NOTES
Patient Seen in: [facility] Emergency Department In [place]      History   Patient presents with:  Numbness Weakness    Stated Complaint: numbness to r arm r/o stroke    HPI    27-year-old male with history of coronary artery disease status post CABG and musculoskeletal disorders(V13.59) 1970's    fractured both elbows   • Personal history of other musculoskeletal disorders(V13.59) 1969    rt foot   • Pneumonia due to organism    • Primary male hypogonadism 4/16/2013    Dx in 4/2013   • Psychiatric disorde needs MAC   • ESOPHAGOGASTRODUODENOSCOPY (EGD) N/A 2/20/2020    Performed by Raman Argueta MD at [facility] ENDOSCOPY   • ESOPHAGOGASTRODUODENOSCOPY, POSSIBLE BIOPSY, POSSIBLE POLYPECTOMY 02988 N/A 8/23/2013    Performed by Raman Argueta MD at [address] [place] [address] SpO2 98%   BMI 34.30 kg/m²         Physical Exam    General appearance: This is a middle-aged male lying in a gurney. Vital signs were reviewed per nurse's notes. HEENT: Normocephalic atraumatic. Anicteric sclera.   Oral mucosa is moist.  Tongue is midli findings and treatment plan were discussed with the patient including hospitalization for cardiac and neurologic evaluations. CT plane brain was ordered here in the emergency department.   The patient will require an MRI TIA work-up at [facility]

## 2020-10-02 ENCOUNTER — APPOINTMENT (OUTPATIENT)
Dept: INTERVENTIONAL RADIOLOGY/VASCULAR | Facility: HOSPITAL | Age: 60
End: 2020-10-02
Attending: INTERNAL MEDICINE
Payer: COMMERCIAL

## 2020-10-02 PROCEDURE — 99153 MOD SED SAME PHYS/QHP EA: CPT

## 2020-10-02 PROCEDURE — 84132 ASSAY OF SERUM POTASSIUM: CPT | Performed by: INTERNAL MEDICINE

## 2020-10-02 PROCEDURE — 80048 BASIC METABOLIC PNL TOTAL CA: CPT | Performed by: INTERNAL MEDICINE

## 2020-10-02 PROCEDURE — 93455 CORONARY ART/GRFT ANGIO S&I: CPT

## 2020-10-02 PROCEDURE — B2131ZZ FLUOROSCOPY OF MULTIPLE CORONARY ARTERY BYPASS GRAFTS USING LOW OSMOLAR CONTRAST: ICD-10-PCS | Performed by: INTERNAL MEDICINE

## 2020-10-02 PROCEDURE — B2181ZZ FLUOROSCOPY OF LEFT INTERNAL MAMMARY BYPASS GRAFT USING LOW OSMOLAR CONTRAST: ICD-10-PCS | Performed by: INTERNAL MEDICINE

## 2020-10-02 PROCEDURE — B2111ZZ FLUOROSCOPY OF MULTIPLE CORONARY ARTERIES USING LOW OSMOLAR CONTRAST: ICD-10-PCS | Performed by: INTERNAL MEDICINE

## 2020-10-02 PROCEDURE — 99152 MOD SED SAME PHYS/QHP 5/>YRS: CPT

## 2020-10-02 PROCEDURE — 82962 GLUCOSE BLOOD TEST: CPT

## 2020-10-02 PROCEDURE — 84484 ASSAY OF TROPONIN QUANT: CPT | Performed by: INTERNAL MEDICINE

## 2020-10-02 RX ORDER — SODIUM CHLORIDE 9 MG/ML
INJECTION, SOLUTION INTRAVENOUS CONTINUOUS
Status: ACTIVE | OUTPATIENT
Start: 2020-10-02 | End: 2020-10-02

## 2020-10-02 RX ORDER — LIDOCAINE HYDROCHLORIDE 10 MG/ML
INJECTION, SOLUTION EPIDURAL; INFILTRATION; INTRACAUDAL; PERINEURAL
Status: COMPLETED
Start: 2020-10-02 | End: 2020-10-02

## 2020-10-02 RX ORDER — HEPARIN SODIUM 5000 [USP'U]/ML
INJECTION, SOLUTION INTRAVENOUS; SUBCUTANEOUS
Status: COMPLETED
Start: 2020-10-02 | End: 2020-10-02

## 2020-10-02 RX ORDER — ISOSORBIDE MONONITRATE 60 MG/1
60 TABLET, EXTENDED RELEASE ORAL DAILY
Status: DISCONTINUED | OUTPATIENT
Start: 2020-10-02 | End: 2020-10-03

## 2020-10-02 RX ORDER — MIDAZOLAM HYDROCHLORIDE 1 MG/ML
INJECTION INTRAMUSCULAR; INTRAVENOUS
Status: COMPLETED
Start: 2020-10-02 | End: 2020-10-02

## 2020-10-02 RX ORDER — POTASSIUM CHLORIDE 20 MEQ/1
40 TABLET, EXTENDED RELEASE ORAL EVERY 4 HOURS
Status: COMPLETED | OUTPATIENT
Start: 2020-10-02 | End: 2020-10-02

## 2020-10-02 NOTE — PROGRESS NOTES
[facility] [address] [place] [address] Cardiology Progress Note        Armahnazkaylee Luizaemir Patient Status:  Observation    1960 MRN [id number]   [facility] 8NE-A Attending Jena Tracy MD   Hosp Day # 0 PCP MD Radha Brooke 86  Resp: 18  BP: 149/81  General:  Appears comfortable  HEENT: No focal deficits. Neck: No JVD, carotids 2+ no bruits. Cardiac: Regular S1S2. No S3, S4, rub, click. No murmur. Lungs: Clear to auscultation and percussion. Abdomen: Soft, non-tender. more posterior branch emerge from the circumflex artery which is medium in size and which has a 95% ostial stenosis and a 90% midvessel stenosis.  The angulation between the vein graft and the OM2 and the angulation between the OM2 and the circumflex arter Pre-hydrated.    May need PCI which may need to be staged given CRI       Aman Colon  10/2/2020  7:58 AM

## 2020-10-02 NOTE — PLAN OF CARE
A/o x4, NSR on tele. WNL on RA. Denies any pain or SOB at this time. 0.9 NS infusing @ 125 for 8 hours. Cath consents obtained and placed in chart. Groins prepped. Plan of care discussed with pt, verbalized understanding. Call light within reach.        Pro

## 2020-10-02 NOTE — PROCEDURES
[facility] Cardiology      Procedure:  , CORONARY ANGIO, SVG, LIMA ANGIO                         PERCLOSE RFA.  150 CC CONTRAST      Findings        LM: PLAQUE    LCx: PATENT PROX LCX STENT. 100% MID LCX    LAD: PATENT PROX LAD STENT. 100% MID LAD    RCA: 75% FO

## 2020-10-02 NOTE — PROGRESS NOTES
[facility] Hospitalist Progress Note                                                                   Barnett Robbie  6/21/1960    CC: FU chest pain    Interval History:  - SP Angio, feels fine s exam       Pertinent Labs:   Recent Labs   Lab 10/01/20  1437   RBC 4.87   HGB 13.1   HCT 40.7   MCV 83.6   MCH 26.9   MCHC 32.2   RDW 14.2   NEPRELIM 4.00   WBC 8.4   .0       Recent Labs   Lab 10/01/20  1437 10/02/20  0521   * 153*   BUN 38

## 2020-10-02 NOTE — RESPIRATORY THERAPY NOTE
RHETT Equipment Usage Summary :            Set Mode :CPAP W FLEX          Usage in Hours:8;34          90% Pressure (EPAP) : 11           90% Insp Pressure (IPAP);           AHI : 0.7

## 2020-10-02 NOTE — CONSULTS
Consulting Physician: Dr. Phuong Mendez    CC: Left Facial Tingling, Left Hand Tingling    HPI:  This is a 61year old male presenting for evaluation of paresthesias. Apparently, the patient was walking around the mall.   All of a sudden, he developed tingl stage 3   • S/p nephrectomy 12/27/2012    Right nephrectomy in 2010 for renal cancer    • Stroke [facility])     tia 2015   • TIA (transient ischemic attack)    • Type 2 diabetes mellitus with vascular disease ([facility] [address] 3/9/2016    cerebral    • Type II or unspecifi in 1983    Rt Inguinal   • HERNIA SURGERY  6-25-12 [facility]    Lap Repair Ventral Hernia w/mesh & Lysis of Adhesions   • KNEE ARTHROSCOPY  03/2019    RT   • KNEE ARTHROSCOPY  05/2019    LT   • LAPARO RADICAL NEPHRECTOMY Right 08/30/2010    Right nephrect on file    Tobacco Use      Smoking status: Former Smoker        Packs/day: 0.03        Years: 10.00        Pack years: .3        Types: Cigarettes        Start date: 1979        Quit date: 1988        Years since quittin.7      Smokeless toba bruits  CV: RRR, s1 and s2   LUNGS: CTAb  ABDOMEN: soft, NT, +BS  NEURO:  --HIF: A&O X 3, naming and repetition was intact  --CN: PERRLA, EOMI, VFF, no facial asymmetry, sensation intact to light touch, no tongue or palatal deviation, SCM 5/5  --Motor:  Mov

## 2020-10-02 NOTE — DIETARY NOTE
[address]     Admitting diagnosis:  Hypertension, essential [I10]  Azotemia [R79.89]  Acute chest pain [R07.9]  S/P CABG x 4 [Z95.1]  RHETT on CPAP [G47.33, Z99.89]  Left sided numbness [R20.0]  Type 2 diabetes m

## 2020-10-03 VITALS
HEART RATE: 84 BPM | DIASTOLIC BLOOD PRESSURE: 60 MMHG | SYSTOLIC BLOOD PRESSURE: 112 MMHG | OXYGEN SATURATION: 97 % | HEIGHT: 73 IN | WEIGHT: 260.56 LBS | BODY MASS INDEX: 34.53 KG/M2 | TEMPERATURE: 98 F | RESPIRATION RATE: 20 BRPM

## 2020-10-03 PROCEDURE — 80048 BASIC METABOLIC PNL TOTAL CA: CPT | Performed by: INTERNAL MEDICINE

## 2020-10-03 PROCEDURE — 82962 GLUCOSE BLOOD TEST: CPT

## 2020-10-03 RX ORDER — METOPROLOL SUCCINATE 50 MG/1
50 TABLET, EXTENDED RELEASE ORAL
Qty: 90 TABLET | Refills: 3 | Status: SHIPPED | OUTPATIENT
Start: 2020-10-04 | End: 2020-10-14

## 2020-10-03 RX ORDER — ISOSORBIDE MONONITRATE 60 MG/1
60 TABLET, EXTENDED RELEASE ORAL DAILY
Qty: 90 TABLET | Refills: 3 | Status: SHIPPED | OUTPATIENT
Start: 2020-10-04 | End: 2020-10-14 | Stop reason: ALTCHOICE

## 2020-10-03 RX ORDER — GABAPENTIN 300 MG/1
300 CAPSULE ORAL 2 TIMES DAILY
Status: SHIPPED | COMMUNITY
Start: 2020-10-03 | End: 2020-10-05

## 2020-10-03 NOTE — PLAN OF CARE
Pt. Returned to room from cath lab. Right groin is dry and intact, soft, with no drainage noted. Pt. Is sinus rhythm on monitor. Lungs clear on auscultation. Circulation intact to right leg with color pink, sensation present and warm to touch.  Pt. Has no c

## 2020-10-03 NOTE — RESPIRATORY THERAPY NOTE
RHETT - Equipment Use Daily Summary:  · Set Mode   · Usage in hours:   · 90% Pressure (EPAP) level:   · 90% Insp Pressure (IPAP):   · AHI:   · Supplemental Oxygen:  · Comments: still using

## 2020-10-03 NOTE — PROGRESS NOTES
[facility] [address] [place] [address] Cardiology Progress Note        Joshua Luizaemir Patient Status:  Observation    1960 MRN [id number]   [facility] 8NE-A Attending Jena Tracy MD   Hosp Day # 0 PCP MD Radha Brooke 18  BP: 119/53  General:  Appears comfortable  HEENT: No focal deficits. Neck: No JVD, carotids 2+ no bruits. Cardiac: Regular S1S2. No S3, S4, rub, click. No murmur. Lungs: Clear to auscultation and percussion. Abdomen: Soft, non-tender.    Emeka Koch vein artery anastomosis.  Through retrograde filling of the more proximal portion of the OM and circumflex artery, one sees a more posterior branch emerge from the circumflex artery which is medium in size and which has a 95% ostial stenosis and a 90% midv walking.  Likely orthostatic hypotension, possibly worsened by BPH medicine     3. DM , moderately controlled.      4. HTN, orthostasis noted in the past on higher dose of toprol.  Now with elevated bp's     5.  Dyslipidemia.  Very high TG's.  Already on fi

## 2020-10-03 NOTE — PROGRESS NOTES
10/03/20 0901   Clinical Encounter Type   Visited With Patient   Routine Visit Introduction  ( introduced POA discussion.  Patient will discuss with spouse and notify staff if follow-up support is needed)   Continue Visiting No  (upon request or

## 2020-10-03 NOTE — PLAN OF CARE
Assumed care of patient 10/2/2020 1930. S/P cath, (R) groin c/d/soft. No intervention. Perclose. Upon assessment patient is alert, oriented x4. O2 sats > 94% on RA. Lungs DIM-Clear. No ITZEL. RHETT/CPAP compliant at night. NSR. VSS. Afebrile. BS + x4.  BM today

## 2020-10-03 NOTE — DISCHARGE SUMMARY
General Medicine Discharge Summary     Patient ID:  Mariah Hurtado  61year old  6/21/1960    Admit date: 10/1/2020    Discharge date and time: 10/3/20    Attending Physician: Masha Veliz MD     Primary Care Physician: Lily Richey MD     Reason for metoprolol, imdur, home losartan     # BPH: Home meds     # CKD: renal function around baseline, monitor, IVF given prior to angio     Consults: IP CONSULT TO CARDIOLOGY  IP CONSULT TO HOSPITALIST  IP CONSULT TO NEUROLOGY  IP CONSULT TO Reyna Snell DAILY.     AMITRIPTYLINE HCL 25 MG Oral Tab  TAKE 1 TABLET BY MOUTH EVERY DAY AT NIGHT    CLOPIDOGREL BISULFATE 75 MG Oral Tab  TAKE 1 TABLET BY MOUTH EVERY DAY    Semaglutide, 1 MG/DOSE, (OZEMPIC, 1 MG/DOSE,) 2 MG/1.5ML Subcutaneous Solution Pen-injector

## 2020-10-04 NOTE — PLAN OF CARE
Pt. Is alert and oriented times four. Lungs clear on ausculation. Pt. Has no c/o pain. Right groin is dry and intact with circulation intact to right leg. Pt. Is sinus rhythm on monitor. Plan for discharge today.

## 2020-10-04 NOTE — PLAN OF CARE
NURSING DISCHARGE NOTE    Discharged Home via Wheelchair. Accompanied by Support staff  Belongings Taken by patient/family. Reviewed post cath instructions with patient. Also talked about potential side effects of isordil.

## 2020-10-05 NOTE — PAYOR COMM NOTE
--------------  ADMISSION REVIEW     Payor: Shlomo Deluna  #:  [id number]  Authorization Number: [id number]    Admit date: N/A  Admit time: N/A       Admitting Physician: Jena Tracy MD  Attending Physician:  No att. providers Diabetic neuropathy, [facility]    • Diabetic retinopathy     Sees Dr. Radha George   • Endocrine disorder    • Genitourinary disease    • Heart attack [facility])    • HEART MURMUR    • High blood pressure    • High cholesterol    • History of blo • APPENDECTOMY  7/30/2010   • ARTHROSCOPY OF JOINT UNLISTED Bilateral    • BYPASS SURGERY  10/2/13    Coronary revascularization x4, left internal mammary artery graft to the LAD, saphenous vein graft to the diagonal, second obtuse marginal, and acute ma Smokeless tobacco: Former User    Alcohol use: Not Currently      Comment: once per year    Drug use: No             Review of Systems    Positive for stated complaint: numbness to r arm r/o stroke  Other systems are as noted in HPI.   Constitutional and vi ------                     CBC W/ DIFFERENTIAL[[id number]]                              Final result                 Please view results for these tests on the individual orders.    COMP METABOLIC PANEL (14)   TROPONIN I   URINALYSIS WITH CULTURE REFLEX   RA Note      H&P signed by Valencia Boyd MD at 10/1/2020  6:49 PM     Author: Valencia Boyd MD Service: Hospitalist Author Type: Physician    Filed: 10/1/2020  6:49 PM Date of Service: 10/1/2020  6:42 PM Status: Signed    : Cecilia Manuel or localized, unspecified site     bilateral hips, bone spurs left hip   • Other and unspecified hyperlipidemia    • Personal history of other musculoskeletal disorders(V13.59) 1970's    fractured both elbows   • Personal history of other musculoskeletal d Take 1 tablet (5 mg total) by mouth 3 (three) times daily as needed for Muscle spasms. , Disp: 45 tablet, Rfl: 5    •  Blood Glucose Monitoring Suppl (ACCU-CHEK SHEBA PLUS) w/Device Does not apply Kit, Test 5 times daily, Disp: 1 kit, Rfl: 0    •  Glucose B ipratropium-albuterol 0.5-2.5 (3) MG/3ML Inhalation Solution, Take 3 mL by nebulization every 6 (six) hours as needed. , Disp: 360 mL, Rfl: 2    •  Albuterol Sulfate  (90 Base) MCG/ACT Inhalation Aero Soln, Inhale 2 puffs into the lungs every 4 (four illicts    Fam Hx  Family History   Problem Relation Age of Onset   • Heart Surgery Father    • Heart Disease Father    • Cancer Father         Esophageal cancer   • Heart Disorder Father         CABG at age 46s   • Diabetes Father         Type 2 DM   • Ot 10/01/2020    ALB 3.4 10/01/2020    ALKPHO 71 10/01/2020    BILT 0.3 10/01/2020    TP 7.5 10/01/2020    AST 35 10/01/2020    ALT 46 10/01/2020    TROP <0.045 10/01/2020    PGLU 154 10/01/2020       Above labs reviewed.     Radiology:    I independently revi and acute marginal branch of the right. on 10/2/13.   EMILY RCA, 2017.  EMILY, prox LCx/OM1, 2017.  EMILY LAD, 7/2017.  He reports effort-related chest tightness and dyspnea.  Cath, [place], 7/2019: stenotic RCA---> EMILY RCA, 10/2019     2.  Syncope 10/2017, in Labs:       Recent Labs   Lab 10/01/20  1437   RBC 4.87   HGB 13.1   HCT 40.7   MCV 83.6   MCH 26.9   MCHC 32.2   RDW 14.2   NEPRELIM 4.00   WBC 8.4   .0              Recent Labs   Lab 10/01/20  1437 10/02/20  0521   * 153*   BUN 38* 31*   CR himself again, the symptoms returned. He denied any double vision, slurred speech, facial droop, motor weakness, or balance problems. Assessment      1.   Left Facial Paresthesia, Left Hand Paresthesia  - likely secondary to angina (especially since mulugeta rub  Abdomen: + Bs, soft NTND, no HSM  Ext: warm to touch, no edema  Neuro: no focal deficits    Hospital Course:   Patient is a 61year old male with PMH sig for extensive CAD, Afib, CKD, IDDM with hyperglycemia, HTN, obesity, hx of TIA here with chest pa CHANGED    ROSUVASTATIN CALCIUM 40 MG Oral Tab  TAKE 1 TABLET BY MOUTH EVERY DAY AT NIGHT    losartan Potassium 50 MG Oral Tab  Take 1 tablet (50 mg total) by mouth daily.     Alfuzosin HCl ER 10 MG Oral Tablet 24 Hr  Take 1 tablet (10 mg total) by mouth da needed. Albuterol Sulfate  (90 Base) MCG/ACT Inhalation Aero Soln  Inhale 2 puffs into the lungs every 4 (four) hours as needed for Wheezing or Shortness of Breath.     Ferrous Sulfate 324 (65 Fe) MG Oral Tab EC  Take 324 mg/day by mouth every oth

## 2020-10-05 NOTE — PROCEDURES
[facility]    PATIENT'S NAME: Marmaritza Andie   ATTENDING PHYSICIAN: Isauro Darling M.D. OPERATING PHYSICIAN: Areta Schlatter, M.D.    PATIENT ACCOUNT#:   [de-identified]    LOCATION:  [address] [place] [address]  MEDICAL RECORD #:   [id number]       [facility] well as a medium diagonal artery before it is occluded 100%. The proximal LAD stent is patent with narrowing of about 25% to 30% in the midportion of the stent.   During left main injections, one sees collateral filling of a branch of the right coronary At the end of the procedure, the right femoral artery puncture site was closed using a Perclose device without difficulty.     IMPRESSION:  Coronary disease as described above with patency of 3 grafts, the LIMA to the LAD, a vein graft to the OM, and a vei

## 2020-10-19 ENCOUNTER — LAB ENCOUNTER (OUTPATIENT)
Dept: LAB | Age: 60
End: 2020-10-19
Attending: INTERNAL MEDICINE
Payer: COMMERCIAL

## 2020-10-19 DIAGNOSIS — D64.9 ANEMIA, UNSPECIFIED TYPE: ICD-10-CM

## 2020-10-19 DIAGNOSIS — E11.51 TYPE 2 DIABETES MELLITUS WITH PERIPHERAL VASCULAR DISEASE (HCC): ICD-10-CM

## 2020-10-19 DIAGNOSIS — N18.32 STAGE 3B CHRONIC KIDNEY DISEASE (HCC): ICD-10-CM

## 2020-10-19 DIAGNOSIS — Z79.4 UNCONTROLLED TYPE 2 DIABETES MELLITUS WITH MILD NONPROLIFERATIVE RETINOPATHY WITHOUT MACULAR EDEMA, WITH LONG-TERM CURRENT USE OF INSULIN (HCC): ICD-10-CM

## 2020-10-19 DIAGNOSIS — Z79.4 TYPE 2 DIABETES MELLITUS WITH STAGE 3 CHRONIC KIDNEY DISEASE, WITH LONG-TERM CURRENT USE OF INSULIN, UNSPECIFIED WHETHER STAGE 3A OR 3B CKD (HCC): ICD-10-CM

## 2020-10-19 DIAGNOSIS — E11.65 TYPE 2 DIABETES, UNCONTROLLED, WITH NEUROPATHY (HCC): ICD-10-CM

## 2020-10-19 DIAGNOSIS — E11.3299 UNCONTROLLED TYPE 2 DIABETES MELLITUS WITH MILD NONPROLIFERATIVE RETINOPATHY WITHOUT MACULAR EDEMA, WITH LONG-TERM CURRENT USE OF INSULIN (HCC): ICD-10-CM

## 2020-10-19 DIAGNOSIS — E11.22 TYPE 2 DIABETES MELLITUS WITH STAGE 3 CHRONIC KIDNEY DISEASE, WITH LONG-TERM CURRENT USE OF INSULIN, UNSPECIFIED WHETHER STAGE 3A OR 3B CKD (HCC): ICD-10-CM

## 2020-10-19 DIAGNOSIS — E11.40 TYPE 2 DIABETES, UNCONTROLLED, WITH NEUROPATHY (HCC): ICD-10-CM

## 2020-10-19 DIAGNOSIS — N18.30 STAGE 3 CHRONIC KIDNEY DISEASE (HCC): ICD-10-CM

## 2020-10-19 DIAGNOSIS — R60.9 EDEMA, UNSPECIFIED TYPE: ICD-10-CM

## 2020-10-19 DIAGNOSIS — E11.3393 MODERATE NONPROLIFERATIVE DIABETIC RETINOPATHY OF BOTH EYES WITHOUT MACULAR EDEMA ASSOCIATED WITH TYPE 2 DIABETES MELLITUS (HCC): ICD-10-CM

## 2020-10-19 DIAGNOSIS — E78.5 HYPERLIPIDEMIA, UNSPECIFIED HYPERLIPIDEMIA TYPE: ICD-10-CM

## 2020-10-19 DIAGNOSIS — N18.30 TYPE 2 DIABETES MELLITUS WITH STAGE 3 CHRONIC KIDNEY DISEASE, WITH LONG-TERM CURRENT USE OF INSULIN, UNSPECIFIED WHETHER STAGE 3A OR 3B CKD (HCC): ICD-10-CM

## 2020-10-19 DIAGNOSIS — Z90.5 S/P NEPHRECTOMY: ICD-10-CM

## 2020-10-19 DIAGNOSIS — E11.65 UNCONTROLLED TYPE 2 DIABETES MELLITUS WITH MILD NONPROLIFERATIVE RETINOPATHY WITHOUT MACULAR EDEMA, WITH LONG-TERM CURRENT USE OF INSULIN (HCC): ICD-10-CM

## 2020-10-19 DIAGNOSIS — E55.9 VITAMIN D INSUFFICIENCY: ICD-10-CM

## 2020-10-19 DIAGNOSIS — R80.9 PROTEINURIA, UNSPECIFIED TYPE: ICD-10-CM

## 2020-10-19 DIAGNOSIS — E11.42 TYPE 2 DIABETES MELLITUS WITH PERIPHERAL NEUROPATHY (HCC): ICD-10-CM

## 2020-10-19 PROCEDURE — 82570 ASSAY OF URINE CREATININE: CPT

## 2020-10-19 PROCEDURE — 80069 RENAL FUNCTION PANEL: CPT

## 2020-10-19 PROCEDURE — 80061 LIPID PANEL: CPT

## 2020-10-19 PROCEDURE — 84156 ASSAY OF PROTEIN URINE: CPT | Performed by: INTERNAL MEDICINE

## 2020-10-19 PROCEDURE — 83540 ASSAY OF IRON: CPT

## 2020-10-19 PROCEDURE — 83550 IRON BINDING TEST: CPT

## 2020-10-19 PROCEDURE — 82306 VITAMIN D 25 HYDROXY: CPT

## 2020-10-19 PROCEDURE — 86335 IMMUNFIX E-PHORSIS/URINE/CSF: CPT | Performed by: INTERNAL MEDICINE

## 2020-10-19 PROCEDURE — 85025 COMPLETE CBC W/AUTO DIFF WBC: CPT

## 2020-10-19 PROCEDURE — 83883 ASSAY NEPHELOMETRY NOT SPEC: CPT | Performed by: INTERNAL MEDICINE

## 2020-10-19 PROCEDURE — 83970 ASSAY OF PARATHORMONE: CPT

## 2020-10-19 PROCEDURE — 84156 ASSAY OF PROTEIN URINE: CPT

## 2020-10-19 PROCEDURE — 36415 COLL VENOUS BLD VENIPUNCTURE: CPT

## 2020-10-19 PROCEDURE — 82728 ASSAY OF FERRITIN: CPT

## 2020-10-19 PROCEDURE — 83735 ASSAY OF MAGNESIUM: CPT

## 2020-10-20 NOTE — PROGRESS NOTES
Kidney function slightly worse but near his baseline still. Make sure hydrating enough  Protein in the urine is worse, this is likely due to diabetes and salt.  Make sure you are following a strict low salt diet   Vitamin d and pth are normal   Iron levels

## 2020-11-13 ENCOUNTER — LAB ENCOUNTER (OUTPATIENT)
Dept: LAB | Age: 60
End: 2020-11-13
Attending: INTERNAL MEDICINE
Payer: COMMERCIAL

## 2020-11-13 DIAGNOSIS — Z01.812 PRE-PROCEDURE LAB EXAM: Primary | ICD-10-CM

## 2020-11-16 ENCOUNTER — HOSPITAL ENCOUNTER (OUTPATIENT)
Dept: INTERVENTIONAL RADIOLOGY/VASCULAR | Facility: HOSPITAL | Age: 60
Discharge: HOME | End: 2020-11-16
Attending: INTERNAL MEDICINE | Admitting: INTERNAL MEDICINE
Payer: COMMERCIAL

## 2020-11-16 VITALS
OXYGEN SATURATION: 94 % | WEIGHT: 255 LBS | HEART RATE: 90 BPM | SYSTOLIC BLOOD PRESSURE: 137 MMHG | HEIGHT: 73 IN | BODY MASS INDEX: 33.8 KG/M2 | RESPIRATION RATE: 16 BRPM | DIASTOLIC BLOOD PRESSURE: 120 MMHG

## 2020-11-16 DIAGNOSIS — R94.39 ABNORMAL STRESS TEST: Primary | ICD-10-CM

## 2020-11-16 DIAGNOSIS — R94.39 ABNORMAL NUCLEAR STRESS TEST: ICD-10-CM

## 2020-11-16 PROCEDURE — 96360 HYDRATION IV INFUSION INIT: CPT

## 2020-11-16 PROCEDURE — 02703ZZ DILATION OF CORONARY ARTERY, ONE ARTERY, PERCUTANEOUS APPROACH: ICD-10-PCS | Performed by: INTERNAL MEDICINE

## 2020-11-16 PROCEDURE — 99153 MOD SED SAME PHYS/QHP EA: CPT

## 2020-11-16 PROCEDURE — 82962 GLUCOSE BLOOD TEST: CPT

## 2020-11-16 PROCEDURE — 99152 MOD SED SAME PHYS/QHP 5/>YRS: CPT

## 2020-11-16 PROCEDURE — 93005 ELECTROCARDIOGRAM TRACING: CPT

## 2020-11-16 PROCEDURE — 94660 CPAP INITIATION&MGMT: CPT

## 2020-11-16 PROCEDURE — 99211 OFF/OP EST MAY X REQ PHY/QHP: CPT

## 2020-11-16 PROCEDURE — 93010 ELECTROCARDIOGRAM REPORT: CPT | Performed by: INTERNAL MEDICINE

## 2020-11-16 PROCEDURE — 92920 PRQ TRLUML C ANGIOP 1ART&/BR: CPT

## 2020-11-16 PROCEDURE — 96361 HYDRATE IV INFUSION ADD-ON: CPT

## 2020-11-16 RX ORDER — SODIUM CHLORIDE 9 MG/ML
INJECTION, SOLUTION INTRAVENOUS
Status: DISCONTINUED | OUTPATIENT
Start: 2020-11-17 | End: 2020-11-16 | Stop reason: HOSPADM

## 2020-11-16 RX ORDER — SODIUM CHLORIDE 9 MG/ML
INJECTION, SOLUTION INTRAVENOUS CONTINUOUS
Status: DISCONTINUED | OUTPATIENT
Start: 2020-11-16 | End: 2020-11-16

## 2020-11-16 RX ORDER — LIDOCAINE HYDROCHLORIDE 10 MG/ML
INJECTION, SOLUTION EPIDURAL; INFILTRATION; INTRACAUDAL; PERINEURAL
Status: COMPLETED
Start: 2020-11-16 | End: 2020-11-16

## 2020-11-16 RX ORDER — ASPIRIN 81 MG/1
81 TABLET ORAL DAILY
Status: DISCONTINUED | OUTPATIENT
Start: 2020-11-17 | End: 2020-11-16

## 2020-11-16 RX ORDER — MIDAZOLAM HYDROCHLORIDE 1 MG/ML
INJECTION INTRAMUSCULAR; INTRAVENOUS
Status: COMPLETED
Start: 2020-11-16 | End: 2020-11-16

## 2020-11-16 RX ORDER — CLOPIDOGREL BISULFATE 75 MG/1
75 TABLET ORAL DAILY
Status: DISCONTINUED | OUTPATIENT
Start: 2020-11-17 | End: 2020-11-16

## 2020-11-16 RX ORDER — HEPARIN SODIUM 5000 [USP'U]/ML
INJECTION, SOLUTION INTRAVENOUS; SUBCUTANEOUS
Status: COMPLETED
Start: 2020-11-16 | End: 2020-11-16

## 2020-11-16 RX ORDER — BIVALIRUDIN 250 MG/5ML
INJECTION, POWDER, LYOPHILIZED, FOR SOLUTION INTRAVENOUS
Status: COMPLETED
Start: 2020-11-16 | End: 2020-11-16

## 2020-11-16 RX ADMIN — SODIUM CHLORIDE: 9 INJECTION, SOLUTION INTRAVENOUS at 11:00:00

## 2020-11-16 NOTE — PROCEDURES
[facility]    PATIENT'S NAME: Thompson Taveras   ATTENDING PHYSICIAN: Jaymie Palm M.D. OPERATING PHYSICIAN: Jaymie Palm M.D.    PATIENT ACCOUNT#:   [de-identified]    LOCATION:  [address]  MEDICAL RECORD #:   [id number] difficulty. IMPRESSION:  Successful angioplasty of the mid RCA.     Dictated By Jennifer Velasquez M.D.  d: 11/16/2020 10:39:19  t: 11/16/2020 10:53:24  [facility] [id number]  CJG/

## 2020-11-16 NOTE — PROCEDURES
[facility] Cardiology      Procedure:  PTCA RCA                         PERCLOSE RFA          RESULTS: PTCA MID RCA, 80% TO 0% WITH 3.5 X 20 MM NC BALLOON TO 16 CHRIS

## 2020-11-16 NOTE — H&P
[facility] Cardiology Consultation    [facility] Patient Status:  Outpatient in a Bed    1960 MRN [id number]   [facility] Day # 0 PCP Jimena Marie MD           History RENAL DISEASE    • Renal disorder     ckd stage 3   • S/p nephrectomy 12/27/2012    Right nephrectomy in 2010 for renal cancer    • Stroke [facility])     tia 2015   • TIA (transient ischemic attack)    • Type 2 diabetes mellitus with vascular disease ([facility] 3/9/ Cesar Marie MD at . [address]  in 65 Rhiannon Lipscomb Inguinal   • HERNIA SURGERY  6-25-12 [facility]    Lap Repair Ventral Hernia w/mesh & Lysis of Adhesions   • KNEE ARTHROSCOPY  03/2019    RT   • KNEE ARTHROSCOPY  05/2019    LT   • LAPARO RADICAL NEPH smokeless tobacco. He reports previous alcohol use. He reports that he does not use drugs. Review of Systems:  All systems were reviewed and are negative except as described above in HPI.     Physical Exam:      Wt Readings from Last 3 Encounters:  11/12 dyspnea. Cath, [place], 7/2019: stenotic RCA---> EMILY RCA, 10/2019. Re-cath, 10/2020 for CP showed narrowing in distal LAD, occluded PLB-RCA, (old), and 70% prox RCA, in stent. some effort-related angina with more strenuous walking.      2.  Syncope 10/2

## 2020-11-16 NOTE — CARDIAC REHAB
Cardiac rehab education completed with patient  Patient referred to cardiac rehab  Patient will be called once the department reopens to schedule cardiac rehab.

## 2020-11-16 NOTE — PROGRESS NOTES
Patient had same day PCI. Right groin perclose site soft, CDI, no hematoma, +3 pedal pulse. VSS. Patient denies any pain. Wife @ bedside. Dr. Meryl Wheat @ bedside. EKG completed. Patient completed flat BR. HOB elevated. Patient tolerating po intake.  Cardiac rehab

## 2020-11-16 NOTE — DIETARY NOTE
Clinical Nutrition    Dietitian consult received per cardiac rehab standing order. Pt to be educated by cardiac rehab staff and encouraged to attend outpatient classes taught by RD. RD available PRN.      Hillary Williamson MS, RD, LDN  Clinical Dietitian  Pag

## 2021-01-18 PROBLEM — R79.89 AZOTEMIA: Status: RESOLVED | Noted: 2020-10-01 | Resolved: 2021-01-18

## 2021-01-18 PROBLEM — S83.242A ACUTE MEDIAL MENISCUS TEAR OF LEFT KNEE: Status: RESOLVED | Noted: 2019-06-11 | Resolved: 2021-01-18

## 2021-01-18 PROBLEM — S83.241A ACUTE MEDIAL MENISCUS TEAR OF RIGHT KNEE: Status: RESOLVED | Noted: 2019-02-22 | Resolved: 2021-01-18

## 2021-01-18 PROCEDURE — 3052F HG A1C>EQUAL 8.0%<EQUAL 9.0%: CPT | Performed by: INTERNAL MEDICINE

## 2021-01-20 ENCOUNTER — CARDPULM VISIT (OUTPATIENT)
Dept: CARDIAC REHAB | Facility: HOSPITAL | Age: 61
End: 2021-01-20
Attending: INTERNAL MEDICINE
Payer: COMMERCIAL

## 2021-01-20 LAB
GLUCOSE BLD-MCNC: 220 MG/DL (ref 70–99)
GLUCOSE BLD-MCNC: 241 MG/DL (ref 70–99)

## 2021-01-20 PROCEDURE — 93798 PHYS/QHP OP CAR RHAB W/ECG: CPT

## 2021-01-20 PROCEDURE — 82962 GLUCOSE BLOOD TEST: CPT

## 2021-02-01 ENCOUNTER — CARDPULM VISIT (OUTPATIENT)
Dept: CARDIAC REHAB | Facility: HOSPITAL | Age: 61
End: 2021-02-01
Attending: INTERNAL MEDICINE
Payer: COMMERCIAL

## 2021-02-01 PROCEDURE — 93798 PHYS/QHP OP CAR RHAB W/ECG: CPT

## 2021-02-03 ENCOUNTER — CARDPULM VISIT (OUTPATIENT)
Dept: CARDIAC REHAB | Facility: HOSPITAL | Age: 61
End: 2021-02-03
Attending: INTERNAL MEDICINE
Payer: COMMERCIAL

## 2021-02-03 PROCEDURE — 93798 PHYS/QHP OP CAR RHAB W/ECG: CPT

## 2021-02-05 ENCOUNTER — CARDPULM VISIT (OUTPATIENT)
Dept: CARDIAC REHAB | Facility: HOSPITAL | Age: 61
End: 2021-02-05
Attending: INTERNAL MEDICINE
Payer: COMMERCIAL

## 2021-02-05 PROCEDURE — 93798 PHYS/QHP OP CAR RHAB W/ECG: CPT

## 2021-02-08 ENCOUNTER — CARDPULM VISIT (OUTPATIENT)
Dept: CARDIAC REHAB | Facility: HOSPITAL | Age: 61
End: 2021-02-08
Attending: INTERNAL MEDICINE
Payer: COMMERCIAL

## 2021-02-08 PROCEDURE — 93798 PHYS/QHP OP CAR RHAB W/ECG: CPT

## 2021-02-10 ENCOUNTER — APPOINTMENT (OUTPATIENT)
Dept: CARDIAC REHAB | Facility: HOSPITAL | Age: 61
End: 2021-02-10
Attending: INTERNAL MEDICINE
Payer: COMMERCIAL

## 2021-02-12 ENCOUNTER — APPOINTMENT (OUTPATIENT)
Dept: CARDIAC REHAB | Facility: HOSPITAL | Age: 61
End: 2021-02-12
Attending: INTERNAL MEDICINE
Payer: COMMERCIAL

## 2021-02-15 ENCOUNTER — APPOINTMENT (OUTPATIENT)
Dept: CARDIAC REHAB | Facility: HOSPITAL | Age: 61
End: 2021-02-15
Attending: INTERNAL MEDICINE
Payer: COMMERCIAL

## 2021-02-17 ENCOUNTER — APPOINTMENT (OUTPATIENT)
Dept: CARDIAC REHAB | Facility: HOSPITAL | Age: 61
End: 2021-02-17
Attending: INTERNAL MEDICINE
Payer: COMMERCIAL

## 2021-02-19 ENCOUNTER — APPOINTMENT (OUTPATIENT)
Dept: CARDIAC REHAB | Facility: HOSPITAL | Age: 61
End: 2021-02-19
Attending: INTERNAL MEDICINE
Payer: COMMERCIAL

## 2021-02-22 ENCOUNTER — APPOINTMENT (OUTPATIENT)
Dept: CARDIAC REHAB | Facility: HOSPITAL | Age: 61
End: 2021-02-22
Attending: INTERNAL MEDICINE
Payer: COMMERCIAL

## 2021-02-24 ENCOUNTER — APPOINTMENT (OUTPATIENT)
Dept: CARDIAC REHAB | Facility: HOSPITAL | Age: 61
End: 2021-02-24
Attending: INTERNAL MEDICINE
Payer: COMMERCIAL

## 2021-02-25 DIAGNOSIS — E11.40 TYPE 2 DIABETES, UNCONTROLLED, WITH NEUROPATHY (HCC): ICD-10-CM

## 2021-02-25 DIAGNOSIS — E11.65 TYPE 2 DIABETES, UNCONTROLLED, WITH NEUROPATHY (HCC): ICD-10-CM

## 2021-02-25 DIAGNOSIS — R20.2 PARESTHESIAS: ICD-10-CM

## 2021-02-25 NOTE — TELEPHONE ENCOUNTER
Medication: Gabapentin 300 mg     Date of last refill: 10/05/20   [place]      Last office visit: 3/5/2020  Due back to clinic per last office note: RTN in 6 months  Date next office visit scheduled:      Last OV note having intermittent blurry vision after taking gabapentin - will reduce to 300 mg bid and monitor for changes.      (E11.40,  E11.65) Type 2 diabetes, uncontrolled, with neuropathy (HCC)  (primary encounter diagnosis)  Plan: gabapentin 300 MG Oral Cap

## 2021-02-26 ENCOUNTER — APPOINTMENT (OUTPATIENT)
Dept: CARDIAC REHAB | Facility: HOSPITAL | Age: 61
End: 2021-02-26
Attending: INTERNAL MEDICINE
Payer: COMMERCIAL

## 2021-02-26 RX ORDER — GABAPENTIN 300 MG/1
CAPSULE ORAL
Qty: 120 CAPSULE | Refills: 0 | Status: SHIPPED | OUTPATIENT
Start: 2021-02-26 | End: 2021-02-27

## 2021-03-01 ENCOUNTER — APPOINTMENT (OUTPATIENT)
Dept: CARDIAC REHAB | Facility: HOSPITAL | Age: 61
End: 2021-03-01
Attending: INTERNAL MEDICINE
Payer: COMMERCIAL

## 2021-03-03 ENCOUNTER — APPOINTMENT (OUTPATIENT)
Dept: CARDIAC REHAB | Facility: HOSPITAL | Age: 61
End: 2021-03-03
Attending: INTERNAL MEDICINE
Payer: COMMERCIAL

## 2021-03-05 ENCOUNTER — APPOINTMENT (OUTPATIENT)
Dept: CARDIAC REHAB | Facility: HOSPITAL | Age: 61
End: 2021-03-05
Attending: INTERNAL MEDICINE
Payer: COMMERCIAL

## 2021-03-08 ENCOUNTER — APPOINTMENT (OUTPATIENT)
Dept: CARDIAC REHAB | Facility: HOSPITAL | Age: 61
End: 2021-03-08
Attending: INTERNAL MEDICINE
Payer: COMMERCIAL

## 2021-03-10 ENCOUNTER — APPOINTMENT (OUTPATIENT)
Dept: CARDIAC REHAB | Facility: HOSPITAL | Age: 61
End: 2021-03-10
Attending: INTERNAL MEDICINE
Payer: COMMERCIAL

## 2021-03-12 ENCOUNTER — APPOINTMENT (OUTPATIENT)
Dept: CARDIAC REHAB | Facility: HOSPITAL | Age: 61
End: 2021-03-12
Attending: INTERNAL MEDICINE
Payer: COMMERCIAL

## 2021-03-15 ENCOUNTER — APPOINTMENT (OUTPATIENT)
Dept: CARDIAC REHAB | Facility: HOSPITAL | Age: 61
End: 2021-03-15
Attending: INTERNAL MEDICINE
Payer: COMMERCIAL

## 2021-03-17 ENCOUNTER — APPOINTMENT (OUTPATIENT)
Dept: CARDIAC REHAB | Facility: HOSPITAL | Age: 61
End: 2021-03-17
Attending: INTERNAL MEDICINE
Payer: COMMERCIAL

## 2021-03-17 DIAGNOSIS — Z23 NEED FOR VACCINATION: ICD-10-CM

## 2021-03-19 ENCOUNTER — APPOINTMENT (OUTPATIENT)
Dept: CARDIAC REHAB | Facility: HOSPITAL | Age: 61
End: 2021-03-19
Attending: INTERNAL MEDICINE
Payer: COMMERCIAL

## 2021-03-22 ENCOUNTER — APPOINTMENT (OUTPATIENT)
Dept: CARDIAC REHAB | Facility: HOSPITAL | Age: 61
End: 2021-03-22
Attending: INTERNAL MEDICINE
Payer: COMMERCIAL

## 2021-03-24 ENCOUNTER — APPOINTMENT (OUTPATIENT)
Dept: CARDIAC REHAB | Facility: HOSPITAL | Age: 61
End: 2021-03-24
Attending: INTERNAL MEDICINE
Payer: COMMERCIAL

## 2021-03-26 ENCOUNTER — APPOINTMENT (OUTPATIENT)
Dept: CARDIAC REHAB | Facility: HOSPITAL | Age: 61
End: 2021-03-26
Attending: INTERNAL MEDICINE
Payer: COMMERCIAL

## 2021-03-29 ENCOUNTER — APPOINTMENT (OUTPATIENT)
Dept: CARDIAC REHAB | Facility: HOSPITAL | Age: 61
End: 2021-03-29
Attending: INTERNAL MEDICINE
Payer: COMMERCIAL

## 2021-03-31 ENCOUNTER — APPOINTMENT (OUTPATIENT)
Dept: CARDIAC REHAB | Facility: HOSPITAL | Age: 61
End: 2021-03-31
Attending: INTERNAL MEDICINE
Payer: COMMERCIAL

## 2021-04-02 ENCOUNTER — APPOINTMENT (OUTPATIENT)
Dept: CARDIAC REHAB | Facility: HOSPITAL | Age: 61
End: 2021-04-02
Attending: INTERNAL MEDICINE
Payer: COMMERCIAL

## 2021-04-05 ENCOUNTER — APPOINTMENT (OUTPATIENT)
Dept: CARDIAC REHAB | Facility: HOSPITAL | Age: 61
End: 2021-04-05
Attending: INTERNAL MEDICINE
Payer: COMMERCIAL

## 2021-04-07 ENCOUNTER — APPOINTMENT (OUTPATIENT)
Dept: CARDIAC REHAB | Facility: HOSPITAL | Age: 61
End: 2021-04-07
Attending: INTERNAL MEDICINE
Payer: COMMERCIAL

## 2021-04-09 ENCOUNTER — APPOINTMENT (OUTPATIENT)
Dept: CARDIAC REHAB | Facility: HOSPITAL | Age: 61
End: 2021-04-09
Attending: INTERNAL MEDICINE
Payer: COMMERCIAL

## 2021-04-12 ENCOUNTER — APPOINTMENT (OUTPATIENT)
Dept: CARDIAC REHAB | Facility: HOSPITAL | Age: 61
End: 2021-04-12
Attending: INTERNAL MEDICINE
Payer: COMMERCIAL

## 2021-04-14 ENCOUNTER — APPOINTMENT (OUTPATIENT)
Dept: CARDIAC REHAB | Facility: HOSPITAL | Age: 61
End: 2021-04-14
Attending: INTERNAL MEDICINE
Payer: COMMERCIAL

## 2021-04-16 ENCOUNTER — APPOINTMENT (OUTPATIENT)
Dept: CARDIAC REHAB | Facility: HOSPITAL | Age: 61
End: 2021-04-16
Attending: INTERNAL MEDICINE
Payer: COMMERCIAL

## 2021-04-19 ENCOUNTER — APPOINTMENT (OUTPATIENT)
Dept: CARDIAC REHAB | Facility: HOSPITAL | Age: 61
End: 2021-04-19
Attending: INTERNAL MEDICINE
Payer: COMMERCIAL

## 2021-04-21 ENCOUNTER — APPOINTMENT (OUTPATIENT)
Dept: CARDIAC REHAB | Facility: HOSPITAL | Age: 61
End: 2021-04-21
Attending: INTERNAL MEDICINE
Payer: COMMERCIAL

## 2021-04-21 PROCEDURE — 3061F NEG MICROALBUMINURIA REV: CPT | Performed by: INTERNAL MEDICINE

## 2021-04-23 ENCOUNTER — APPOINTMENT (OUTPATIENT)
Dept: CARDIAC REHAB | Facility: HOSPITAL | Age: 61
End: 2021-04-23
Attending: INTERNAL MEDICINE
Payer: COMMERCIAL

## 2021-04-26 ENCOUNTER — APPOINTMENT (OUTPATIENT)
Dept: CARDIAC REHAB | Facility: HOSPITAL | Age: 61
End: 2021-04-26
Attending: INTERNAL MEDICINE
Payer: COMMERCIAL

## 2021-04-28 ENCOUNTER — APPOINTMENT (OUTPATIENT)
Dept: CARDIAC REHAB | Facility: HOSPITAL | Age: 61
End: 2021-04-28
Attending: INTERNAL MEDICINE
Payer: COMMERCIAL

## 2021-04-30 ENCOUNTER — APPOINTMENT (OUTPATIENT)
Dept: CARDIAC REHAB | Facility: HOSPITAL | Age: 61
End: 2021-04-30
Attending: INTERNAL MEDICINE
Payer: COMMERCIAL

## 2021-05-03 ENCOUNTER — APPOINTMENT (OUTPATIENT)
Dept: CARDIAC REHAB | Facility: HOSPITAL | Age: 61
End: 2021-05-03
Attending: INTERNAL MEDICINE
Payer: COMMERCIAL

## 2021-05-05 ENCOUNTER — APPOINTMENT (OUTPATIENT)
Dept: CARDIAC REHAB | Facility: HOSPITAL | Age: 61
End: 2021-05-05
Attending: INTERNAL MEDICINE
Payer: COMMERCIAL

## 2021-05-07 ENCOUNTER — APPOINTMENT (OUTPATIENT)
Dept: CARDIAC REHAB | Facility: HOSPITAL | Age: 61
End: 2021-05-07
Attending: INTERNAL MEDICINE
Payer: COMMERCIAL

## 2021-05-10 ENCOUNTER — APPOINTMENT (OUTPATIENT)
Dept: CARDIAC REHAB | Facility: HOSPITAL | Age: 61
End: 2021-05-10
Attending: INTERNAL MEDICINE
Payer: COMMERCIAL

## 2021-05-13 ENCOUNTER — APPOINTMENT (OUTPATIENT)
Dept: GENERAL RADIOLOGY | Facility: HOSPITAL | Age: 61
End: 2021-05-13
Payer: COMMERCIAL

## 2021-05-13 ENCOUNTER — HOSPITAL ENCOUNTER (EMERGENCY)
Facility: HOSPITAL | Age: 61
Discharge: HOME | End: 2021-05-13
Attending: EMERGENCY MEDICINE
Payer: COMMERCIAL

## 2021-05-13 VITALS
RESPIRATION RATE: 18 BRPM | HEIGHT: 73 IN | DIASTOLIC BLOOD PRESSURE: 58 MMHG | WEIGHT: 255 LBS | SYSTOLIC BLOOD PRESSURE: 108 MMHG | BODY MASS INDEX: 33.8 KG/M2 | OXYGEN SATURATION: 93 % | HEART RATE: 85 BPM | TEMPERATURE: 99 F

## 2021-05-13 DIAGNOSIS — R07.89 CHEST PAIN, ATYPICAL: Primary | ICD-10-CM

## 2021-05-13 PROCEDURE — 71045 X-RAY EXAM CHEST 1 VIEW: CPT | Performed by: EMERGENCY MEDICINE

## 2021-05-13 PROCEDURE — 99284 EMERGENCY DEPT VISIT MOD MDM: CPT

## 2021-05-13 PROCEDURE — 36415 COLL VENOUS BLD VENIPUNCTURE: CPT

## 2021-05-13 PROCEDURE — 93005 ELECTROCARDIOGRAM TRACING: CPT

## 2021-05-13 PROCEDURE — 80053 COMPREHEN METABOLIC PANEL: CPT | Performed by: EMERGENCY MEDICINE

## 2021-05-13 PROCEDURE — 84484 ASSAY OF TROPONIN QUANT: CPT | Performed by: EMERGENCY MEDICINE

## 2021-05-13 PROCEDURE — 93010 ELECTROCARDIOGRAM REPORT: CPT

## 2021-05-13 PROCEDURE — 99285 EMERGENCY DEPT VISIT HI MDM: CPT

## 2021-05-13 PROCEDURE — 85025 COMPLETE CBC W/AUTO DIFF WBC: CPT | Performed by: EMERGENCY MEDICINE

## 2021-05-13 RX ORDER — CYCLOBENZAPRINE HCL 10 MG
10 TABLET ORAL 3 TIMES DAILY PRN
Qty: 20 TABLET | Refills: 0 | Status: SHIPPED | OUTPATIENT
Start: 2021-05-13 | End: 2021-05-17

## 2021-05-13 RX ORDER — LIDOCAINE 50 MG/G
PATCH TOPICAL EVERY 24 HOURS
COMMUNITY
End: 2021-05-17

## 2021-05-13 RX ORDER — ASPIRIN 81 MG/1
324 TABLET, CHEWABLE ORAL ONCE
Status: DISCONTINUED | OUTPATIENT
Start: 2021-05-13 | End: 2021-05-13

## 2021-05-13 RX ORDER — NITROGLYCERIN 0.4 MG/1
0.4 TABLET SUBLINGUAL ONCE
Status: DISCONTINUED | OUTPATIENT
Start: 2021-05-13 | End: 2021-05-13

## 2021-05-13 NOTE — ED INITIAL ASSESSMENT (HPI)
Patient to ED c/o mid chest pain started about 15 minutes PTA.  + cardiac hx, this feels similar.   Denies SOB with pain, slight nausea

## 2021-05-13 NOTE — ED PROVIDER NOTES
Patient Seen in: [facility] Emergency Department      History   Patient presents with:  Chest Pain Angina    Stated Complaint: chest pain    HPI/Subjective:   HPI    77-year-old male presents for evaluation of chest pain.   Patient describes an achin bilateral hips, bone spurs left hip   • Other and unspecified hyperlipidemia    • Personal history of other musculoskeletal disorders(V13.59) 1970's    fractured both elbows   • Personal history of other musculoskeletal disorders(V13.59) 1969    rt foot Tortuous colon, incomplete. BE 8 mm polyp.      • COLONOSCOPY  4/17/17= Hemorrhoids, Tortuous    Repeat 2022, needs MAC   • HERNIA SURGERY  in 1983    Rt Inguinal   • HERNIA SURGERY  6-25-12 [facility]    Lap Repair Ventral Hernia w/mesh & Lysis of Adhesion BMI 33.64 kg/m²         Physical Exam    General: Alert, oriented, no apparent distress  HEENT: Atraumatic, normocephalic. Pupils equal reactive. Extraocular motions intact. Oropharynx clear. Neck: Supple  Lungs: Clear to auscultation bilaterally.   He CHEST AP PORTABLE  (CPT=71045)  TECHNIQUE:  AP chest radiograph was obtained.   COMPARISON:  [facility] , XR, XR CHEST AP PORTABLE  (CPT=71045), 9/22/2019, 12:22 PM.  INDICATIONS:  chest pain  PATIENT STATED HISTORY: (As transcribed by Technologist)  Patient st mg total) by mouth 3 (three) times daily as needed for Muscle spasms.   Qty: 20 tablet Refills: 0

## 2021-05-13 NOTE — ED QUICK NOTES
Patient reports receiving the [place] [employer] [employer] COVID vaccine yesterday.   + fatigue yesterday, chest pain started today

## 2021-05-17 PROBLEM — I48.0 PAROXYSMAL ATRIAL FIBRILLATION (HCC): Status: ACTIVE | Noted: 2021-05-17

## 2021-05-19 PROBLEM — M54.50 ACUTE LEFT-SIDED LOW BACK PAIN WITHOUT SCIATICA: Status: ACTIVE | Noted: 2021-05-19

## 2021-07-06 ENCOUNTER — OFFICE VISIT (OUTPATIENT)
Dept: INTERNAL MEDICINE CLINIC | Facility: CLINIC | Age: 61
End: 2021-07-06
Payer: COMMERCIAL

## 2021-07-06 VITALS
OXYGEN SATURATION: 98 % | HEIGHT: 73 IN | WEIGHT: 249 LBS | DIASTOLIC BLOOD PRESSURE: 70 MMHG | HEART RATE: 78 BPM | BODY MASS INDEX: 33 KG/M2 | SYSTOLIC BLOOD PRESSURE: 130 MMHG | TEMPERATURE: 98 F | RESPIRATION RATE: 16 BRPM

## 2021-07-06 DIAGNOSIS — I25.810 CORONARY ARTERY DISEASE INVOLVING CORONARY BYPASS GRAFT OF NATIVE HEART WITHOUT ANGINA PECTORIS: Primary | ICD-10-CM

## 2021-07-06 DIAGNOSIS — Z51.81 THERAPEUTIC DRUG MONITORING: ICD-10-CM

## 2021-07-06 DIAGNOSIS — E11.51 TYPE 2 DIABETES MELLITUS WITH PERIPHERAL VASCULAR DISEASE (HCC): ICD-10-CM

## 2021-07-06 DIAGNOSIS — E66.9 OBESITY (BMI 30-39.9): ICD-10-CM

## 2021-07-06 DIAGNOSIS — N18.31 STAGE 3A CHRONIC KIDNEY DISEASE (HCC): ICD-10-CM

## 2021-07-06 PROCEDURE — 3075F SYST BP GE 130 - 139MM HG: CPT | Performed by: INTERNAL MEDICINE

## 2021-07-06 PROCEDURE — 3008F BODY MASS INDEX DOCD: CPT | Performed by: INTERNAL MEDICINE

## 2021-07-06 PROCEDURE — 3078F DIAST BP <80 MM HG: CPT | Performed by: INTERNAL MEDICINE

## 2021-07-06 PROCEDURE — 99204 OFFICE O/P NEW MOD 45 MIN: CPT | Performed by: INTERNAL MEDICINE

## 2021-07-06 RX ORDER — AMITRIPTYLINE HYDROCHLORIDE 25 MG/1
25 TABLET, FILM COATED ORAL NIGHTLY
COMMUNITY
Start: 2021-06-25 | End: 2021-10-06

## 2021-07-06 NOTE — PATIENT INSTRUCTIONS
Plan:  Continue with medications:   Go to the lab for blood work   Follow up with me in 1 month  Schedule follow up appointments: Jannet Colindres (dietitian) or Lorene Belle (presurgery dietitian)   Check for insurance coverage for dietitian and labwork pr for sodium issues)   -hummus with vegetables  -bean dip with vegetables    FRUIT  Low carb fruit options   Raspberries: Half a cup (60 grams) contains 3 grams of carbs. Blackberries: Half a cup (70 grams) contains 4 grams of carbs.   Strawberries: Half a c

## 2021-07-06 NOTE — PROGRESS NOTES
HISTORY OF PRESENT ILLNESS  Patient presents with:  Weight Problem: was referred by Dr. Magdi Zelaya, no previous weight loss pills      Teofilo Sosa is a 64year old male new to our office today for initiation of medical weight loss program.  Patient pres YES   Diabetes: YES   Thyroid disease: negative   Constipation: negative  DVT: negative    Family or personal history of Pancreatic issues / Medullary Thyroid Cancer: negative    History of bariatric surgery: negative     [address] reviewed: obesity in parent/s GFR 35 (L) 08/30/2017    GFRNAA 33 (L) 05/13/2021    GFRAA 39 (L) 05/13/2021    CA 9.7 05/26/2021    OSMOCALC 295 05/13/2021    ALKPHO 54 05/13/2021    AST 32 05/13/2021    ALT 35 05/13/2021    BILT 0.3 05/13/2021    TP 7.5 05/13/2021    ALB 4.0 05/26/2021 90 mL, Rfl: 1  ergocalciferol 1.25 MG (51722 UT) Oral Cap, Take 1 capsule (50,000 Units total) by mouth once a week for 12 doses. , Disp: 12 capsule, Rfl: 0  HUMALOG KWIKPEN 100 UNIT/ML Subcutaneous Solution Pen-injector, TAKE 25 UNITS WITH BREAKFAST, 25 UN the lungs every 4 (four) hours as needed for Wheezing or Shortness of Breath., Disp: 1 Inhaler, Rfl: 3  Ferrous Sulfate 324 (65 Fe) MG Oral Tab EC, Take 324 mg/day by mouth every other day., Disp: , Rfl:   aspirin 81 MG Oral Tab, Take 81 mg by mouth daily. Soham Buchanan (dietitian) or Fredis Cervantes (presurgery dietitian)   Check for insurance coverage for dietitian and labwork prior to scheduling appointment. Please try to work on the following dietary changes:  1.  Drink lots of water and cut down on sod (60 grams) contains 3 grams of carbs. Blackberries: Half a cup (70 grams) contains 4 grams of carbs. Strawberries: Half a cup (100 grams) contains 6 grams of carbs. Blueberries: Half a cup (50 grams) contains 6 grams of carbs.   Plum: One medium-sized (8

## 2021-10-06 ENCOUNTER — OFFICE VISIT (OUTPATIENT)
Dept: NEUROLOGY | Facility: CLINIC | Age: 61
End: 2021-10-06
Payer: COMMERCIAL

## 2021-10-06 VITALS
DIASTOLIC BLOOD PRESSURE: 68 MMHG | SYSTOLIC BLOOD PRESSURE: 137 MMHG | HEART RATE: 83 BPM | BODY MASS INDEX: 34.33 KG/M2 | HEIGHT: 73 IN | RESPIRATION RATE: 16 BRPM | WEIGHT: 259 LBS

## 2021-10-06 DIAGNOSIS — G62.9 NEUROPATHY: ICD-10-CM

## 2021-10-06 DIAGNOSIS — E11.40 TYPE 2 DIABETES, UNCONTROLLED, WITH NEUROPATHY (HCC): Primary | ICD-10-CM

## 2021-10-06 DIAGNOSIS — E11.65 TYPE 2 DIABETES, UNCONTROLLED, WITH NEUROPATHY (HCC): Primary | ICD-10-CM

## 2021-10-06 DIAGNOSIS — R20.2 PARESTHESIAS: ICD-10-CM

## 2021-10-06 DIAGNOSIS — R20.2 NUMBNESS AND TINGLING IN LEFT HAND: ICD-10-CM

## 2021-10-06 DIAGNOSIS — R20.0 NUMBNESS AND TINGLING IN LEFT HAND: ICD-10-CM

## 2021-10-06 PROCEDURE — 99214 OFFICE O/P EST MOD 30 MIN: CPT | Performed by: OTHER

## 2021-10-06 PROCEDURE — 3078F DIAST BP <80 MM HG: CPT | Performed by: OTHER

## 2021-10-06 PROCEDURE — 3075F SYST BP GE 130 - 139MM HG: CPT | Performed by: OTHER

## 2021-10-06 PROCEDURE — 3008F BODY MASS INDEX DOCD: CPT | Performed by: OTHER

## 2021-10-06 RX ORDER — GABAPENTIN 300 MG/1
600 CAPSULE ORAL 2 TIMES DAILY
Qty: 360 CAPSULE | Refills: 0 | Status: SHIPPED | OUTPATIENT
Start: 2021-10-06

## 2021-10-06 NOTE — PROGRESS NOTES
[facility] Progress Note    HPI    Patient presents with:  Numbness:  Follow up      As per my initial note, 12/31/15:  Moe Barnes is a 64year old man, with past medical history significant for multiple medical problems, including states ~ 8/2021, [place] the mall walking around and tried to stand up after reaching down for an object and passed out and fell over. He had extensive workup and was told he had orthostatic hypotension.      He saw another neurologist 4/2021 and was rec hypogonadism 4/16/2013    Dx in 4/2013   • Psychiatric disorder    • Renal cancer ([facility] 12/27/2010    Right nephrectomy in 2010    • RENAL DISEASE    • Renal disorder     ckd stage 3   • S/p nephrectomy 12/27/2012    Right nephrectomy in 2010 for renal can 05/2019    LT   • LAPARO RADICAL NEPHRECTOMY Right 08/30/2010    Right nephrectomy   • LAPAROSCOPY, SURGICAL; NEPHRECTOMY     • OTHER SURGICAL HISTORY  At age 45s    Lipoma Excision, Neck   • OTHER SURGICAL HISTORY  4/24/17    Cystoscopy/ [facility]e Neil- Dr. Alba De La Fuente Years since quittin.7      Smokeless tobacco: Former User    Vaping Use      Vaping Use: Never used    Substance and Sexual Activity      Alcohol use: Not Currently        Comment: once per year      Drug use: No    Other Topics      Concerns: detemir (LEVEMIR FLEXTOUCH) 100 UNIT/ML Subcutaneous Solution Pen-injector, Inject up to 45 units twice daily.  DX CODE E11.65, Disp: 90 mL, Rfl: 1  •  Montelukast Sodium 10 MG Oral Tab, Take 1 tablet (10 mg total) by mouth nightly., Disp: 90 tablet, Rfl: 1 bilaterally  Extremities: no edema, peripheral pulses intact    Neck: supple, full range of motion; no carotid bruits    Neuro:  Mental status:  Orientation: Alert and oriented to person, place, time  Speech Fluent and conversational    CN: PERRL, EOMI wit CSF TUBE #       4   Color CSF      Colorless Colorless   Clarity CSF      Clear Clear   WBC CSF      0 - 5 /mm3 14 (H)   RBC CSF      /mm3 7,000   Neutrophils CSF      % 37   Lymphocytes CSF      % 45   Mono/Macrophages CSF      % 15   EOSINOPHILS CSF Absolute      1.00 - 4.00 x10(3) uL 2.99   Monocytes Absolute      0.10 - 1.00 x10(3) uL 0.48   Eosinophils Absolute      0.00 - 0.70 x10(3) uL 0.18   Basophils Absolute      0.00 - 0.20 x10(3) uL 0.03   Immature Granulocyte Absolute      0.00 - 1.00 x10(3 ASIALO-GM1 ANTIBODIES, IGG/IGM      0-50 IV 6   GM1 ANTIBODIES, IGG/IGM      0-50 IV 5   GM2 ANTIBODIES, IGG/IGM      0-50 IV 11   GD1A ANTIBODIES, IGG/IGM      0-50 IV 9   GD1B ANTIBODIES, IGG/IGM      0-50 IV 3   GQ1B ANTIBODIES, IGG/IGM      0-50 IV 4 uncovertebral   joint. CORD:  Normal caliber, contour and signal intensity. No abnormal enhancement.       =====  CONCLUSION:    1. No acute process.   2. Right-sided uncovertebral joint disc/osteophyte complex at C5-6 causing moderate right-sided axillar Broad-based disc bulge effacement of bilateral lateral recesses is noted. This abuts the L5 nerve roots. No significant spinal canal stenosis. Mild bilateral neural foraminal stenosis. L5-S1:  Minimal disc bulge is noted.   This effaces the right latera Wrist Dig V 2.76 3.39 4.8 4.6 Wrist - Dig V 12   43      B. Elbow Dig V 4.32 5.05 13.4 12.1 B. Elbow - Wrist   1.67     R Radial - Anatomical snuff box (Forearm)      Forearm Wrist 1.98 2.55 11.2 26.0 Forearm - Wrist 10   51   L Radial - Anatomical snuff box Axillary C5-C6 N None None None None N N N N   R. Biceps brachii Musculocutaneous C5-C6 N None None None None N N N N   R. Triceps brachii Radial C6-C8 N None None None None N N N N   R.  Extensor digitorum communis Radial C7-C8 N None None None None N N N prolonged.    10. Left ulnar motor response was abnormal due to slowed conduction velocity with normal amplitude and normal distal motor latency; F wave was prolonged.     EMG (needle exam):   Concentric needle EMG was performed on the selected muscles list Right radial sensory response was borderline. 5. Right tibial motor response was absent  6. Right common peroneal motor response was abnormal due to   severely reduced amplitude, with slowed conduction velocity and   normal distal motor latency.   7. Right hyperlipidemia, type 2 diabetes (poorly controlled), who initially presented to the ED 11/23/15 for evaluation of acute onset of numbness in the left hand and left face, who now presents in follow up.      Patient previously was noted to have left face, magana symptoms in left hand - will plan for NCS/EMG left upper extremity to evaluate further.     (E11.40,  E11.65) Type 2 diabetes, uncontrolled, with neuropathy ([facility]  (primary encounter diagnosis)  Plan: EMG ([address] [place] [address])        As

## 2021-10-07 ENCOUNTER — PROCEDURE VISIT (OUTPATIENT)
Dept: NEUROLOGY | Facility: CLINIC | Age: 61
End: 2021-10-07
Payer: COMMERCIAL

## 2021-10-07 DIAGNOSIS — G62.9 NEUROPATHY: Primary | ICD-10-CM

## 2021-10-07 DIAGNOSIS — R20.0 NUMBNESS AND TINGLING IN LEFT HAND: ICD-10-CM

## 2021-10-07 DIAGNOSIS — R20.2 NUMBNESS AND TINGLING IN LEFT HAND: ICD-10-CM

## 2021-10-07 PROCEDURE — 95909 NRV CNDJ TST 5-6 STUDIES: CPT | Performed by: OTHER

## 2021-10-07 PROCEDURE — 95886 MUSC TEST DONE W/N TEST COMP: CPT | Performed by: OTHER

## 2021-10-07 NOTE — PROCEDURES
[facility]  [address] [place], [address] [facility]  Ph: [phone]  FAX: [phone]        Full Name: Elizabeth Piper Gender: Male  Patient ID: [id number] YOB: 1960      Visit Date: 10/7/2021 11:39  Age Ulnar Palm  0.05        Motor NCS      Nerve / Sites Muscle Latency Amplitude Amp % Duration Area Segments Distance Lat Diff Velocity     ms mV % ms mVms  cm ms m/s   L Median - APB      Wrist APB 4.22 9.4 100 6.09 31.2 Wrist - APB 7        Elbow APB 10.73 conduction velocity, with normal distal motor latency, normal amplitude distally. On proximal stimulation, there was a drop in amplitude of approximately 30%; F wave response was absent.       EMG (needle exam):  Concentric needle EMG was performed on delores

## 2021-10-17 ENCOUNTER — PATIENT MESSAGE (OUTPATIENT)
Dept: FAMILY MEDICINE CLINIC | Facility: CLINIC | Age: 61
End: 2021-10-17

## 2021-10-18 NOTE — TELEPHONE ENCOUNTER
From: Moe Barnes  To: Shannon Delacruz MD  Sent: 10/17/2021 11:21 AM CDT  Subject: Shingles Shot and other health issues    I need to get the Shingles Shot on our visit. Also need to talk about health issues on our first visit.      Katelyn Alvares

## 2022-01-12 PROBLEM — I25.118 CORONARY ARTERY DISEASE OF NATIVE ARTERY OF NATIVE HEART WITH STABLE ANGINA PECTORIS (HCC): Status: ACTIVE | Noted: 2017-08-08

## 2022-02-13 PROBLEM — I25.10 ATHEROSCLEROSIS OF CORONARY ARTERY: Status: ACTIVE | Noted: 2017-08-08

## 2022-02-13 PROBLEM — R55 SYNCOPE: Status: ACTIVE | Noted: 2021-08-14

## 2022-02-25 ENCOUNTER — OFFICE VISIT (OUTPATIENT)
Dept: NEUROLOGY | Facility: CLINIC | Age: 62
End: 2022-02-25
Payer: COMMERCIAL

## 2022-02-25 VITALS
WEIGHT: 249 LBS | RESPIRATION RATE: 16 BRPM | DIASTOLIC BLOOD PRESSURE: 58 MMHG | HEART RATE: 90 BPM | HEIGHT: 73 IN | SYSTOLIC BLOOD PRESSURE: 130 MMHG | BODY MASS INDEX: 33 KG/M2

## 2022-02-25 DIAGNOSIS — R20.2 NUMBNESS AND TINGLING IN LEFT HAND: ICD-10-CM

## 2022-02-25 DIAGNOSIS — IMO0002 TYPE 2 DIABETES, UNCONTROLLED, WITH NEUROPATHY: Primary | ICD-10-CM

## 2022-02-25 DIAGNOSIS — R55 SYNCOPE AND COLLAPSE: ICD-10-CM

## 2022-02-25 DIAGNOSIS — R68.89 SPELLS OF DECREASED ATTENTIVENESS: ICD-10-CM

## 2022-02-25 DIAGNOSIS — R20.0 NUMBNESS AND TINGLING IN LEFT HAND: ICD-10-CM

## 2022-02-25 DIAGNOSIS — G62.9 NEUROPATHY: ICD-10-CM

## 2022-02-25 PROCEDURE — 99213 OFFICE O/P EST LOW 20 MIN: CPT | Performed by: OTHER

## 2022-02-25 PROCEDURE — 3075F SYST BP GE 130 - 139MM HG: CPT | Performed by: OTHER

## 2022-02-25 PROCEDURE — 3008F BODY MASS INDEX DOCD: CPT | Performed by: OTHER

## 2022-02-25 PROCEDURE — 3078F DIAST BP <80 MM HG: CPT | Performed by: OTHER

## 2022-03-18 ENCOUNTER — TELEPHONE (OUTPATIENT)
Dept: NEUROLOGY | Facility: CLINIC | Age: 62
End: 2022-03-18

## 2022-04-04 ENCOUNTER — NURSE ONLY (OUTPATIENT)
Dept: ELECTROPHYSIOLOGY | Facility: HOSPITAL | Age: 62
End: 2022-04-04
Attending: Other
Payer: COMMERCIAL

## 2022-04-04 DIAGNOSIS — R68.89 SPELLS OF DECREASED ATTENTIVENESS: ICD-10-CM

## 2022-04-04 DIAGNOSIS — R55 SYNCOPE AND COLLAPSE: ICD-10-CM

## 2022-04-04 PROCEDURE — 95816 EEG AWAKE AND DROWSY: CPT

## 2022-04-06 ENCOUNTER — OFFICE VISIT (OUTPATIENT)
Dept: NEUROLOGY | Facility: CLINIC | Age: 62
End: 2022-04-06
Payer: COMMERCIAL

## 2022-04-06 VITALS
HEIGHT: 73 IN | SYSTOLIC BLOOD PRESSURE: 126 MMHG | WEIGHT: 250 LBS | HEART RATE: 86 BPM | DIASTOLIC BLOOD PRESSURE: 70 MMHG | RESPIRATION RATE: 16 BRPM | BODY MASS INDEX: 33.13 KG/M2

## 2022-04-06 DIAGNOSIS — E11.42 TYPE 2 DIABETES MELLITUS WITH PERIPHERAL NEUROPATHY (HCC): Primary | ICD-10-CM

## 2022-04-06 DIAGNOSIS — R55 SYNCOPE AND COLLAPSE: ICD-10-CM

## 2022-04-06 DIAGNOSIS — R68.89 SPELLS OF DECREASED ATTENTIVENESS: ICD-10-CM

## 2022-04-06 DIAGNOSIS — G62.9 NEUROPATHY: ICD-10-CM

## 2022-04-06 PROCEDURE — 3008F BODY MASS INDEX DOCD: CPT | Performed by: OTHER

## 2022-04-06 PROCEDURE — 3078F DIAST BP <80 MM HG: CPT | Performed by: OTHER

## 2022-04-06 PROCEDURE — 3074F SYST BP LT 130 MM HG: CPT | Performed by: OTHER

## 2022-04-06 PROCEDURE — 99213 OFFICE O/P EST LOW 20 MIN: CPT | Performed by: OTHER

## 2022-04-06 NOTE — PROCEDURES
[address] [facility] in [place]  in affiliation with [facility]  [address]  [address]  [phone]  Fax [phone]      Name: Sariah Marquez  6/21/1960  Date of Study: 4/4/2022    ELECTROENCEPHALOGRAPHY     Ordering Physician: Gris Solis MD                               Primary Care Physician: Arabella Marques MD    Technical Aspects:   1. 10-20 International System   2. Referential and Bipolar and monopolar recording montage   3. Routine recording (awake and asleep)   4. Portable -C. E.S.   5. Impedance - 10 kilohms or less     Clinical History    Sariah Marquez had no medications administered during this visit. DX: SYNCOPE AND COLLAPSE  HX: 63 Y/O MALE WITH COMPLAINTS OF 3 EPISODES OF SYNCOPE, ONE WITH URINARY INCONTINENCE.  PMH: DEEPA, ANEMIA, AFIB, ASTHMA, CANCER, DEPRESSION, DM2, GERD, MI, HBP, HIGH CHOLESTEROL, SLEEP APNEA  RX: ROSUVASTATIN, AMITRIPTYLINE, INSULIN, GABAPENTIN, METOPROLOL, LOSARTAN, MONTELUKAST, ALBUTEROL  PT STATE: A&OX3  AWAKE AND DROWSY   NO HV OR PS PERFORMED    Interpretation    Background activity: Posterior dominant background rhythm consisted of 9-10 Hz, 15-30 uV alpha rhythm, which was reactive to eye closure and eye opening    Slowing: None     Sleep: Sleep stage 1 and 2 were noted, characterized by the appearance of vertex waves and sleep spindles, respectively    Photic stimulation and hyperventilation were not performed. Epileptiform activity: None    Seizures: none    Events: none    Impression: This EEG is normal.  No epileptiform activity, abnormal slowing or clinical or electrographic seizures were noted on this awake and asleep recording.      Gris Solis MD, Neurology  [facility]  Pager [phone]

## 2022-05-03 RX ORDER — GABAPENTIN 300 MG/1
300 CAPSULE ORAL 3 TIMES DAILY
Qty: 270 CAPSULE | Refills: 1 | Status: SHIPPED | OUTPATIENT
Start: 2022-05-03

## 2022-06-25 NOTE — TELEPHONE ENCOUNTER
Called patient; discussed EMG results    Will plan for LP to evaluate for possible primary demyelinating neuropathy but informed this may all be related to poorly controlled diabetes  Symptoms (Pediatric)

## 2022-10-31 ENCOUNTER — OFFICE VISIT (OUTPATIENT)
Dept: NEUROLOGY | Facility: CLINIC | Age: 62
End: 2022-10-31
Payer: COMMERCIAL

## 2022-10-31 VITALS
HEART RATE: 78 BPM | SYSTOLIC BLOOD PRESSURE: 130 MMHG | DIASTOLIC BLOOD PRESSURE: 66 MMHG | RESPIRATION RATE: 16 BRPM | BODY MASS INDEX: 33.13 KG/M2 | HEIGHT: 73 IN | WEIGHT: 250 LBS

## 2022-10-31 DIAGNOSIS — G62.9 NEUROPATHY: ICD-10-CM

## 2022-10-31 DIAGNOSIS — E11.42 TYPE 2 DIABETES MELLITUS WITH PERIPHERAL NEUROPATHY (HCC): Primary | ICD-10-CM

## 2022-10-31 PROCEDURE — 3008F BODY MASS INDEX DOCD: CPT | Performed by: OTHER

## 2022-10-31 PROCEDURE — 3075F SYST BP GE 130 - 139MM HG: CPT | Performed by: OTHER

## 2022-10-31 PROCEDURE — 3078F DIAST BP <80 MM HG: CPT | Performed by: OTHER

## 2022-10-31 PROCEDURE — 99213 OFFICE O/P EST LOW 20 MIN: CPT | Performed by: OTHER

## 2022-10-31 RX ORDER — GABAPENTIN 300 MG/1
300 CAPSULE ORAL 2 TIMES DAILY
Qty: 180 CAPSULE | Refills: 1 | Status: SHIPPED | OUTPATIENT
Start: 2022-10-31

## 2023-01-02 DIAGNOSIS — E11.42 TYPE 2 DIABETES MELLITUS WITH PERIPHERAL NEUROPATHY (HCC): ICD-10-CM

## 2023-01-02 DIAGNOSIS — G62.9 NEUROPATHY: ICD-10-CM

## 2023-01-02 RX ORDER — GABAPENTIN 300 MG/1
300 CAPSULE ORAL 2 TIMES DAILY
Qty: 180 CAPSULE | Refills: 1 | Status: CANCELLED | OUTPATIENT
Start: 2023-01-02

## 2023-01-26 DIAGNOSIS — E11.42 TYPE 2 DIABETES MELLITUS WITH PERIPHERAL NEUROPATHY (HCC): ICD-10-CM

## 2023-01-26 DIAGNOSIS — G62.9 NEUROPATHY: ICD-10-CM

## 2023-01-26 RX ORDER — GABAPENTIN 300 MG/1
300 CAPSULE ORAL 2 TIMES DAILY
Qty: 180 CAPSULE | Refills: 1 | Status: SHIPPED | OUTPATIENT
Start: 2023-01-26

## 2023-03-08 DIAGNOSIS — E11.42 TYPE 2 DIABETES MELLITUS WITH PERIPHERAL NEUROPATHY (HCC): ICD-10-CM

## 2023-03-08 DIAGNOSIS — G62.9 NEUROPATHY: ICD-10-CM

## 2023-03-08 RX ORDER — GABAPENTIN 300 MG/1
300 CAPSULE ORAL 2 TIMES DAILY
Qty: 180 CAPSULE | Refills: 1 | Status: CANCELLED | OUTPATIENT
Start: 2023-03-08

## 2023-03-09 RX ORDER — GABAPENTIN 300 MG/1
300 CAPSULE ORAL 2 TIMES DAILY
Qty: 180 CAPSULE | Refills: 1 | Status: SHIPPED | OUTPATIENT
Start: 2023-03-09

## 2023-04-19 ENCOUNTER — OFFICE VISIT (OUTPATIENT)
Dept: NEUROLOGY | Facility: CLINIC | Age: 63
End: 2023-04-19
Payer: MEDICARE

## 2023-04-19 VITALS
SYSTOLIC BLOOD PRESSURE: 120 MMHG | RESPIRATION RATE: 16 BRPM | BODY MASS INDEX: 34 KG/M2 | HEART RATE: 76 BPM | WEIGHT: 260 LBS | DIASTOLIC BLOOD PRESSURE: 64 MMHG

## 2023-04-19 DIAGNOSIS — G62.9 NEUROPATHY: Primary | ICD-10-CM

## 2023-04-19 DIAGNOSIS — E11.42 TYPE 2 DIABETES MELLITUS WITH PERIPHERAL NEUROPATHY (HCC): ICD-10-CM

## 2023-04-19 PROCEDURE — 3074F SYST BP LT 130 MM HG: CPT | Performed by: OTHER

## 2023-04-19 PROCEDURE — 3078F DIAST BP <80 MM HG: CPT | Performed by: OTHER

## 2023-04-19 PROCEDURE — 99213 OFFICE O/P EST LOW 20 MIN: CPT | Performed by: OTHER

## 2023-04-19 RX ORDER — GLUCAGON INJECTION, SOLUTION 1 MG/.2ML
1 INJECTION, SOLUTION SUBCUTANEOUS AS NEEDED
COMMUNITY
Start: 2022-12-30

## 2023-04-19 RX ORDER — EMPAGLIFLOZIN 10 MG/1
10 TABLET, FILM COATED ORAL DAILY
COMMUNITY
Start: 2023-03-23

## 2023-04-19 NOTE — PROGRESS NOTES
Patient here for follow up for polyneuropathy. Patient reports it has improved. He no longer feels the pins and needles, and his HgA1C is down to 6.8. He has been trying to lose weight with working out 3x/week, but has not lost anything yet.

## 2023-08-13 ENCOUNTER — PATIENT MESSAGE (OUTPATIENT)
Dept: NEUROLOGY | Facility: CLINIC | Age: 63
End: 2023-08-13

## 2023-08-14 NOTE — TELEPHONE ENCOUNTER
From: Ashly Collins  To: Abelardo Brown MD  Sent: 8/13/2023 3:33 PM CDT  Subject: Appointment     I am now seeing a Neurologist from [facility] in [place], [place].  Please cancel all future appointments with Dr. Abelardo Brown

## 2023-11-28 ENCOUNTER — HOSPITAL ENCOUNTER (OUTPATIENT)
Age: 63
Discharge: HOME | End: 2023-11-28
Attending: EMERGENCY MEDICINE
Payer: MEDICARE

## 2023-11-28 VITALS
SYSTOLIC BLOOD PRESSURE: 172 MMHG | OXYGEN SATURATION: 97 % | TEMPERATURE: 98 F | BODY MASS INDEX: 33 KG/M2 | WEIGHT: 250 LBS | RESPIRATION RATE: 18 BRPM | DIASTOLIC BLOOD PRESSURE: 82 MMHG | HEART RATE: 89 BPM

## 2023-11-28 DIAGNOSIS — Z79.4 TYPE 2 DIABETES MELLITUS WITH HYPERGLYCEMIA, WITH LONG-TERM CURRENT USE OF INSULIN (HCC): Primary | ICD-10-CM

## 2023-11-28 DIAGNOSIS — E11.65 TYPE 2 DIABETES MELLITUS WITH HYPERGLYCEMIA, WITH LONG-TERM CURRENT USE OF INSULIN (HCC): Primary | ICD-10-CM

## 2023-11-28 LAB — GLUCOSE BLD-MCNC: 315 MG/DL (ref 70–99)

## 2023-11-28 PROCEDURE — 99212 OFFICE O/P EST SF 10 MIN: CPT

## 2023-11-28 PROCEDURE — 82962 GLUCOSE BLOOD TEST: CPT

## 2023-11-28 PROCEDURE — 99214 OFFICE O/P EST MOD 30 MIN: CPT

## 2023-11-28 NOTE — ED INITIAL ASSESSMENT (HPI)
Pt had cgm readings 30's-50's today - pt unable to verify by finger stick but did not have any symptoms of hypoglycemia; pt states he took glucagon at 0700 and 1400 based on cgm readings; cgm  pt put new cgm on and now reading in 300s.     Pt fatigued since yesterday, vomited x 1 today    No fever/cough

## (undated) DIAGNOSIS — G62.9 NEUROPATHY: ICD-10-CM

## (undated) DEVICE — FILTERLINE NASAL ADULT O2/CO2

## (undated) DEVICE — Device: Brand: DEFENDO AIR/WATER/SUCTION AND BIOPSY VALVE

## (undated) DEVICE — 1200CC GUARDIAN II: Brand: GUARDIAN

## (undated) DEVICE — ENDOSCOPY PACK UPPER: Brand: MEDLINE INDUSTRIES, INC.

## (undated) DEVICE — 3M™ RED DOT™ MONITORING ELECTRODE WITH FOAM TAPE AND STICKY GEL, 50/BAG, 20/CASE, 72/PLT 2570: Brand: RED DOT™

## (undated) DEVICE — FORCEP BIOPSY RJ4 LG CAP W/ND

## (undated) NOTE — MR AVS SNAPSHOT
[facility]  [address], [address] [facility] [address] [phone]               Thank you for choosing us for your health care visit with Rafal Matias MD.  We are glad to serve you and happy to provide you with this kennedy Increase dose of gabapentin up to 600 mg tid - start with 600 mg AM / 300 mg PM / 300 mg nightly x1 week, then increase to 600 mg / 600 mg / 300 mg x1 week, then 600 mg three times daily   Follow up in 3 months    After your visit at the [facility] If your physician has ordered radiology tests such as MRI or CT scans, do not schedule the test until this office has notified you that the test has been approved by your insurer. Depending on your insurance carrier, approval may take 3-10 days.  It is hig Take 81 mg by mouth 2 (two) times daily. What changed:  Another medication with the same name was removed. Continue taking this medication, and follow the directions you see here.            Atorvastatin Calcium 80 MG Tabs   Take 1 tablet by mouth nightly Metoprolol Succinate ER 50 MG Tb24   Take 50 mg by mouth daily. Commonly known as: Toprol XL           Omega-3-acid Ethyl Esters 1 g Caps   Take 2 g by mouth 2 (two) times daily.    Commonly known as:  LOVAZA           ONETOUCH VERIO Strp   Generic drug

## (undated) NOTE — LETTER
To Whom it May concern,    Kaleb Guerin was hospitalized at [facility].  Raul Nichole to return to work on 3/20/17    Sincerely      Stephanie Persaud MD

## (undated) NOTE — LETTER
10/22/18          Mary Beth Yanez  :  1960      To Whom It May Concern:    Please excuse patient from work for 10/22 - 10/23. Work status:   May return to work full-time with restrictions as previously noted, working ~ 6 hrs per day, as of 10/24

## (undated) NOTE — LETTER
[facility] [address], [address]  Consent for Procedure/Sedation    Date:     Time:       1.  I authorize the performance upon Marciano Urrutia the following:cardiac catheterization, left ventricular cineangiography, bilateral delores Signature of person authorized                                     Relationship to  to consent for patient: _________________________ patient: ___________________    Witness: _______________________________ Date: _____________________    Printed: 11/12/202

## (undated) NOTE — LETTER
[facility] [place]  [address] [address]  Dept: [phone]  Dept Fax: [phone]         April 5, 2017    Patient: Johann Casillas   YOB: 1960   Date of Visit: 4/5/2017       To Whom It May Concern:

## (undated) NOTE — ED AVS SNAPSHOT
[facility] in [address] [place] [address] Drive,4Th Floor    [facility]    Phone:  [phone]    Fax:  [facility]   MRN: [id number]    Department:  [facility]   Date of Visit:  4/18/2017 SP PULMONARY MEDICINE (Gisella at [facility])    [address] [facility]  [address]   [phone]              Medications Administered During Visit     Admin Date Administration Dose                   04/18/2017  21:26 ipratropium worsening symptoms please go to the ER immediately.      Discharge References/Attachments     BRONCHITIS WITH WHEEZING (ADULT) (ENGLISH)      Disclosure     Insurance plans vary and the physician(s) referred by the Immediate Care may not be covered by your IF THERE IS ANY CHANGE OR WORSENING OF YOUR CONDITION, CALL YOUR PRIMARY CARE PHYSICIAN AT ONCE OR GO TO THE EMERGENCY DEPARTMENT.     If you have been prescribed any medication(s), please fill your prescription right away and begin taking the medication(s) Patient [address] to help you get signed up for insurance coverage. Patient [address] is a Federal Navigator program that can help with your Affordable Care Act coverage, as well as all types of Medicaid plans.   To get signed up and covere Call [phone] for help. [employer] is NOT to be used for urgent needs. For medical emergencies, dial 911.

## (undated) NOTE — LETTER
Date & Time: 5/5/2019, 7:49 PM  Patient: Mary Beth Yanez  Encounter Provider(s):    Charlotte Adam MD       To Whom It May Concern:    Annie Cody was seen and treated in our department on 5/5/2019. He may return to work on 5/7/2019.     If you have any q

## (undated) NOTE — LETTER
19          Henrique Acosta  :  1960      To Whom It May Concern: This patient was seen in our office on 19 . Please excuse his absence to attend to his medical appointment.     If this office may be of further assistance, please do

## (undated) NOTE — LETTER
[facility] [address], [address]  Consent for Procedure/Sedation    Date: 10/1/2020    Time: 2000 1.  I authorize the performance upon Negin Davis the following:cardiac catheterization, left ventricular cineangiography, b Signature of Patient: ____________________________________________________    Signature of person authorized                                     Relationship to  to consent for patient: _________________________ patient: ___________________    Witness: ___

## (undated) NOTE — LETTER
[facility] [address], [address]  Consent for Procedure/Sedation    Date:     Time:       1.  I authorize the performance upon Sindhu Jessy the following:cardiac catheterization, left ventricular cineangiography, bilateral delores period, the physician will determine when the applicable recovery period ends for purposes of reinstating the Do Not Resuscitate (DNR) order.     Signature of Patient: ____________________________________________________    Signature of person authorized

## (undated) NOTE — LETTER
[facility] [address], [address]  Consent for Procedure/Sedation    Date:       Time:       1.  I authorize the performance upon Zondra Check the following:cardiac catheterization, left ventricular cineangiography, bilateral se period, the physician will determine when the applicable recovery period ends for purposes of reinstating the Do Not Resuscitate (DNR) order.     Signature of Patient: ____________________________________________________    Signature of person authorized

## (undated) NOTE — LETTER
Patient Name: Joshua Swift  YOB: 1960          MRN number:  [id number]  Date:  12/5/2018  Referring Physician:  Shital Mcclure     Progress Summary    Pt has attended 8, cancelled 0, and no shown 0 visits in Physical Therapy.    Subjective: Hamstrings: R 45 deg; L 45 deg (SLR, stretching in HS--> no reproduction of leg pain)  Piriformis: R WNL; L WNL       Goals:   1.  The patient will report being able to ambulate for 30 minutes at a time while maintaining a pain level <4/10 within 8 visits S

## (undated) NOTE — ED AVS SNAPSHOT
[facility] Emergency Department    [place] [address]    Phone:  [phone]    Fax:  Isis Hummel [address]   MRN: [id number]    Department:  [facility] Emergency Department   Date of Visit:  4/25 IF THERE IS ANY CHANGE OR WORSENING OF YOUR CONDITION, CALL YOUR PRIMARY CARE PHYSICIAN AT ONCE OR RETURN IMMEDIATELY TO THE EMERGENCY DEPARTMENT.     If you have been prescribed any medication(s), please fill your prescription right away and begin taking t

## (undated) NOTE — ED AVS SNAPSHOT
[facility] Emergency Department    [place] [address]    Phone:  [phone]    Fax:  Isis Gresham [address]   MRN: [id number]    Department:  [facility] Emergency Department   Date of Visit:  4/25 Admin Date Administration Dose                   04/25/2017  12:44 ipratropium-albuterol (DUONEB) nebulizer solution 3 mL 3 mL                     Medication Information       Follow the directions for taking your medications provided by your doctor.  Zoraida If you have any problems with your follow-up, please call our  at [phone]    Si usted tiene algun problema con kennedy sequimiento, por favor llame a nuestro adminstrador de casos al [phone]    Expect to receive an electronic request Sharron Jacobs [address] ([address]) Jud ([address] ([address]) [address]  (Do INDICATIONS:  antonio     COMPARISON:  CHUCKIE, XR CHEST PA + LAT CHEST (CPT=71020), 4/18/2017, 21:11.     TECHNIQUE:  PA and lateral chest radiographs were obtained.      PATIENT STATED HISTORY: (As transcribed by Technologist)  Patient complains of shortn

## (undated) NOTE — ED AVS SNAPSHOT
[facility] in [address] [place] [address] Drive,4Th Floor    600 [facility]    Phone:  [phone]    Fax:  [address] [place] [address]   MRN: [id number]    Department:  [address] [facility] in [facility]   Date of Visit:  4/5/2017 Admin Date Administration Dose                   04/05/2017  12:04 ipratropium-albuterol (DUONEB) nebulizer solution 3 mL 3 mL                     Medication Information       Follow the directions for taking your medications provided by your doctor.  Zoraida benadryl 25 mg at night time  · Avoid pseudoephedrine or phenylephrine if you have heart problems or blood pressure issues like hypertension. It will raise your blood pressure. · Pneumonia is a risk with a cold.  If your symptoms worsen, you become short- receive this, we would really appreciate it if you could take the time to complete it. Thank you! You were examined and treated today on an urgent basis only. This was not a substitute for ongoing medical care.  Often, one Immediate Care visit does no 4[address]  [facility] ([address]) [facility] [address] ([address]) [address]  [place] ([address]) 21  [address] [facility] [address] ([address]) 842 consolidation. Normal vascularity. CARDIAC:  Normal size cardiac silhouette. MEDIASTINUM:  Normal.  PLEURA:  Normal.  No pleural effusions. BONES:  Normal for age. Sternal wires and mediastinal signs of prior cardiac surgery.                MyChart

## (undated) NOTE — ED AVS SNAPSHOT
Jordyn Randall   MRN: [id number]    Department:  [facility] Emergency Department   Date of Visit:  10/9/2019           Disclosure     Insurance plans vary and the physician(s) referred by the ER may not be covered by your plan.  Please contact you tell this physician (or your personal doctor if your instructions are to return to your personal doctor) about any new or lasting problems. The primary care or specialist physician will see patients referred from the [facility] Emergency Department.  Susannah Clinton

## (undated) NOTE — LETTER
[facility] IN [place]  [address] [address]  Dept: [phone]  Dept Fax: [phone]      April 18, 2017    Patient: Gigi Castañeda   Date of Visit: 4/18/2017       To Whom It May Concern:    Gold Reece was seen and tr

## (undated) NOTE — ED AVS SNAPSHOT
Jordyn Randall   MRN: [id number]    Department:  [facility] Emergency Department   Date of Visit:  5/5/2019           Disclosure     Insurance plans vary and the physician(s) referred by the ER may not be covered by your plan.  Please contact your tell this physician (or your personal doctor if your instructions are to return to your personal doctor) about any new or lasting problems. The primary care or specialist physician will see patients referred from the [facility] Emergency Department.  Dyan Arboleda

## (undated) NOTE — IP AVS SNAPSHOT
[address]            (For Outpatient Use Only) Initial Admit Date: 5/29/2018   Inpt/Obs Admit Date: Inpt: N/A / Obs: 05/29/18   Discharge Date:    Hospital Acct:  [de-identified]   MRN: [de-identified]   CSN: [id number]        [facility] Hospital Account Financial Class: Managed Care    May 30, 2018

## (undated) NOTE — IP AVS SNAPSHOT
[facility] [place] [address] [place], [address] ~ [phone]                Discharge Summary   3/15/2017    [facility]           Admission Information        Provider Department    3/15/2017 Shelley Barkley MD  4nw-A         T TAKE 1 CAPSULE (130 MG TOTAL) BY MOUTH EVERY MORNING BEFORE BREAKFAST.     Nanette Castillo taking these medications        Instructions Authorizing Provider    Morning Afternoon Evening As Needed    Amitriptyline HCl 25 Take one tablet daily for one week, then daily as needed    Scooby Nunez                           insulin aspart 100 UNIT/ML Sopn   Last time this was given:  1 Units on 3/17/2017  8:32 AM   Commonly known as:  NOVOLOG FLEXPEN        Inject 25 Units into Follow up in 1-2 weeks with Dr. Alexandro Bolton PA-C  Department of Urology  [facility]  3/17/17 1040    Please check blood work (BMP) at the follow up with Dr. Dean Elliott MD     Please follow up with Cardiology and have stress test done as Hospital/SNF F/U with MD Giulia Begum [facility])    [address] [facility] [address] [address] [facility] [address]   [phone]            Apr 04, 2017  1:00 PM   Exam - Established Patient with MD Pablo Ramires 55.8 (03/16/17)  35.3 (03/16/17)  6.0 (03/16/17)  2.0 (03/16/17)  0.4 -- (03/16/17)  4.44 (03/16/17)  2.81 (03/16/17)  0.48 (03/16/17)  0.16 (03/16/17)  0.03    (03/15/17)  48.3 (03/15/17)  40.6 (03/15/17)  7.3 (03/15/17)  2.9 (03/15/17)  0.4  (03/15/17) discharge instructions in [employer] by going to Visits < Admission Summaries. If you've been to the Emergency Department or your doctor's office, you can view your past visit information in [employer] by going to Visits < Visit Summaries. [employer] questions? Omega-3-acid Ethyl Esters 1 G Oral Cap       Use: Lower cholesterol, protect your heart   Most common side effects: Dizziness, constipation, abnormal liver function   What to report to your healthcare team:  Dizziness, muscle aches, constipation What to report to your healthcare team: Pain, nausea/vomiting, no bowel movement in 2+ days, diarrhea           Respiratory Medications     Fluticasone Propionate 50 MCG/ACT Nasal Suspension       Use:  Treatment of asthma, COPD/emphysema, cough, allergies

## (undated) NOTE — LETTER
[facility] [address], [address]  Consent for Procedure/Sedation    Date:     Time:       1.  I authorize the performance upon Philip Blocker the following:cardiac catheterization, left ventricular cineangiography, bilateral delores period, the physician will determine when the applicable recovery period ends for purposes of reinstating the Do Not Resuscitate (DNR) order.     Signature of Patient: ____________________________________________________    Signature of person authorized

## (undated) NOTE — ED AVS SNAPSHOT
Mishel Mark   MRN: [id number]    Department:  [facility] Emergency Department   Date of Visit:  11/6/2018           Disclosure     Insurance plans vary and the physician(s) referred by the ER may not be covered by your plan.  Please contact you tell this physician (or your personal doctor if your instructions are to return to your personal doctor) about any new or lasting problems. The primary care or specialist physician will see patients referred from the [facility] Emergency Department.  Arthor Bernheim

## (undated) NOTE — MR AVS SNAPSHOT
[address], [address] [phone]               Thank you for choosing us for your health care visit with Leslee Conroy MD.  We are glad to serve you and happy to provide you with [facility] Return in about 6 months (around 10/4/2017).          Reason for Today's Visit     Chronic Kidney Disease     Hypertension           Medical Issues Discussed Today     Microalbuminuria    Hypertension, benign          Instructions and Information about You dispense 5 boxes for 3 month supply   Commonly known as:  NOVOLOG FLEXPEN           insulin detemir 100 UNIT/ML Sopn   Inject 25-30 Units into the skin every 12 (twelve) hours.  Please dispense 4 boxes for 3 month supply   Commonly known as:  LEVEMIR FLEXTO

## (undated) NOTE — LETTER
Patient Name: Laly Salazar  YOB: 1960          MRN number:  [id number]  Date:  12/21/2018  Referring Physician:  Ian Garcia     Discharge Summary    Pt has attended 11, cancelled 0, and no shown 0 visits in Physical Therapy.    Subjecti while maintaining a pain level <4/10 within 8 visits MET, the patient walked 1.5 miles while maintaining a low pain level.   2. The patient will demonstrate the ability to squat down an  a light object from the floor with proper body mechanics within

## (undated) NOTE — LETTER
April 25, 2017    Patient: Diane Verde   Date of Visit: 4/25/2017       To Whom It May Concern:    Anayeli Cason was seen and treated in our emergency department on 4/25/2017. He should not return to work until thursday April 27. .    If you have an